# Patient Record
Sex: MALE | Race: WHITE | Employment: OTHER | URBAN - METROPOLITAN AREA
[De-identification: names, ages, dates, MRNs, and addresses within clinical notes are randomized per-mention and may not be internally consistent; named-entity substitution may affect disease eponyms.]

---

## 2017-02-13 PROBLEM — R06.83 SNORING: Status: ACTIVE | Noted: 2017-02-13

## 2017-02-13 PROBLEM — G47.10 HYPERSOMNOLENCE: Status: ACTIVE | Noted: 2017-02-13

## 2017-02-22 ENCOUNTER — HOSPITAL ENCOUNTER (OUTPATIENT)
Dept: LAB | Age: 72
Discharge: HOME | End: 2017-02-22

## 2017-02-22 PROCEDURE — 88312 SPECIAL STAINS GROUP 1: CPT | Performed by: INTERNAL MEDICINE

## 2017-02-22 PROCEDURE — 88305 TISSUE EXAM BY PATHOLOGIST: CPT | Performed by: INTERNAL MEDICINE

## 2017-02-27 ENCOUNTER — HOSPITAL ENCOUNTER (OUTPATIENT)
Dept: SLEEP MEDICINE | Age: 72
Discharge: HOME | End: 2017-02-27
Payer: MEDICARE

## 2017-02-27 PROCEDURE — 95810 POLYSOM 6/> YRS 4/> PARAM: CPT

## 2017-03-01 PROBLEM — R06.83 SNORING: Status: RESOLVED | Noted: 2017-02-13 | Resolved: 2017-03-01

## 2017-05-08 NOTE — PROGRESS NOTES
Myrna Mendoza. : 1945 [id number] [facility],[address]  [address] [place] [address]  [phone]   [phone]        OUTPATIENT PHYSICAL THERAPY:Initial Assessment 2017        ICD-10: Treatment Diagnosis: Shoulder lesion, unspecified, right shoulder (M75.91) Strain of muscle, fascia and tendon at neck level, sequela (S16.1XXS)  Precautions/Allergies: Other medication and Red wine extract   Fall Risk Score: 1 (? 5 = High Risk)  MD Orders: Eval and treat MEDICAL/REFERRING DIAGNOSIS:  Spinal stenosis, cervical region [M48.02]  Complete rotator cuff tear or rupture of right shoulder, not specified as traumatic [M75.121]   DATE OF ONSET: 17  REFERRING PHYSICIAN: Shanna Ribera MD  RETURN PHYSICIAN APPOINTMENT: TBD     INITIAL ASSESSMENT:  Mr. Juvenal Branch presents with strain of upper trapezius musculature, RTC syndrome of supraspinatus, and facet joint dysfunction in cervical spine. His impairments include decreased neck AROM, decreased shoulder ROM, myofascial restriction, and decreased joint mobility. He will benfit from PT services to improve impairments and reduce pain. PROBLEM LIST (Impacting functional limitations):  1. Decreased Strength  2. Decreased ADL/Functional Activities  3. Increased Pain  4. Decreased Flexibility/Joint Mobility INTERVENTIONS PLANNED:  1. Cold  2. Cryotherapy  3. Electrical Stimulation  4. Heat  5. Home Exercise Program (HEP)  6. Manual Therapy  7. Neuromuscular Re-education/Strengthening  8. Range of Motion (ROM)  9. Therapeutic Activites  10. Therapeutic Exercise/Strengthening   TREATMENT PLAN:  Effective Dates: 7 TO 17. Frequency/Duration: 2 times a week for 12 weeks  GOALS: (Goals have been discussed and agreed upon with patient.)  Short-Term Functional Goals: Time Frame: 2 weeks  1. Patient will be independent with HEP without pain.   2. Patient will report ability to sleep full night without waking due to pain. 3. Patient will have improve C/S AROM to 80 deg B rotation allowing him to look over shoulder. Discharge Goals: Time Frame: 12 weeks  1. 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 2. Patient will have mmt improve to 5-/5 B ER preventing re-inury. 3. Patient will have posture improved to upright WNL to reduce stress and pain on shoulder and neck. Rehabilitation Potential For Stated Goals: Good  Regarding Kingsley Kuhn Jr.'s therapy, I certify that the treatment plan above will be carried out by a therapist or under their direction. Thank you for this referral,  Malissa Bailey PT       Referring Physician Signature: Hafsa Lagos MD              Date                      HISTORY:   History of Present Injury/Illness (Reason for Referral):  75 y/o M with c/o of right shoulder pain 5/10 at worst.  Rest and heat make it feel better and he has gotten la little better with 2 weeks of rest and heat. .  Raising arm above head hurts worse. His pain began 4/21/17 because he repots having bad posture. His pain started after he was stretching his posture by laying flat on the ground and performing a superman exercise on stomach and raising arm above head. Location is from neck to top of shoulder. His headaches top of head for years off and on for years before neck/shoulder pain. Past Medical History/Comorbidities:   Mr. Yanet Herr  has a past medical history of ADD (attention deficit disorder); Anemia, unspecified; Aneurysm of unspecified site [facility]); Anxiety; Arthritis; Attention deficit disorder with hyperactivity(314.01); Benign paroxysmal vertigo; BPH (benign prostatic hypertrophy); Chronic prostatitis (11/15/2013); Colitis, enteritis, and gastroenteritis of presumed infectious origin; Depressive disorder, not elsewhere classified; Disorders of bursae and tendons in shoulder region, unspecified;  Fatty liver; GERD (gastroesophageal reflux disease); History of tetanus, diphtheria, and acellular pertussis booster vaccination (Tdap); Hypercholesteremia; Hypertrophy of prostate with urinary obstruction and other lower urinary tract symptoms (LUTS) (11/15/2013); Impotence of organic origin; Inguinal hernia (2014); Insomnia, unspecified; Medial epicondylitis of elbow; Nonspecific elevation of levels of transaminase or lactic acid dehydrogenase (LDH); Orthostatic hypotension; Osteoporosis (11/15/2013); Other and unspecified hyperlipidemia; Tremor; Type II or unspecified type diabetes mellitus without mention of complication, not stated as uncontrolled; and Vertigo. Mr. Ora Prabhakar  has a past surgical history that includes orthopaedic (2010); heent; mohs procedure (Right); vasectomy; urological; urological; turp (1156,7775); knee arthroscopy (Right, 2000); lumbar laminectomy (2/2012); shoulder arthroscopy (Right, 1995); tonsillectomy; cystoscopy,insert ureteral stent; and hernia repair (Bilateral, 2/3/14). Social History/Living Environment:    lives with wife, his having difficulty starting lawn mower  Prior Level of Function/Work/Activity:     Independent  Dominant Side:         LEFT  Current Medications:    Current Outpatient Prescriptions:     triamterene-hydroCHLOROthiazide (DYAZIDE) 37.5-25 mg per capsule, Take  by mouth daily. , Disp: , Rfl:     COCONUT OIL PO, Take  by mouth., Disp: , Rfl:     raNITIdine (ZANTAC) 150 mg tablet, Take 1 Tab by mouth two (2) times a day., Disp: 60 Tab, Rfl: 0    potassium chloride SR (KLOR-CON 10) 10 mEq tablet, Take  by mouth., Disp: , Rfl:     magnesium gluconate 500 mg (27 mg  elemental) tablet, Take  by mouth two (2) times a day., Disp: , Rfl:     b complex vitamins tablet, Take  by mouth., Disp: , Rfl:     arginine 500 mg tablet, Take 500 mg by mouth., Disp: , Rfl:     omega-3 fatty acids-vitamin e (FISH OIL) 1,000 mg cap, Take 1 Cap by mouth., Disp: , Rfl:     simvastatin (ZOCOR) 20 mg tablet, Take 10 mg by mouth nightly., Disp: , Rfl:     co-enzyme Q-10 (CO Q-10) 100 mg capsule, Take 100 mg by mouth daily. , Disp: , Rfl:     Cholecalciferol, Vitamin D3, (VITAMIN D) 1,000 unit Cap, Take  by mouth daily. , Disp: , Rfl:     ASPIRIN 81 mg Tab, Take 2 Tabs by mouth daily. , Disp: , Rfl:    Date Last Reviewed:  5/9/2017   # of Personal Factors/Comorbidities that affect the Plan of Care: 1-2: MODERATE COMPLEXITY   EXAMINATION:   Observation/Orthostatic Postural Assessment:          Rounded shoulder, forward head  Palpation:          Increased tone and pain UT, biceps, supraspinatus, infraspinatus  ROM:     C/S LR 55 deg RR 50 deg, R SB 5 deg with pain R UT, L SB 10 deg, Flex/ext WNL  Shoulder WNL      Strength:     ER R UE 3+/5 L UE 4+/5      Special Tests:          Empty can (+), H-K (+) pain   Neurological Screen:        Sensation: light touch intact B UE  Functional Mobility:         Apley's Stratch Test: ER and IR WNL  Balance:          n/a   Body Structures Involved:  1. Joints  2. Muscles Body Functions Affected:  1. Neuromusculoskeletal  2. Movement Related Activities and Participation Affected:  1. General Tasks and Demands  2. Self Care  3. Domestic Life  4. Interpersonal Interactions and Relationships  5. Community, Social and [place]   # of elements that affect the Plan of Care: 4+: HIGH COMPLEXITY   CLINICAL PRESENTATION:   Presentation: Evolving clinical presentation with changing clinical characteristics: MODERATE COMPLEXITY   CLINICAL DECISION MAKING:   Outcome Measure: Tool Used: Neck Disability Index (NDI)  Score:  Initial: 12/50  Most Recent: X/50 (Date: -- )   Interpretation of Score: The Neck Disability Index is a revised form of the Oswestry Low Back Pain Index and is designed to measure the activities of daily living in person's with neck pain. Each section is scored on a 0-5 scale, 5 representing the greatest disability.   The scores of each section are added together for a total score of 50. Score 0 1-10 11-20 21-30 31-40 41-49 50   Modifier CH CI CJ CK CL CM CN     ? Carrying, Moving, and Handling Objects:     - CURRENT STATUS: CJ - 20%-39% impaired, limited or restricted    - GOAL STATUS: CI - 1%-19% impaired, limited or restricted    - D/C STATUS:  ---------------To be determined---------------    Medical Necessity:   · Patient is expected to demonstrate progress in strength, range of motion and functional technique to increase independence with mower lawn and sleep. Reason for Services/Other Comments:  · Patient has been observed to have decreased C/S AROM before, during or after an intervention. Use of outcome tool(s) and clinical judgement create a POC that gives a: Questionable prediction of patient's progress: MODERATE COMPLEXITY   TREATMENT:   (In addition to Assessment/Re-Assessment sessions the following treatments were rendered)  THERAPEUTIC EXERCISE: (see grid for minutes):  Exercises per grid below to improve mobility, strength and dynamic movement of shoulder - right to improve functional carrying, reaching and overhead activites. Required moderate visual, verbal and manual cues to promote proper body alignment, promote proper body posture and promote proper body mechanics. Progressed resistance, range and repetitions as indicated. MANUAL THERAPY: (see grid for minutes): Joint mobilization and Soft tissue mobilization was utilized and necessary because of the patient's restricted joint motion, painful spasm and restricted motion of soft tissue. MODALITIES: (see grid for minutes): *  Electrical Stimulation Therapy (IFC) was provided with intensity adjusted throughout treatment to patient tolerance. to reduce pain       *  Cold Pack Therapy in order to provide analgesia and reduce inflammation and edema. *  Hot Pack Therapy in order to relieve muscle spasm.      Date: 5/9/17       Modalities:                                Therapeutic Exercise: 10'       SL ER and ABD 3x10       Prone superman with scap retraction and shoulder extension 5\"x5       prone HABD 10x       Seated flexion stretch on table 3x30\"                       Proprioceptive Activities:                                Manual Therapy:        STM R UT and suprspinatus 5'               Therapeutic Activities:                                        HEP: handout provided 5/9/17  Treatment/Session Assessment:  Demonstrated good teach back. .    · Pain/ Symptoms: Initial:   1/10  Post Session:  1/10 no increased pain ·   Compliance with Program/Exercises: Will assess as treatment progresses. · Recommendations/Intent for next treatment session: \"Next visit will focus on advancements to more challenging activities\".   Total Treatment Duration:        Desmond Hunter, PT

## 2017-05-09 ENCOUNTER — HOSPITAL ENCOUNTER (OUTPATIENT)
Dept: PHYSICAL THERAPY | Age: 72
Discharge: HOME | End: 2017-05-09
Payer: MEDICARE

## 2017-05-09 PROCEDURE — G8985 CARRY GOAL STATUS: HCPCS

## 2017-05-09 PROCEDURE — G8984 CARRY CURRENT STATUS: HCPCS

## 2017-05-09 PROCEDURE — 97162 PT EVAL MOD COMPLEX 30 MIN: CPT

## 2017-05-09 PROCEDURE — 97110 THERAPEUTIC EXERCISES: CPT

## 2017-05-09 NOTE — PROGRESS NOTES
Ambulatory/Rehab Services H2 Model Falls Risk Assessment    Risk Factor Pts. ·   Confusion/Disorientation/Impulsivity  []    4 ·   Symptomatic Depression  []   2 ·   Altered Elimination  []   1 ·   Dizziness/Vertigo  []   1 ·   Gender (Male)  [x]   1 ·   Any administered antiepileptics (anticonvulsants):  []   2 ·   Any administered benzodiazepines:  []   1 ·   Visual Impairment (specify):  []   1 ·   Portable Oxygen Use  []   1 ·   Orthostatic ? BP  []   1 ·   History of Recent Falls (within 3 mos.)  []   5     Ability to Rise from Chair (choose one) Pts. ·   Ability to rise in a single movement  []   0 ·   Pushes up, successful in one attempt  []   1 ·   Multiple attempts, but successful  []   3 ·   Unable to rise without assistance  []   4   Total: (5 or greater = High Risk) 1     Falls Prevention Plan:   []                Physical Limitations to Exercise (specify):   []                Mobility Assistance Device (type):   []                Exercise/Equipment Adaptation (specify):    ©2010 [facility] [address] Patent #[id number].  Federal Law prohibits the replication, distribution or use without written permission from [facility] [employer]

## 2017-05-16 ENCOUNTER — HOSPITAL ENCOUNTER (OUTPATIENT)
Dept: PHYSICAL THERAPY | Age: 72
Discharge: HOME | End: 2017-05-16
Payer: MEDICARE

## 2017-05-18 ENCOUNTER — HOSPITAL ENCOUNTER (OUTPATIENT)
Dept: PHYSICAL THERAPY | Age: 72
Discharge: HOME | End: 2017-05-18
Payer: MEDICARE

## 2017-05-18 PROCEDURE — 97140 MANUAL THERAPY 1/> REGIONS: CPT

## 2017-05-18 PROCEDURE — 97110 THERAPEUTIC EXERCISES: CPT

## 2017-05-18 NOTE — PROGRESS NOTES
Vlad Culp. : 1945 [id number] [facility],[address]  [address] [place] [address].  [phone]   [phone]        OUTPATIENT PHYSICAL THERAPY:Daily Note 2017        ICD-10: Treatment Diagnosis: Shoulder lesion, unspecified, right shoulder (M75.91) Strain of muscle, fascia and tendon at neck level, sequela (S16.1XXS)  Precautions/Allergies: Other medication and Red wine extract   Fall Risk Score: 1 (? 5 = High Risk)  MD Orders: Eval and treat MEDICAL/REFERRING DIAGNOSIS:  Spinal stenosis, cervical region [M48.02]  Complete rotator cuff tear or rupture of right shoulder, not specified as traumatic [M75.121]   DATE OF ONSET: 17  REFERRING PHYSICIAN: Azucena Hernandez MD  RETURN PHYSICIAN APPOINTMENT: TBD     INITIAL ASSESSMENT:  Mr. Danielle Rojas presents with strain of upper trapezius musculature, RTC syndrome of supraspinatus, and facet joint dysfunction in cervical spine. His impairments include decreased neck AROM, decreased shoulder ROM, myofascial restriction, and decreased joint mobility. He will benfit from PT services to improve impairments and reduce pain. PROBLEM LIST (Impacting functional limitations):  1. Decreased Strength  2. Decreased ADL/Functional Activities  3. Increased Pain  4. Decreased Flexibility/Joint Mobility INTERVENTIONS PLANNED:  1. Cold  2. Cryotherapy  3. Electrical Stimulation  4. Heat  5. Home Exercise Program (HEP)  6. Manual Therapy  7. Neuromuscular Re-education/Strengthening  8. Range of Motion (ROM)  9. Therapeutic Activites  10. Therapeutic Exercise/Strengthening   TREATMENT PLAN:  Effective Dates: 7 TO 17. Frequency/Duration: 2 times a week for 12 weeks  GOALS: (Goals have been discussed and agreed upon with patient.)  Short-Term Functional Goals: Time Frame: 2 weeks  1. Patient will be independent with HEP without pain.   2. Patient will report ability to sleep full night without waking due to pain.   3. Patient will have improve C/S AROM to 80 deg B rotation allowing him to look over shoulder. Discharge Goals: Time Frame: 12 weeks  1. 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 2. Patient will have mmt improve to 5-/5 B ER preventing re-inury. 3. Patient will have posture improved to upright WNL to reduce stress and pain on shoulder and neck. Rehabilitation Potential For Stated Goals: Good                HISTORY:   History of Present Injury/Illness (Reason for Referral):  73 y/o M with c/o of right shoulder pain 5/10 at worst.  Rest and heat make it feel better and he has gotten la little better with 2 weeks of rest and heat. .  Raising arm above head hurts worse. His pain began 4/21/17 because he repots having bad posture. His pain started after he was stretching his posture by laying flat on the ground and performing a superman exercise on stomach and raising arm above head. Location is from neck to top of shoulder. His headaches top of head for years off and on for years before neck/shoulder pain. Past Medical History/Comorbidities:   Mr. Doreen Denver  has a past medical history of ADD (attention deficit disorder); Anemia, unspecified; Aneurysm of unspecified site [facility]); Anxiety; Arthritis; Attention deficit disorder with hyperactivity; Benign paroxysmal vertigo; BPH (benign prostatic hypertrophy); Chronic prostatitis (11/15/2013); Colitis, enteritis, and gastroenteritis of presumed infectious origin; Depressive disorder, not elsewhere classified; Disorders of bursae and tendons in shoulder region, unspecified; Fatty liver; GERD (gastroesophageal reflux disease); History of tetanus, diphtheria, and acellular pertussis booster vaccination (Tdap); Hypercholesteremia; Hypertrophy of prostate with urinary obstruction and other lower urinary tract symptoms (LUTS) (11/15/2013); Impotence of organic origin;  Inguinal hernia (2014); Insomnia, unspecified; Medial epicondylitis of elbow; Nonspecific elevation of levels of transaminase or lactic acid dehydrogenase (LDH); Orthostatic hypotension; Osteoporosis (11/15/2013); Other and unspecified hyperlipidemia; Tremor; Type II or unspecified type diabetes mellitus without mention of complication, not stated as uncontrolled; and Vertigo. Mr. Ora Prabhakar  has a past surgical history that includes orthopaedic (2010); heent; mohs procedure (Right); vasectomy; urological; urological; turp ([id number]); knee arthroscopy (Right, 2000); lumbar laminectomy (2/2012); shoulder arthroscopy (Right, 1995); tonsillectomy; cystoscopy,insert ureteral stent; and hernia repair (Bilateral, 2/3/14). Social History/Living Environment:    lives with wife, his having difficulty starting   Prior Level of Function/Work/Activity:     Independent  Dominant Side:         LEFT  Current Medications:    Current Outpatient Prescriptions:     clonazePAM (KLONOPIN) 1 mg tablet, , Disp: , Rfl: 2    omeprazole (PRILOSEC) 40 mg capsule, , Disp: , Rfl: 4    sertraline (ZOLOFT) 50 mg tablet, , Disp: , Rfl: 2    CHOLINE PO, Take  by mouth., Disp: , Rfl:     cpap machine kit, by Does Not Apply route. autopap 5-15 cm, Disp: , Rfl:     triamterene-hydroCHLOROthiazide (DYAZIDE) 37.5-25 mg per capsule, Take  by mouth daily. , Disp: , Rfl:     COCONUT OIL PO, Take  by mouth., Disp: , Rfl:     potassium chloride SR (KLOR-CON 10) 10 mEq tablet, Take  by mouth., Disp: , Rfl:     magnesium gluconate 500 mg (27 mg  elemental) tablet, Take  by mouth two (2) times a day., Disp: , Rfl:     b complex vitamins tablet, Take  by mouth., Disp: , Rfl:     arginine 500 mg tablet, Take 500 mg by mouth., Disp: , Rfl:     omega-3 fatty acids-vitamin e (FISH OIL) 1,000 mg cap, Take 1 Cap by mouth., Disp: , Rfl:     simvastatin (ZOCOR) 20 mg tablet, Take 10 mg by mouth nightly., Disp: , Rfl:     co-enzyme Q-10 (CO Q-10) 100 mg capsule, Take 100 mg by mouth daily. , Disp: , Rfl:     Cholecalciferol, Vitamin D3, (VITAMIN D) 1,000 unit Cap, Take  by mouth daily. , Disp: , Rfl:     ASPIRIN 81 mg Tab, Take 2 Tabs by mouth daily. , Disp: , Rfl:    Date Last Reviewed:  5/18/2017   EXAMINATION:   Observation/Orthostatic Postural Assessment:          Rounded shoulder, forward head  Palpation:          Increased tone and pain UT, biceps, supraspinatus, infraspinatus  ROM:     C/S LR 55 deg RR 50 deg, R SB 5 deg with pain R UT, L SB 10 deg, Flex/ext WNL  Shoulder WNL      Strength:     ER R UE 3+/5 L UE 4+/5      Special Tests:          Empty can (+), H-K (+) pain   Neurological Screen:        Sensation: light touch intact B UE  Functional Mobility:         Apley's Stratch Test: ER and IR WNL  Balance:          n/a   Body Structures Involved:  1. Joints  2. Muscles Body Functions Affected:  1. Neuromusculoskeletal  2. Movement Related Activities and Participation Affected:  1. General Tasks and Demands  2. Self Care  3. Domestic Life  4. Interpersonal Interactions and Relationships  5. Community, Social and Civic Life   CLINICAL PRESENTATION:   CLINICAL DECISION MAKING:   Outcome Measure: Tool Used: Neck Disability Index (NDI)  Score:  Initial: 12/50  Most Recent: X/50 (Date: -- )   Interpretation of Score: The Neck Disability Index is a revised form of the Oswestry Low Back Pain Index and is designed to measure the activities of daily living in person's with neck pain. Each section is scored on a 0-5 scale, 5 representing the greatest disability. The scores of each section are added together for a total score of 50. Score 0 1-10 11-20 21-30 31-40 41-49 50   Modifier CH CI CJ CK CL CM CN     ?  Carrying, Moving, and Handling Objects:     - CURRENT STATUS: CJ - 20%-39% impaired, limited or restricted    - GOAL STATUS: CI - 1%-19% impaired, limited or restricted    - D/C STATUS:  ---------------To be determined---------------    Medical Necessity: · Patient is expected to demonstrate progress in strength, range of motion and functional technique to increase independence with mower lawn and sleep. Reason for Services/Other Comments:  · Patient has been observed to have decreased C/S AROM before, during or after an intervention. TREATMENT:   (In addition to Assessment/Re-Assessment sessions the following treatments were rendered)  THERAPEUTIC EXERCISE: (see grid for minutes):  Exercises per grid below to improve mobility, strength and dynamic movement of shoulder - right to improve functional carrying, reaching and overhead activites. Required moderate visual, verbal and manual cues to promote proper body alignment, promote proper body posture and promote proper body mechanics. Progressed resistance, range and repetitions as indicated. MANUAL THERAPY: (see grid for minutes): Joint mobilization and Soft tissue mobilization was utilized and necessary because of the patient's restricted joint motion, painful spasm and restricted motion of soft tissue. MODALITIES: (see grid for minutes): *  Electrical Stimulation Therapy (IFC) was provided with intensity adjusted throughout treatment to patient tolerance. to reduce pain       *  Cold Pack Therapy in order to provide analgesia and reduce inflammation and edema. *  Hot Pack Therapy in order to relieve muscle spasm.      Date: 5/9/17 5/18/17  (visit 2)      Modalities:                                Therapeutic Exercise: 10' 30 min      SL ER and ABD 3x10 2# 3x10 B      Prone superman with scap retraction and shoulder extension 5\"x5 10x5\" SB holds      prone HABD 10x SB 2x10 2#      Seated flexion stretch on table 3x30\" matias post 3x30\"      B UT stretch   3x30\"      R subscap stretch  Next visit      Proprioceptive Activities:                                Manual Therapy:  15 min      STM R UT and suprspinatus 5' B UT and SOR      R ACJ ant mobs  5 min osc      Therapeutic Activities: HEP: handout provided 5/9/17  Treatment/Session Assessment:  He reports significant pain relief since his first session. He has soreness in his right upper neck today because he was sitting in front of a computer all day. He demonstrates good teach back with exercises progression. He will benefit from 1-2 more PT sessions until he will be d/c'ed to [facility]. · Pain/ Symptoms: Initial:   1/10 upper right neck Post Session: 0/10 ·   Compliance with Program/Exercises: Will assess as treatment progresses. · Recommendations/Intent for next treatment session: \"Next visit will focus on advancements to more challenging activities\".   Total Treatment Duration:  PT Patient Time In/Time Out  Time In: 1000  Time Out: 2000 [facility]

## 2017-05-22 ENCOUNTER — HOSPITAL ENCOUNTER (OUTPATIENT)
Dept: PHYSICAL THERAPY | Age: 72
Discharge: HOME | End: 2017-05-22
Payer: MEDICARE

## 2017-05-22 PROCEDURE — 97110 THERAPEUTIC EXERCISES: CPT

## 2017-05-22 PROCEDURE — 97140 MANUAL THERAPY 1/> REGIONS: CPT

## 2017-05-22 NOTE — PROGRESS NOTES
Radha Sun. : 1945 [address], [address] [place] [address]  [phone]   [phone]        OUTPATIENT PHYSICAL THERAPY:Daily Note 2017        ICD-10: Treatment Diagnosis: Shoulder lesion, unspecified, right shoulder (M75.91) Strain of muscle, fascia and tendon at neck level, sequela (S16.1XXS)  Precautions/Allergies: Other medication and Red wine extract   Fall Risk Score: 1 (? 5 = High Risk)  MD Orders: Eval and treat MEDICAL/REFERRING DIAGNOSIS:  Spinal stenosis, cervical region [M48.02]  Complete rotator cuff tear or rupture of right shoulder, not specified as traumatic [M75.121]   DATE OF ONSET: 17  REFERRING PHYSICIAN: Lynda Palacios MD  RETURN PHYSICIAN APPOINTMENT: TBD     INITIAL ASSESSMENT:  Mr. Lindsey Cruz presents with strain of upper trapezius musculature, RTC syndrome of supraspinatus, and facet joint dysfunction in cervical spine. His impairments include decreased neck AROM, decreased shoulder ROM, myofascial restriction, and decreased joint mobility. He will benfit from PT services to improve impairments and reduce pain. PROBLEM LIST (Impacting functional limitations):  1. Decreased Strength  2. Decreased ADL/Functional Activities  3. Increased Pain  4. Decreased Flexibility/Joint Mobility INTERVENTIONS PLANNED:  1. Cold  2. Cryotherapy  3. Electrical Stimulation  4. Heat  5. Home Exercise Program (HEP)  6. Manual Therapy  7. Neuromuscular Re-education/Strengthening  8. Range of Motion (ROM)  9. Therapeutic Activites  10. Therapeutic Exercise/Strengthening   TREATMENT PLAN:  Effective Dates: 7 TO 17. Frequency/Duration: 2 times a week for 12 weeks  GOALS: (Goals have been discussed and agreed upon with patient.)  Short-Term Functional Goals: Time Frame: 2 weeks  1. Patient will be independent with HEP without pain.   2. Patient will report ability to sleep full night without waking due to pain.   3. Patient will have improve C/S AROM to 80 deg B rotation allowing him to look over shoulder. Discharge Goals: Time Frame: 12 weeks  1. 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 2. Patient will have mmt improve to 5-/5 B ER preventing re-inury. 3. Patient will have posture improved to upright WNL to reduce stress and pain on shoulder and neck. Rehabilitation Potential For Stated Goals: Good                HISTORY:   History of Present Injury/Illness (Reason for Referral):  75 y/o M with c/o of right shoulder pain 5/10 at worst.  Rest and heat make it feel better and he has gotten la little better with 2 weeks of rest and heat. .  Raising arm above head hurts worse. His pain began 4/21/17 because he repots having bad posture. His pain started after he was stretching his posture by laying flat on the ground and performing a superman exercise on stomach and raising arm above head. Location is from neck to top of shoulder. His headaches top of head for years off and on for years before neck/shoulder pain. Past Medical History/Comorbidities:   Mr. Celio Kuhn  has a past medical history of ADD (attention deficit disorder); Anemia, unspecified; Aneurysm of unspecified site [facility]); Anxiety; Arthritis; Attention deficit disorder with hyperactivity; Benign paroxysmal vertigo; BPH (benign prostatic hypertrophy); Chronic prostatitis (11/15/2013); Colitis, enteritis, and gastroenteritis of presumed infectious origin; Depressive disorder, not elsewhere classified; Disorders of bursae and tendons in shoulder region, unspecified; Fatty liver; GERD (gastroesophageal reflux disease); History of tetanus, diphtheria, and acellular pertussis booster vaccination (Tdap); Hypercholesteremia; Hypertrophy of prostate with urinary obstruction and other lower urinary tract symptoms (LUTS) (11/15/2013); Impotence of organic origin;  Inguinal hernia (2014); Insomnia, unspecified; Medial epicondylitis of elbow; Nonspecific elevation of levels of transaminase or lactic acid dehydrogenase (LDH); Orthostatic hypotension; Osteoporosis (11/15/2013); Other and unspecified hyperlipidemia; Tremor; Type II or unspecified type diabetes mellitus without mention of complication, not stated as uncontrolled; and Vertigo. Mr. Renuka Iyer  has a past surgical history that includes orthopaedic (2010); heent; mohs procedure (Right); vasectomy; urological; urological; turp (0720,5831); knee arthroscopy (Right, 2000); lumbar laminectomy (2/2012); shoulder arthroscopy (Right, 1995); tonsillectomy; cystoscopy,insert ureteral stent; and hernia repair (Bilateral, 2/3/14). Social History/Living Environment:    lives with wife, his having difficulty starting   Prior Level of Function/Work/Activity:     Independent  Dominant Side:         LEFT  Current Medications:    Current Outpatient Prescriptions:     clonazePAM (KLONOPIN) 1 mg tablet, , Disp: , Rfl: 2    omeprazole (PRILOSEC) 40 mg capsule, , Disp: , Rfl: 4    sertraline (ZOLOFT) 50 mg tablet, , Disp: , Rfl: 2    CHOLINE PO, Take  by mouth., Disp: , Rfl:     cpap machine kit, by Does Not Apply route. autopap 5-15 cm, Disp: , Rfl:     triamterene-hydroCHLOROthiazide (DYAZIDE) 37.5-25 mg per capsule, Take  by mouth daily. , Disp: , Rfl:     COCONUT OIL PO, Take  by mouth., Disp: , Rfl:     potassium chloride SR (KLOR-CON 10) 10 mEq tablet, Take  by mouth., Disp: , Rfl:     magnesium gluconate 500 mg (27 mg  elemental) tablet, Take  by mouth two (2) times a day., Disp: , Rfl:     b complex vitamins tablet, Take  by mouth., Disp: , Rfl:     arginine 500 mg tablet, Take 500 mg by mouth., Disp: , Rfl:     omega-3 fatty acids-vitamin e (FISH OIL) 1,000 mg cap, Take 1 Cap by mouth., Disp: , Rfl:     simvastatin (ZOCOR) 20 mg tablet, Take 10 mg by mouth nightly., Disp: , Rfl:     co-enzyme Q-10 (CO Q-10) 100 mg capsule, Take 100 mg by mouth daily. , Disp: , Rfl:     Cholecalciferol, Vitamin D3, (VITAMIN D) 1,000 unit Cap, Take  by mouth daily. , Disp: , Rfl:     ASPIRIN 81 mg Tab, Take 2 Tabs by mouth daily. , Disp: , Rfl:    Date Last Reviewed:  5/22/2017   EXAMINATION:   Observation/Orthostatic Postural Assessment:          Rounded shoulder, forward head  Palpation:          Increased tone and pain UT, biceps, supraspinatus, infraspinatus  ROM:     C/S LR 55 deg RR 50 deg, R SB 5 deg with pain R UT, L SB 10 deg, Flex/ext WNL  Shoulder WNL      Strength:     ER R UE 3+/5 L UE 4+/5      Special Tests:          Empty can (+), H-K (+) pain   Neurological Screen:        Sensation: light touch intact B UE  Functional Mobility:         Apley's Stratch Test: ER and IR WNL  Balance:          n/a   Body Structures Involved:  1. Joints  2. Muscles Body Functions Affected:  1. Neuromusculoskeletal  2. Movement Related Activities and Participation Affected:  1. General Tasks and Demands  2. Self Care  3. Domestic Life  4. Interpersonal Interactions and Relationships  5. Community, Social and Civic Life   CLINICAL PRESENTATION:   CLINICAL DECISION MAKING:   Outcome Measure: Tool Used: Neck Disability Index (NDI)  Score:  Initial: 12/50  Most Recent: X/50 (Date: -- )   Interpretation of Score: The Neck Disability Index is a revised form of the Oswestry Low Back Pain Index and is designed to measure the activities of daily living in person's with neck pain. Each section is scored on a 0-5 scale, 5 representing the greatest disability. The scores of each section are added together for a total score of 50. Score 0 1-10 11-20 21-30 31-40 41-49 50   Modifier CH CI CJ CK CL CM CN     ?  Carrying, Moving, and Handling Objects:     - CURRENT STATUS: CJ - 20%-39% impaired, limited or restricted    - GOAL STATUS: CI - 1%-19% impaired, limited or restricted    - D/C STATUS:  ---------------To be determined---------------    Medical Necessity: · Patient is expected to demonstrate progress in strength, range of motion and functional technique to increase independence with mower lawn and sleep. Reason for Services/Other Comments:  · Patient has been observed to have decreased C/S AROM before, during or after an intervention. TREATMENT:   (In addition to Assessment/Re-Assessment sessions the following treatments were rendered)  THERAPEUTIC EXERCISE: (see grid for minutes):  Exercises per grid below to improve mobility, strength and dynamic movement of shoulder - right to improve functional carrying, reaching and overhead activites. Required moderate visual, verbal and manual cues to promote proper body alignment, promote proper body posture and promote proper body mechanics. Progressed resistance, range and repetitions as indicated. MANUAL THERAPY: (see grid for minutes): Joint mobilization and Soft tissue mobilization was utilized and necessary because of the patient's restricted joint motion, painful spasm and restricted motion of soft tissue. MODALITIES: (see grid for minutes): *  Electrical Stimulation Therapy (IFC) was provided with intensity adjusted throughout treatment to patient tolerance. to reduce pain       *  Cold Pack Therapy in order to provide analgesia and reduce inflammation and edema. *  Hot Pack Therapy in order to relieve muscle spasm.      Date: 5/9/17 5/18/17  (visit 2) 5/22/17 (visit 3)     Modalities:                                Therapeutic Exercise: 10' 30 min 30 min     SL ER and ABD 3x10 2# 3x10 B      Prone on Swiss ball- shoulder extension   3x10 1#     Prone on SB - shoulder horizontal abduction   3x10 1#     Prone superman with scap retraction and shoulder extension 5\"x5 10x5\" SB holds 3x10 prone scaption on SB     prone HABD 10x SB 2x10 2#      Seated flexion stretch on table 3x30\" matias post 3x30\"      Bilateral levator stretch   15 sec hold      B UT stretch   3x30\" 15 sec hold x4 Bilateral     UBE 6 min forward/reverse L3     Standing rows and shoulder extension   3x10 each blue band     R subscap stretch  Next visit      Proprioceptive Activities:                                Manual Therapy:  15 min 15 min     STM R UT and suprspinatus 5' B UT and SOR 5 min to upper traps     Cervical PROM/stretching   10 min      R ACJ ant mobs  5 min osc      Therapeutic Activities:                                        HEP: handout provided 5/9/17  Treatment/Session Assessment: Pt did not report increased pain with exercise. He will benefit from 1-2 more PT sessions until he will be d/c'ed to IND HEP. · Pain/ Symptoms: Initial:   0/10 upper right neck    Pt states \"I feel pretty good. \" Post Session: 0/10 ·   Compliance with Program/Exercises: Will assess as treatment progresses. · Recommendations/Intent for next treatment session: \"Next visit will focus on advancements to more challenging activities\".   Total Treatment Duration:  PT Patient Time In/Time Out  Time In: 1145  Time Out: [address], PTA

## 2017-05-23 NOTE — PROGRESS NOTES
Alexandr Castillo. : 1945 [address] [facility] [address]  [address] [place] [address].  [phone]   [phone]        OUTPATIENT PHYSICAL THERAPY:Daily Note and Discharge 2017        ICD-10: Treatment Diagnosis: Shoulder lesion, unspecified, right shoulder (M75.91) Strain of muscle, fascia and tendon at neck level, sequela (S16.1XXS)  Precautions/Allergies: Other medication and Red wine extract   Fall Risk Score: 1 (? 5 = High Risk)  MD Orders: Eval and treat MEDICAL/REFERRING DIAGNOSIS:  Spinal stenosis, cervical region [M48.02]  Complete rotator cuff tear or rupture of right shoulder, not specified as traumatic [M75.121]   DATE OF ONSET: 17  REFERRING PHYSICIAN: Suzette Oconnell MD  RETURN PHYSICIAN APPOINTMENT: TBD     INITIAL ASSESSMENT 17:  Mr. Peri Castro presents with strain of upper trapezius musculature, RTC syndrome of supraspinatus, and facet joint dysfunction in cervical spine. His impairments include decreased neck AROM, decreased shoulder ROM, myofascial restriction, and decreased joint mobility. He will benfit from PT services to improve impairments and reduce pain. Discharge 17: He has met all goals, has no pain, and is independent with HEP. Recommend discharge due to meeting all goals. He attended 4 session. His treatment plan included there ex, postural correction, manual therapy, and HEP. PROBLEM LIST (Impacting functional limitations):  1. Decreased Strength  2. Decreased ADL/Functional Activities  3. Increased Pain  4. Decreased Flexibility/Joint Mobility INTERVENTIONS PLANNED:  1. Cold  2. Cryotherapy  3. Electrical Stimulation  4. Heat  5. Home Exercise Program (HEP)  6. Manual Therapy  7. Neuromuscular Re-education/Strengthening  8. Range of Motion (ROM)  9. Therapeutic Activites  10. Therapeutic Exercise/Strengthening   TREATMENT PLAN:  Effective Dates: 7 TO 17.   Frequency/Duration: 2 times a week for 12 weeks  GOALS: (Goals have been discussed and agreed upon with patient.)  Short-Term Functional Goals: Time Frame: 2 weeks  1. Patient will be independent with HEP without pain.(MET)  2. Patient will report ability to sleep full night without waking due to pain. (MET)  3. Patient will have improve C/S AROM to 80 deg B rotation allowing him to look over shoulder.(MET)  Discharge Goals: Time Frame: 12 weeks  1. 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 2. Patient will have mmt improve to 5-/5 B ER preventing re-inury.(MET)  3. Patient will have posture improved to upright WNL to reduce stress and pain on shoulder and neck. (MET)    Rehabilitation Potential For Stated Goals: Good                HISTORY:   History of Present Injury/Illness (Reason for Referral):  75 y/o M with c/o of right shoulder pain 5/10 at worst.  Rest and heat make it feel better and he has gotten la little better with 2 weeks of rest and heat. .  Raising arm above head hurts worse. His pain began 4/21/17 because he repots having bad posture. His pain started after he was stretching his posture by laying flat on the ground and performing a superman exercise on stomach and raising arm above head. Location is from neck to top of shoulder. His headaches top of head for years off and on for years before neck/shoulder pain. Past Medical History/Comorbidities:   Mr. Russ Lopez  has a past medical history of ADD (attention deficit disorder); Anemia, unspecified; Aneurysm of unspecified site [facility]); Anxiety; Arthritis; Attention deficit disorder with hyperactivity; Benign paroxysmal vertigo; BPH (benign prostatic hypertrophy); Chronic prostatitis (11/15/2013); Colitis, enteritis, and gastroenteritis of presumed infectious origin; Depressive disorder, not elsewhere classified; Disorders of bursae and tendons in shoulder region, unspecified;  Fatty liver; GERD (gastroesophageal reflux disease); History of tetanus, diphtheria, and acellular pertussis booster vaccination (Tdap); Hypercholesteremia; Hypertrophy of prostate with urinary obstruction and other lower urinary tract symptoms (LUTS) (11/15/2013); Impotence of organic origin; Inguinal hernia (2014); Insomnia, unspecified; Medial epicondylitis of elbow; Nonspecific elevation of levels of transaminase or lactic acid dehydrogenase (LDH); Orthostatic hypotension; Osteoporosis (11/15/2013); Other and unspecified hyperlipidemia; Tremor; Type II or unspecified type diabetes mellitus without mention of complication, not stated as uncontrolled; and Vertigo. Mr. Raul Spangler  has a past surgical history that includes orthopaedic (2010); heent; mohs procedure (Right); vasectomy; urological; urological; turp ([id number]); knee arthroscopy (Right, 2000); lumbar laminectomy (2/2012); shoulder arthroscopy (Right, 1995); tonsillectomy; cystoscopy,insert ureteral stent; and hernia repair (Bilateral, 2/3/14). Social History/Living Environment:    lives with wife, his having difficulty starting lawn mower  Prior Level of Function/Work/Activity:     Independent  Dominant Side:         LEFT  Current Medications:    Current Outpatient Prescriptions:     clonazePAM (KLONOPIN) 1 mg tablet, , Disp: , Rfl: 2    omeprazole (PRILOSEC) 40 mg capsule, , Disp: , Rfl: 4    sertraline (ZOLOFT) 50 mg tablet, , Disp: , Rfl: 2    CHOLINE PO, Take  by mouth., Disp: , Rfl:     cpap machine kit, by Does Not Apply route. autopap 5-15 cm, Disp: , Rfl:     triamterene-hydroCHLOROthiazide (DYAZIDE) 37.5-25 mg per capsule, Take  by mouth daily. , Disp: , Rfl:     COCONUT OIL PO, Take  by mouth., Disp: , Rfl:     potassium chloride SR (KLOR-CON 10) 10 mEq tablet, Take  by mouth., Disp: , Rfl:     magnesium gluconate 500 mg (27 mg  elemental) tablet, Take  by mouth two (2) times a day., Disp: , Rfl:     b complex vitamins tablet, Take  by mouth., Disp: , Rfl:     arginine 500 mg tablet, Take 500 mg by mouth., Disp: , Rfl:     omega-3 fatty acids-vitamin e (FISH OIL) 1,000 mg cap, Take 1 Cap by mouth., Disp: , Rfl:     simvastatin (ZOCOR) 20 mg tablet, Take 10 mg by mouth nightly., Disp: , Rfl:     co-enzyme Q-10 (CO Q-10) 100 mg capsule, Take 100 mg by mouth daily. , Disp: , Rfl:     Cholecalciferol, Vitamin D3, (VITAMIN D) 1,000 unit Cap, Take  by mouth daily. , Disp: , Rfl:     ASPIRIN 81 mg Tab, Take 2 Tabs by mouth daily. , Disp: , Rfl:    Date Last Reviewed:  5/24/2017   EXAMINATION:   Observation/Orthostatic Postural Assessment:         Rounded shoulder, forward head  Palpation:          WNL  ROM:     C/S LR 55 deg RR 50 deg, R SB 5 deg with pain R UT, L SB 10 deg, Flex/ext WNL  Shoulder WNL   5/24/17  C/S AROM WNL without pain   Strength:     ER R UE 3+/5 L UE 4+/5   5/23/17  ER 5-/5 B without pain   Special Tests:          Empty can -+), H-K (-) pain   Neurological Screen:        Sensation: light touch intact B UE  Functional Mobility:         Apley's Stratch Test: ER and IR WNL  Balance:          n/a   Body Structures Involved:  1. Joints  2. Muscles Body Functions Affected:  1. Neuromusculoskeletal  2. Movement Related Activities and Participation Affected:  1. General Tasks and Demands  2. Self Care  3. Domestic Life  4. Interpersonal Interactions and Relationships  5. Community, Social and Civic Life   CLINICAL PRESENTATION:   CLINICAL DECISION MAKING:   Outcome Measure: Tool Used: Neck Disability Index (NDI)  Score:  Initial: 12/50  Most Recent: 0/50 (Date: 5/24/17 )   Interpretation of Score: The Neck Disability Index is a revised form of the Oswestry Low Back Pain Index and is designed to measure the activities of daily living in person's with neck pain. Each section is scored on a 0-5 scale, 5 representing the greatest disability. The scores of each section are added together for a total score of 50.    Score 0 1-10 11-20 21-30 31-40 41-49 50 Modifier CH CI CJ CK CL CM CN     ? Carrying, Moving, and Handling Objects:     - CURRENT STATUS: CH - 0% impaired, limited or restricted    - GOAL STATUS: CI - 1%-19% impaired, limited or restricted    - D/C STATUS:  [address] [place] [address] - 0% impaired, limited or restricted    Medical Necessity:   · Patient is expected to demonstrate progress in strength, range of motion and functional technique to increase independence with mower lawn and sleep. Reason for Services/Other Comments:  · Patient has been observed to have decreased C/S AROM before, during or after an intervention. TREATMENT:   (In addition to Assessment/Re-Assessment sessions the following treatments were rendered)  THERAPEUTIC EXERCISE: (see grid for minutes):  Exercises per grid below to improve mobility, strength and dynamic movement of shoulder - right to improve functional carrying, reaching and overhead activites. Required moderate visual, verbal and manual cues to promote proper body alignment, promote proper body posture and promote proper body mechanics. Progressed resistance, range and repetitions as indicated. MANUAL THERAPY: (see grid for minutes): Joint mobilization and Soft tissue mobilization was utilized and necessary because of the patient's restricted joint motion, painful spasm and restricted motion of soft tissue. MODALITIES: (see grid for minutes): *  Electrical Stimulation Therapy (IFC) was provided with intensity adjusted throughout treatment to patient tolerance. to reduce pain       *  Cold Pack Therapy in order to provide analgesia and reduce inflammation and edema. *  Hot Pack Therapy in order to relieve muscle spasm.      Date: 5/9/17 5/18/17  (visit 2) 5/22/17 (visit 3) 5/24/17  (visit 4)    Modalities:                                Therapeutic Exercise: 10' 30 min 30 min 30 min    SL ER and ABD 3x10 2# 3x10 B      Prone on Swiss ball- shoulder extension   3x10 1# 3x10 1#    Prone on SB - shoulder horizontal abduction   3x10 1# 3x10 1#    Prone superman with scap retraction and shoulder extension 5\"x5 10x5\" SB holds 3x10 prone scaption on SB \"Y\" 0# 3x10    prone HABD 10x SB 2x10 2#  Karo Freeze post stretch    Seated flexion stretch on table 3x30\" matias post 3x30\"      Bilateral levator stretch   15 sec hold      B UT stretch   3x30\" 15 sec hold x4 Bilateral 3x30\" B    UBE   6 min forward/reverse L3 3' each dir L4    Standing rows and shoulder extension   3x10 each blue band 3x10 RTB    R subscap stretch  Next visit      Proprioceptive Activities:                                Manual Therapy:  15 min 15 min 10 mins    STM R UT and suprspinatus 5' B UT and SOR 5 min to upper traps B UT STM, PROM C/S rot    Cervical PROM/stretching   10 min      R ACJ ant mobs  5 min osc      Therapeutic Activities:                                        HEP: handout provided 5/9/17  Treatment/Session Assessment:Met all goals, has no pain, recommend d/c to IND HEP.      · Pain/ Symptoms: Initial:   0/10 \"I have no pain and I can do everything without pain\" Post Session: 0/10 ·   Compliance with Program/Exercises: Compliant  · Recommendations/Intent for next treatment session: Discharge  Total Treatment Duration:  PT Patient Time In/Time Out  Time In: 1145  Time Out: [address] [address], PT

## 2017-05-24 ENCOUNTER — HOSPITAL ENCOUNTER (OUTPATIENT)
Dept: PHYSICAL THERAPY | Age: 72
Discharge: HOME | End: 2017-05-24
Payer: MEDICARE

## 2017-05-24 PROCEDURE — 97140 MANUAL THERAPY 1/> REGIONS: CPT

## 2017-05-24 PROCEDURE — G8985 CARRY GOAL STATUS: HCPCS

## 2017-05-24 PROCEDURE — 97110 THERAPEUTIC EXERCISES: CPT

## 2017-05-24 PROCEDURE — G8986 CARRY D/C STATUS: HCPCS

## 2017-05-24 PROCEDURE — G8984 CARRY CURRENT STATUS: HCPCS

## 2017-05-30 ENCOUNTER — APPOINTMENT (OUTPATIENT)
Dept: PHYSICAL THERAPY | Age: 72
End: 2017-05-30
Payer: MEDICARE

## 2017-06-01 ENCOUNTER — APPOINTMENT (OUTPATIENT)
Dept: PHYSICAL THERAPY | Age: 72
End: 2017-06-01

## 2017-06-05 ENCOUNTER — APPOINTMENT (OUTPATIENT)
Dept: PHYSICAL THERAPY | Age: 72
End: 2017-06-05

## 2017-06-09 ENCOUNTER — APPOINTMENT (OUTPATIENT)
Dept: PHYSICAL THERAPY | Age: 72
End: 2017-06-09

## 2017-06-12 ENCOUNTER — APPOINTMENT (OUTPATIENT)
Dept: PHYSICAL THERAPY | Age: 72
End: 2017-06-12

## 2017-06-14 ENCOUNTER — APPOINTMENT (OUTPATIENT)
Dept: PHYSICAL THERAPY | Age: 72
End: 2017-06-14

## 2017-06-19 ENCOUNTER — APPOINTMENT (OUTPATIENT)
Dept: PHYSICAL THERAPY | Age: 72
End: 2017-06-19

## 2017-06-21 ENCOUNTER — APPOINTMENT (OUTPATIENT)
Dept: PHYSICAL THERAPY | Age: 72
End: 2017-06-21

## 2017-06-28 ENCOUNTER — APPOINTMENT (OUTPATIENT)
Dept: PHYSICAL THERAPY | Age: 72
End: 2017-06-28

## 2017-06-30 ENCOUNTER — APPOINTMENT (OUTPATIENT)
Dept: PHYSICAL THERAPY | Age: 72
End: 2017-06-30

## 2017-07-26 NOTE — PROGRESS NOTES
Ambulatory/Rehab Services H2 Model Falls Risk Assessment    Risk Factor Pts. ·   Confusion/Disorientation/Impulsivity  []    4 ·   Symptomatic Depression  []   2 ·   Altered Elimination  []   1 ·   Dizziness/Vertigo  []   1 ·   Gender (Male)  [x]   1 ·   Any administered antiepileptics (anticonvulsants):  []   2 ·   Any administered benzodiazepines:  []   1 ·   Visual Impairment (specify):  []   1 ·   Portable Oxygen Use  []   1 ·   Orthostatic ? BP  []   1 ·   History of Recent Falls (within 3 mos.)  []   5     Ability to Rise from Chair (choose one) Pts. ·   Ability to rise in a single movement  []   0 ·   Pushes up, successful in one attempt  []   1 ·   Multiple attempts, but successful  []   3 ·   Unable to rise without assistance  []   4   Total: (5 or greater = High Risk) 1     Falls Prevention Plan:   []                Physical Limitations to Exercise (specify):   []                Mobility Assistance Device (type):   []                Exercise/Equipment Adaptation (specify):    ©2010 [facility] [address] [place] [address] Patent #[id number].  Federal Law prohibits the replication, distribution or use without written permission from [facility]

## 2017-07-26 NOTE — PROGRESS NOTES
Idania Robles. : 1945 [address] [facility] 175  [address] [place] [address].  [phone]   [phone]        OUTPATIENT PHYSICAL THERAPY:Initial Assessment 2017        ICD-10: Treatment Diagnosis: Headache (R51)Cervicalgia (M54.2)  Precautions/Allergies: Other medication and Red wine extract   Fall Risk Score: 1 (? 5 = High Risk)  MD Orders: Evaluation and treatment MEDICAL/REFERRING DIAGNOSIS:  Neck pain [M54.2]   DATE OF ONSET: 6/10/17 when he started new medications to treat memory loss and ADD  REFERRING PHYSICIAN: DO MABEL Haque PHYSICIAN APPOINTMENT: TBD     INITIAL ASSESSMENT:  Mr. Trista Multani presents with headache pain, cervical spine facet joint dysfunction, and upper trapezius myofascial pain. His symptoms are consistent with cervicogenic headache. Recommend PT services to remeidate impairments. PROBLEM LIST (Impacting functional limitations):  1. Decreased Strength  2. Decreased ADL/Functional Activities  3. Increased Pain  4. Decreased Flexibility/Joint Mobility INTERVENTIONS PLANNED:  1. Cold  2. Cryotherapy  3. Electrical Stimulation  4. Heat  5. Home Exercise Program (HEP)  6. Manual Therapy  7. Neuromuscular Re-education/Strengthening  8. Range of Motion (ROM)  9. Therapeutic Activites  10. Therapeutic Exercise/Strengthening  11. Ultrasound (US)   TREATMENT PLAN:  Effective Dates: 17 TO 10/23/17. Frequency/Duration: 2 times a week for 12 weeks  GOALS: (Goals have been discussed and agreed upon with patient.)  Short-Term Functional Goals: Time Frame: 2 weeks  1. Patient will be independent with HEP without pain. 2. Patient will report headache frequency to less than 4 our of 7 days a week allowing him improved concentration. 3. Patient will have improve upright posture, reducing symptoms of headache pain. Discharge Goals: Time Frame: 12 weeks  1.  Patient will have NDI score improved to < 10 points allowing him improve community participation. 2. Patient will have improved C/S AROM to 80 degrees of bilateral neck rotation allowing him to look over his shoulder. 3. Patient will report headache frequency to < 1/7 days a week allowing him to return to PLOF. Rehabilitation Potential For Stated Goals: Good  Regarding Itzel Fuentes 's therapy, I certify that the treatment plan above will be carried out by a therapist or under their direction. Thank you for this referral,  Jocelyn Thomas, PT       Referring Physician Signature: Kianna Patterson DO              Date                      HISTORY:   History of Present Injury/Illness (Reason for Referral):  73 y/o M with c/o of upper neck and headache pain. He is familiar to clinic and had PT treatment for right sided neck and anterior shoulder pain. He had good success with PT and his symptoms were re-mediated within 4 sessions. He has chronic c/o of headache that comes and goes. His headache recently increased when he was being treated with medication with for memory loss and ADH. He is now off of the medications but he has posterior head headaches that radiate to B eyes/temporal area and constantly hurting on the top of the head. He reports MRI diagnosis of possible hydrocephalus but his neurologist Dr. Danyel Sevilla ruled out hydrocephalus. He also has recent onset vertigo, right ear pain, and hearing loss of right ear with weird noises. This is currently gone. Past Medical History/Comorbidities:   Mr. Orly Biggs  has a past medical history of ADD (attention deficit disorder); Anemia, unspecified; Aneurysm of unspecified site [facility]); Anxiety; Arthritis; Attention deficit disorder with hyperactivity; Benign paroxysmal vertigo; BPH (benign prostatic hypertrophy); Chronic prostatitis (11/15/2013);  Colitis, enteritis, and gastroenteritis of presumed infectious origin; Depressive disorder, not elsewhere classified; Disorders of bursae and tendons in shoulder region, unspecified; Fatty liver; GERD (gastroesophageal reflux disease); History of tetanus, diphtheria, and acellular pertussis booster vaccination (Tdap); Hypercholesteremia; Hypertrophy of prostate with urinary obstruction and other lower urinary tract symptoms (LUTS) (11/15/2013); Impotence of organic origin; Inguinal hernia (2014); Insomnia, unspecified; Medial epicondylitis of elbow; Nonspecific elevation of levels of transaminase or lactic acid dehydrogenase (LDH); Orthostatic hypotension; Osteoporosis (11/15/2013); Other and unspecified hyperlipidemia; Tremor; Type II or unspecified type diabetes mellitus without mention of complication, not stated as uncontrolled; and Vertigo. Mr. Khadra Cruz  has a past surgical history that includes orthopaedic (2010); heent; mohs procedure (Right); vasectomy; urological; urological; turp ([id number]); knee arthroscopy (Right, 2000); lumbar laminectomy (2/2012); shoulder arthroscopy (Right, 1995); tonsillectomy; cystoscopy,insert ureteral stent; and hernia repair (Bilateral, 2/3/14). Social History/Living Environment:     Lives with spouse  Prior Level of Function/Work/Activity:  Independent  Previous Treatment Approaches:          PT for neck and shoulder pain that remediated symptoms, CTSCAN, ENT, neurologist have ruled out vascular or neuro cause of pain    Current Medications:    Current Outpatient Prescriptions:     memantine (NAMENDA) 10 mg tablet, Take 1 Tab by mouth two (2) times a day., Disp: 60 Tab, Rfl: 9    multivitamin (ONE A DAY) tablet, Take 1 Tab by mouth daily. , Disp: , Rfl:     B.infantis-B.ani-B.long-B.bifi (PROBIOTIC 4X) 10-15 mg TbEC, Take  by mouth., Disp: , Rfl:     cholic acid 769 mg cap, Take 500 mg by mouth., Disp: , Rfl:     turmeric root extract 500 mg cap, 1 per mouth daily, Disp: , Rfl:     hyoscyamine (ANASPAZ, LEVSIN) 0.125 mg tablet, TK 1 T PO Q 4 H PRF ABDOMINAL PAIN OR CRAMPING, Disp: , Rfl: 3    propranolol (INDERAL) 10 mg tablet, TK 1 T PO BID FOR JITTERNESS, Disp: , Rfl: 0    clonazePAM (KLONOPIN) 1 mg tablet, 0.5 mg., Disp: , Rfl: 2    omeprazole (PRILOSEC) 40 mg capsule, , Disp: , Rfl: 4    CHOLINE PO, Take  by mouth., Disp: , Rfl:     cpap machine kit, by Does Not Apply route. autopap 5-15 cm, Disp: , Rfl:     triamterene-hydroCHLOROthiazide (DYAZIDE) 37.5-25 mg per capsule, Take  by mouth daily. , Disp: , Rfl:     potassium chloride SR (KLOR-CON 10) 10 mEq tablet, Take  by mouth., Disp: , Rfl:     magnesium gluconate 500 mg (27 mg  elemental) tablet, Take  by mouth two (2) times a day., Disp: , Rfl:     b complex vitamins tablet, Take  by mouth., Disp: , Rfl:     arginine 500 mg tablet, Take 500 mg by mouth., Disp: , Rfl:     omega-3 fatty acids-vitamin e (FISH OIL) 1,000 mg cap, Take 1 Cap by mouth., Disp: , Rfl:     simvastatin (ZOCOR) 20 mg tablet, Take 10 mg by mouth nightly., Disp: , Rfl:     co-enzyme Q-10 (CO Q-10) 100 mg capsule, Take 100 mg by mouth daily. , Disp: , Rfl:     Cholecalciferol, Vitamin D3, (VITAMIN D) 1,000 unit Cap, Take  by mouth daily. , Disp: , Rfl:     ASPIRIN 81 mg Tab, Take 2 Tabs by mouth daily. , Disp: , Rfl:    Date Last Reviewed:  7/27/2017   # of Personal Factors/Comorbidities that affect the Plan of Care: 1-2: MODERATE COMPLEXITY   EXAMINATION:   Observation/Orthostatic Postural Assessment: Forward head posture  Palpation:          Increased tone a pain B UT L>R, suboccipitals WNL, C/S lateral glides C3-6 hypomobile with pain  ROM:     C/S AROM  Left rotation 45  Right rotation 40 deg with upper neck pain    PROM CRLF WNL B      Strength:     WNL      Special Tests:          Transverse ligament negative,  ULNT negative, Spurlings negative  Neurological Screen:        Sensation: light touch intact B UE  Functional Mobility:         Gait/Ambulation:  WNL  Balance:          SLB poor B   Body Structures Involved:  1. Joints  2.  Muscles Body Functions Affected:  1. Neuromusculoskeletal  2. Movement Related Activities and Participation Affected:  1. General Tasks and Demands  2. Domestic Life  3. Interpersonal Interactions and Relationships  4. Community, Social and [place]   # of elements that affect the Plan of Care: 3: MODERATE COMPLEXITY   CLINICAL PRESENTATION:   Presentation: Evolving clinical presentation with changing clinical characteristics: MODERATE COMPLEXITY   CLINICAL DECISION MAKING:   Outcome Measure: Tool Used: Neck Disability Index (NDI)  Score:  Initial: 22/50  Most Recent: X/50 (Date: -- )   Interpretation of Score: The Neck Disability Index is a revised form of the Oswestry Low Back Pain Index and is designed to measure the activities of daily living in person's with neck pain. Each section is scored on a 0-5 scale, 5 representing the greatest disability. The scores of each section are added together for a total score of 50. Score 0 1-10 11-20 21-30 31-40 41-49 50   Modifier CH CI CJ CK CL CM CN     ? Changing and Maintaining Body Position:    [id number] - CURRENT STATUS: CK - 40%-59% impaired, limited or restricted    - GOAL STATUS: CI - 1%-19% impaired, limited or restricted    - D/C STATUS:  ---------------To be determined---------------      Medical Necessity:   · Patient is expected to demonstrate progress in strength, range of motion, balance and coordination to increase independence with looking over his shoulder. Reason for Services/Other Comments:  · Patient has been observed to have decrease range of motion  before, during or after an intervention. Use of outcome tool(s) and clinical judgement create a POC that gives a: Questionable prediction of patient's progress: MODERATE COMPLEXITY   TREATMENT:   (In addition to Assessment/Re-Assessment sessions the following treatments were rendered)THERAPEUTIC EXERCISE: (see grid for minutes):  Exercises per grid below to improve mobility, strength, balance and coordination. Required moderate visual, verbal, manual and tactile cues to promote proper body alignment, promote proper body posture and promote proper body mechanics. Progressed resistance, range and repetitions as indicated. MANUAL THERAPY: (see grid for minutes): Joint mobilization and Soft tissue mobilization was utilized and necessary because of the patient's restricted joint motion, painful spasm and loss of articular motion. MODALITIES: (see grid for minutes): *  Electrical Stimulation Therapy (IFC) was provided with intensity adjusted throughout treatment to patient tolerance. to reduce pain       *  Cold Pack Therapy in order to provide analgesia and reduce inflammation and edema. *  Hot Pack Therapy in order to relieve muscle spasm. Date: 7/27/17       Modalities:                                Therapeutic Exercise: 13 mins       Chin tuck 10x       C/S AROM rotation 10x       Scapula retraction and depression  10x10\"                               Proprioceptive Activities:                                Manual Therapy: 10 mins       FDN/FMR B UT Supine with C/S lateral glides gr 3/4               Therapeutic Activities:                                        HEP: HEP handout provided 7/27/17  Treatment/Session Assessment:  Demonstrated good teach back with HEP. · Pain/ Symptoms: Initial:   8/10 Post Session:  6/10 ·   Compliance with Program/Exercises: Will assess as treatment progresses. · Recommendations/Intent for next treatment session: \"Next visit will focus on advancements to more challenging activities\".   Total Treatment Duration:  PT Patient Time In/Time Out  Time In: 1445  Time Out: [id number] [facility],

## 2017-07-27 ENCOUNTER — HOSPITAL ENCOUNTER (OUTPATIENT)
Dept: PHYSICAL THERAPY | Age: 72
Discharge: HOME | End: 2017-07-27
Payer: MEDICARE

## 2017-07-27 PROCEDURE — 97140 MANUAL THERAPY 1/> REGIONS: CPT

## 2017-07-27 PROCEDURE — G8982 BODY POS GOAL STATUS: HCPCS

## 2017-07-27 PROCEDURE — G8981 BODY POS CURRENT STATUS: HCPCS

## 2017-07-27 PROCEDURE — 97110 THERAPEUTIC EXERCISES: CPT

## 2017-07-27 PROCEDURE — 97162 PT EVAL MOD COMPLEX 30 MIN: CPT

## 2017-07-31 ENCOUNTER — HOSPITAL ENCOUNTER (OUTPATIENT)
Dept: PHYSICAL THERAPY | Age: 72
Discharge: HOME | End: 2017-07-31
Payer: MEDICARE

## 2017-07-31 PROCEDURE — 97110 THERAPEUTIC EXERCISES: CPT

## 2017-07-31 PROCEDURE — 97140 MANUAL THERAPY 1/> REGIONS: CPT

## 2017-07-31 NOTE — PROGRESS NOTES
Reji Montiel. : 1945 [address] [facility]  [address] [place] [address].  [phone]   [phone]        OUTPATIENT PHYSICAL THERAPY:Daily Note 2017        ICD-10: Treatment Diagnosis: Headache (R51)Cervicalgia (M54.2)  Precautions/Allergies: Other medication and Red wine extract   Fall Risk Score: 1 (? 5 = High Risk)  MD Orders: Evaluation and treatment MEDICAL/REFERRING DIAGNOSIS:  Neck pain [M54.2]   DATE OF ONSET: 6/10/17 when he started new medications to treat memory loss and ADD  REFERRING PHYSICIAN: Kenn Fink DO  RETURN PHYSICIAN APPOINTMENT: TBD     INITIAL ASSESSMENT:  Mr. Romie Severe presents with headache pain, cervical spine facet joint dysfunction, and upper trapezius myofascial pain. His symptoms are consistent with cervicogenic headache. Recommend PT services to remeidate impairments. PROBLEM LIST (Impacting functional limitations):  1. Decreased Strength  2. Decreased ADL/Functional Activities  3. Increased Pain  4. Decreased Flexibility/Joint Mobility INTERVENTIONS PLANNED:  1. Cold  2. Cryotherapy  3. Electrical Stimulation  4. Heat  5. Home Exercise Program (HEP)  6. Manual Therapy  7. Neuromuscular Re-education/Strengthening  8. Range of Motion (ROM)  9. Therapeutic Activites  10. Therapeutic Exercise/Strengthening  11. Ultrasound (US)   TREATMENT PLAN:  Effective Dates: 17 TO 10/23/17. Frequency/Duration: 2 times a week for 12 weeks  GOALS: (Goals have been discussed and agreed upon with patient.)  Short-Term Functional Goals: Time Frame: 2 weeks  1. Patient will be independent with HEP without pain. 2. Patient will report headache frequency to less than 4 our of 7 days a week allowing him improved concentration. 3. Patient will have improve upright posture, reducing symptoms of headache pain. Discharge Goals: Time Frame: 12 weeks  1.  Patient will have NDI score improved to < 10 points allowing him improve community participation. 2. Patient will have improved C/S AROM to 80 degrees of bilateral neck rotation allowing him to look over his shoulder. 3. Patient will report headache frequency to < 1/7 days a week allowing him to return to PLOF. Rehabilitation Potential For Stated Goals: Good                HISTORY:   History of Present Injury/Illness (Reason for Referral):  73 y/o M with c/o of upper neck and headache pain. He is familiar to clinic and had PT treatment for right sided neck and anterior shoulder pain. He had good success with PT and his symptoms were re-mediated within 4 sessions. He has chronic c/o of headache that comes and goes. His headache recently increased when he was being treated with medication with for memory loss and ADH. He is now off of the medications but he has posterior head headaches that radiate to B eyes/temporal area and constantly hurting on the top of the head. He reports MRI diagnosis of possible hydrocephalus but his neurologist Dr. Lorene Mclaughlin ruled out hydrocephalus. He also has recent onset vertigo, right ear pain, and hearing loss of right ear with weird noises. This is currently gone. Past Medical History/Comorbidities:   Mr. Kendall Allen  has a past medical history of ADD (attention deficit disorder); Anemia, unspecified; Aneurysm of unspecified site [facility]); Anxiety; Arthritis; Attention deficit disorder with hyperactivity; Benign paroxysmal vertigo; BPH (benign prostatic hypertrophy); Chronic prostatitis (11/15/2013); Colitis, enteritis, and gastroenteritis of presumed infectious origin; Depressive disorder, not elsewhere classified; Disorders of bursae and tendons in shoulder region, unspecified; Fatty liver; GERD (gastroesophageal reflux disease); History of tetanus, diphtheria, and acellular pertussis booster vaccination (Tdap); Hypercholesteremia;  Hypertrophy of prostate with urinary obstruction and other lower urinary tract symptoms (LUTS) (11/15/2013); Impotence of organic origin; Inguinal hernia (2014); Insomnia, unspecified; Medial epicondylitis of elbow; Nonspecific elevation of levels of transaminase or lactic acid dehydrogenase (LDH); Orthostatic hypotension; Osteoporosis (11/15/2013); Other and unspecified hyperlipidemia; Tremor; Type II or unspecified type diabetes mellitus without mention of complication, not stated as uncontrolled; and Vertigo. Mr. Montserrat Barnes  has a past surgical history that includes orthopaedic (2010); heent; mohs procedure (Right); vasectomy; urological; urological; turp ([id number]); knee arthroscopy (Right, 2000); lumbar laminectomy (2/2012); shoulder arthroscopy (Right, 1995); tonsillectomy; cystoscopy,insert ureteral stent; and hernia repair (Bilateral, 2/3/14). Social History/Living Environment:     Lives with spouse  Prior Level of Function/Work/Activity:  Independent  Previous Treatment Approaches:          PT for neck and shoulder pain that remediated symptoms, CTSCAN, ENT, neurologist have ruled out vascular or neuro cause of pain    Current Medications:    Current Outpatient Prescriptions:     memantine (NAMENDA) 10 mg tablet, Take 1 Tab by mouth two (2) times a day., Disp: 60 Tab, Rfl: 9    multivitamin (ONE A DAY) tablet, Take 1 Tab by mouth daily. , Disp: , Rfl:     B.infantis-B.ani-B.long-B.bifi (PROBIOTIC 4X) 10-15 mg TbEC, Take  by mouth., Disp: , Rfl:     cholic acid 698 mg cap, Take 500 mg by mouth., Disp: , Rfl:     turmeric root extract 500 mg cap, 1 per mouth daily, Disp: , Rfl:     hyoscyamine (ANASPAZ, LEVSIN) 0.125 mg tablet, TK 1 T PO Q 4 H PRF ABDOMINAL PAIN OR CRAMPING, Disp: , Rfl: 3    propranolol (INDERAL) 10 mg tablet, TK 1 T PO BID FOR JITTERNESS, Disp: , Rfl: 0    clonazePAM (KLONOPIN) 1 mg tablet, 0.5 mg., Disp: , Rfl: 2    omeprazole (PRILOSEC) 40 mg capsule, , Disp: , Rfl: 4    CHOLINE PO, Take  by mouth., Disp: , Rfl:     cpap machine kit, by Does Not Apply route.  autopap 5-15 cm, Disp: , Rfl:     triamterene-hydroCHLOROthiazide (DYAZIDE) 37.5-25 mg per capsule, Take  by mouth daily. , Disp: , Rfl:     potassium chloride SR (KLOR-CON 10) 10 mEq tablet, Take  by mouth., Disp: , Rfl:     magnesium gluconate 500 mg (27 mg  elemental) tablet, Take  by mouth two (2) times a day., Disp: , Rfl:     b complex vitamins tablet, Take  by mouth., Disp: , Rfl:     arginine 500 mg tablet, Take 500 mg by mouth., Disp: , Rfl:     omega-3 fatty acids-vitamin e (FISH OIL) 1,000 mg cap, Take 1 Cap by mouth., Disp: , Rfl:     simvastatin (ZOCOR) 20 mg tablet, Take 10 mg by mouth nightly., Disp: , Rfl:     co-enzyme Q-10 (CO Q-10) 100 mg capsule, Take 100 mg by mouth daily. , Disp: , Rfl:     Cholecalciferol, Vitamin D3, (VITAMIN D) 1,000 unit Cap, Take  by mouth daily. , Disp: , Rfl:     ASPIRIN 81 mg Tab, Take 2 Tabs by mouth daily. , Disp: , Rfl:    Date Last Reviewed:  7/31/2017   EXAMINATION:   Observation/Orthostatic Postural Assessment: Forward head posture  Palpation:          Increased tone a pain B UT L>R, suboccipitals WNL, C/S lateral glides C3-6 hypomobile with pain  ROM:     C/S AROM  Left rotation 45  Right rotation 40 deg with upper neck pain    PROM CRLF WNL B      Strength:     WNL      Special Tests:          Transverse ligament negative,  ULNT negative, Spurlings negative  Neurological Screen:        Sensation: light touch intact B UE  Functional Mobility:         Gait/Ambulation:  WNL  Balance:          SLB poor B   Body Structures Involved:  1. Joints  2. Muscles Body Functions Affected:  1. Neuromusculoskeletal  2. Movement Related Activities and Participation Affected:  1. General Tasks and Demands  2. Domestic Life  3. Interpersonal Interactions and Relationships  4. Community, Social and Civic Life   CLINICAL PRESENTATION:   CLINICAL DECISION MAKING:   Outcome Measure:    Tool Used: Neck Disability Index (NDI)  Score:  Initial: 22/50  Most Recent: X/50 (Date: -- )   Interpretation of Score: The Neck Disability Index is a revised form of the Oswestry Low Back Pain Index and is designed to measure the activities of daily living in person's with neck pain. Each section is scored on a 0-5 scale, 5 representing the greatest disability. The scores of each section are added together for a total score of 50. Score 0 1-10 11-20 21-30 31-40 41-49 50   Modifier CH CI CJ CK CL CM CN     ? Changing and Maintaining Body Position:    [id number] - CURRENT STATUS: CK - 40%-59% impaired, limited or restricted    - GOAL STATUS: CI - 1%-19% impaired, limited or restricted    - D/C STATUS:  ---------------To be determined---------------      Medical Necessity:   · Patient is expected to demonstrate progress in strength, range of motion, balance and coordination to increase independence with looking over his shoulder. Reason for Services/Other Comments:  · Patient has been observed to have decrease range of motion  before, during or after an intervention. TREATMENT:   (In addition to Assessment/Re-Assessment sessions the following treatments were rendered)THERAPEUTIC EXERCISE: (see grid for minutes):  Exercises per grid below to improve mobility, strength, balance and coordination. Required moderate visual, verbal, manual and tactile cues to promote proper body alignment, promote proper body posture and promote proper body mechanics. Progressed resistance, range and repetitions as indicated. MANUAL THERAPY: (see grid for minutes): Joint mobilization and Soft tissue mobilization was utilized and necessary because of the patient's restricted joint motion, painful spasm and loss of articular motion. MODALITIES: (see grid for minutes): *  Electrical Stimulation Therapy (IFC) was provided with intensity adjusted throughout treatment to patient tolerance. to reduce pain       *  Cold Pack Therapy in order to provide analgesia and reduce inflammation and edema.        *  Hot Pack Therapy in order to relieve muscle spasm. Date: 7/27/17 7/31/17      Modalities:                                Therapeutic Exercise: 13 mins 15 min      Chin tuck 10x 10x supine, 5x at 45 deg rot      C/S AROM rotation 10x 10x supine      Scapula retraction and depression  10x10\" 10x10\"      C/S ball on wall   Retractions 10x5\", Rot 10x B                      Proprioceptive Activities:                                Manual Therapy: 10 mins 30 min      FDN/FMR B UT Supine with C/S lateral glides gr 3/4 Prone FDN B SO and UT, supine STM B UTs and SOR, R 1st rib MET              Therapeutic Activities:                                        HEP: HEP handout provided 7/27/17  Treatment/Session Assessment:  He reports headache frequency has decreased from daily to only provoked when he bends over, sneezes, or coughs. His headache duration is short and they go away quickly. · Pain/ Symptoms: Initial:   0/10 \"I do not have pain now but I have pain if I cough, sneeze, or lean forward. \" Post Session:  0/10 ·   Compliance with Program/Exercises: Will assess as treatment progresses. · Recommendations/Intent for next treatment session: \"Next visit will focus on advancements to more challenging activities\".   Total Treatment Duration:  PT Patient Time In/Time Out  Time In: 1530  Time Out: 37 Hannah Harris PT

## 2017-08-02 ENCOUNTER — HOSPITAL ENCOUNTER (OUTPATIENT)
Dept: PHYSICAL THERAPY | Age: 72
Discharge: HOME | End: 2017-08-02
Payer: MEDICARE

## 2017-08-02 PROCEDURE — 97110 THERAPEUTIC EXERCISES: CPT

## 2017-08-02 PROCEDURE — 97140 MANUAL THERAPY 1/> REGIONS: CPT

## 2017-08-02 NOTE — PROGRESS NOTES
Tho Flores. : 1945 [address] [place] [address],[address]  [address] [place] [address].  [phone]   [phone]        OUTPATIENT PHYSICAL THERAPY:Daily Note 2017        ICD-10: Treatment Diagnosis: Headache (R51)Cervicalgia (M54.2)  Precautions/Allergies: Other medication and Red wine extract   Fall Risk Score: 1 (? 5 = High Risk)  MD Orders: Evaluation and treatment MEDICAL/REFERRING DIAGNOSIS:  Neck pain [M54.2]   DATE OF ONSET: 6/10/17 when he started new medications to treat memory loss and ADD  REFERRING PHYSICIAN: DO MABEL Hills PHYSICIAN APPOINTMENT: TBJOAN     INITIAL ASSESSMENT:  Mr. Tramaine Barreto presents with headache pain, cervical spine facet joint dysfunction, and upper trapezius myofascial pain. His symptoms are consistent with cervicogenic headache. Recommend PT services to remeidate impairments. PROBLEM LIST (Impacting functional limitations):  1. Decreased Strength  2. Decreased ADL/Functional Activities  3. Increased Pain  4. Decreased Flexibility/Joint Mobility INTERVENTIONS PLANNED:  1. Cold  2. Cryotherapy  3. Electrical Stimulation  4. Heat  5. Home Exercise Program (HEP)  6. Manual Therapy  7. Neuromuscular Re-education/Strengthening  8. Range of Motion (ROM)  9. Therapeutic Activites  10. Therapeutic Exercise/Strengthening  11. Ultrasound (US)   TREATMENT PLAN:  Effective Dates: 17 TO 10/23/17. Frequency/Duration: 2 times a week for 12 weeks  GOALS: (Goals have been discussed and agreed upon with patient.)  Short-Term Functional Goals: Time Frame: 2 weeks  1. Patient will be independent with HEP without pain. 2. Patient will report headache frequency to less than 4 our of 7 days a week allowing him improved concentration. 3. Patient will have improve upright posture, reducing symptoms of headache pain. Discharge Goals: Time Frame: 12 weeks  1.  Patient will have NDI score improved to < 10 points allowing him improve community participation. 2. Patient will have improved C/S AROM to 80 degrees of bilateral neck rotation allowing him to look over his shoulder. 3. Patient will report headache frequency to < 1/7 days a week allowing him to return to PLOF. Rehabilitation Potential For Stated Goals: Good                HISTORY:   History of Present Injury/Illness (Reason for Referral):  75 y/o M with c/o of upper neck and headache pain. He is familiar to clinic and had PT treatment for right sided neck and anterior shoulder pain. He had good success with PT and his symptoms were re-mediated within 4 sessions. He has chronic c/o of headache that comes and goes. His headache recently increased when he was being treated with medication with for memory loss and ADH. He is now off of the medications but he has posterior head headaches that radiate to B eyes/temporal area and constantly hurting on the top of the head. He reports MRI diagnosis of possible hydrocephalus but his neurologist Dr. Tracy Jacob ruled out hydrocephalus. He also has recent onset vertigo, right ear pain, and hearing loss of right ear with weird noises. This is currently gone. Past Medical History/Comorbidities:   Mr. Nancy Archibald  has a past medical history of ADD (attention deficit disorder); Anemia, unspecified; Aneurysm of unspecified site [facility]); Anxiety; Arthritis; Attention deficit disorder with hyperactivity; Benign paroxysmal vertigo; BPH (benign prostatic hypertrophy); Chronic prostatitis (11/15/2013); Colitis, enteritis, and gastroenteritis of presumed infectious origin; Depressive disorder, not elsewhere classified; Disorders of bursae and tendons in shoulder region, unspecified; Fatty liver; GERD (gastroesophageal reflux disease); History of tetanus, diphtheria, and acellular pertussis booster vaccination (Tdap); Hypercholesteremia;  Hypertrophy of prostate with urinary obstruction and other lower urinary tract symptoms (LUTS) (11/15/2013); Impotence of organic origin; Inguinal hernia (2014); Insomnia, unspecified; Medial epicondylitis of elbow; Nonspecific elevation of levels of transaminase or lactic acid dehydrogenase (LDH); Orthostatic hypotension; Osteoporosis (11/15/2013); Other and unspecified hyperlipidemia; Tremor; Type II or unspecified type diabetes mellitus without mention of complication, not stated as uncontrolled; and Vertigo. Mr. Rosangela Mckeon  has a past surgical history that includes orthopaedic (2010); heent; mohs procedure (Right); vasectomy; urological; urological; turp (1884,9886); knee arthroscopy (Right, 2000); lumbar laminectomy (2/2012); shoulder arthroscopy (Right, 1995); tonsillectomy; cystoscopy,insert ureteral stent; and hernia repair (Bilateral, 2/3/14). Social History/Living Environment:     Lives with spouse  Prior Level of Function/Work/Activity:  Independent  Previous Treatment Approaches:          PT for neck and shoulder pain that remediated symptoms, CTSCAN, ENT, neurologist have ruled out vascular or neuro cause of pain    Current Medications:    Current Outpatient Prescriptions:     memantine (NAMENDA) 10 mg tablet, Take 1 Tab by mouth two (2) times a day., Disp: 60 Tab, Rfl: 9    multivitamin (ONE A DAY) tablet, Take 1 Tab by mouth daily. , Disp: , Rfl:     B.infantis-B.ani-B.long-B.bifi (PROBIOTIC 4X) 10-15 mg TbEC, Take  by mouth., Disp: , Rfl:     cholic acid 103 mg cap, Take 500 mg by mouth., Disp: , Rfl:     turmeric root extract 500 mg cap, 1 per mouth daily, Disp: , Rfl:     hyoscyamine (ANASPAZ, LEVSIN) 0.125 mg tablet, TK 1 T PO Q 4 H PRF ABDOMINAL PAIN OR CRAMPING, Disp: , Rfl: 3    propranolol (INDERAL) 10 mg tablet, TK 1 T PO BID FOR JITTERNESS, Disp: , Rfl: 0    clonazePAM (KLONOPIN) 1 mg tablet, 0.5 mg., Disp: , Rfl: 2    omeprazole (PRILOSEC) 40 mg capsule, , Disp: , Rfl: 4    CHOLINE PO, Take  by mouth., Disp: , Rfl:     cpap machine kit, by Does Not Apply route.  autopap 5-15 cm, Disp: , Rfl:     triamterene-hydroCHLOROthiazide (DYAZIDE) 37.5-25 mg per capsule, Take  by mouth daily. , Disp: , Rfl:     potassium chloride SR (KLOR-CON 10) 10 mEq tablet, Take  by mouth., Disp: , Rfl:     magnesium gluconate 500 mg (27 mg  elemental) tablet, Take  by mouth two (2) times a day., Disp: , Rfl:     b complex vitamins tablet, Take  by mouth., Disp: , Rfl:     arginine 500 mg tablet, Take 500 mg by mouth., Disp: , Rfl:     omega-3 fatty acids-vitamin e (FISH OIL) 1,000 mg cap, Take 1 Cap by mouth., Disp: , Rfl:     simvastatin (ZOCOR) 20 mg tablet, Take 10 mg by mouth nightly., Disp: , Rfl:     co-enzyme Q-10 (CO Q-10) 100 mg capsule, Take 100 mg by mouth daily. , Disp: , Rfl:     Cholecalciferol, Vitamin D3, (VITAMIN D) 1,000 unit Cap, Take  by mouth daily. , Disp: , Rfl:     ASPIRIN 81 mg Tab, Take 2 Tabs by mouth daily. , Disp: , Rfl:    Date Last Reviewed:  8/2/2017   EXAMINATION:   Observation/Orthostatic Postural Assessment: Forward head posture  Palpation:          Increased tone a pain B UT L>R, suboccipitals WNL, C/S lateral glides C3-6 hypomobile with pain  ROM:     C/S AROM  Left rotation 45  Right rotation 40 deg with upper neck pain    PROM CRLF WNL B      Strength:     WNL      Special Tests:          Transverse ligament negative,  ULNT negative, Spurlings negative  Neurological Screen:        Sensation: light touch intact B UE  Functional Mobility:         Gait/Ambulation:  WNL  Balance:          SLB poor B   Body Structures Involved:  1. Joints  2. Muscles Body Functions Affected:  1. Neuromusculoskeletal  2. Movement Related Activities and Participation Affected:  1. General Tasks and Demands  2. Domestic Life  3. Interpersonal Interactions and Relationships  4. Community, Social and Civic Life   CLINICAL PRESENTATION:   CLINICAL DECISION MAKING:   Outcome Measure:    Tool Used: Neck Disability Index (NDI)  Score:  Initial: 22/50  Most Recent: X/50 (Date: -- )   Interpretation of Score: The Neck Disability Index is a revised form of the Oswestry Low Back Pain Index and is designed to measure the activities of daily living in person's with neck pain. Each section is scored on a 0-5 scale, 5 representing the greatest disability. The scores of each section are added together for a total score of 50. Score 0 1-10 11-20 21-30 31-40 41-49 50   Modifier CH CI CJ CK CL CM CN     ? Changing and Maintaining Body Position:    [id number] - CURRENT STATUS: CK - 40%-59% impaired, limited or restricted    - GOAL STATUS: CI - 1%-19% impaired, limited or restricted    - D/C STATUS:  ---------------To be determined---------------      Medical Necessity:   · Patient is expected to demonstrate progress in strength, range of motion, balance and coordination to increase independence with looking over his shoulder. Reason for Services/Other Comments:  · Patient has been observed to have decrease range of motion  before, during or after an intervention. TREATMENT:   (In addition to Assessment/Re-Assessment sessions the following treatments were rendered)THERAPEUTIC EXERCISE: (see grid for minutes):  Exercises per grid below to improve mobility, strength, balance and coordination. Required moderate visual, verbal, manual and tactile cues to promote proper body alignment, promote proper body posture and promote proper body mechanics. Progressed resistance, range and repetitions as indicated. MANUAL THERAPY: (see grid for minutes): Joint mobilization and Soft tissue mobilization was utilized and necessary because of the patient's restricted joint motion, painful spasm and loss of articular motion. MODALITIES: (see grid for minutes): *  Electrical Stimulation Therapy (IFC) was provided with intensity adjusted throughout treatment to patient tolerance. to reduce pain       *  Cold Pack Therapy in order to provide analgesia and reduce inflammation and edema.        *  Hot Pack Therapy in order to relieve muscle spasm. Date: 7/27/17 7/31/17 8/2/17     Modalities:                                Therapeutic Exercise: 13 mins 15 min 15 mins     Chin tuck 10x 10x supine, 5x at 45 deg rot 10x each      C/S AROM rotation 10x 10x supine 10x     Scapula retraction and depression  10x10\" 10x10\" TB rows silver and Extension dark blue with scapula [id number] each     C/S ball on wall   Retractions 10x5\", Rot 10x B 10x5\" retraction 10x B Rot     Quad SA punches, cat/cow, C/S extension   10x each             Proprioceptive Activities:                                Manual Therapy: 10 mins 30 min 30 mins     FDN/FMR B UT Supine with C/S lateral glides gr 3/4 Prone FDN B SO and UT, supine STM B UTs and SOR, R 1st rib MET Repeat             Therapeutic Activities:                                        HEP: HEP handout provided 7/27/17  Treatment/Session Assessment:  He has kyphotic posture that improves mildly with tc/vc and postural correction exercise. Con't with POC. · Pain/ Symptoms: Initial:   0/10 \"I am taking ibuprofen and my headache pain is gone. \" Post Session:  0/10 ·   Compliance with Program/Exercises: Will assess as treatment progresses. · Recommendations/Intent for next treatment session: \"Next visit will focus on advancements to more challenging activities\".   Total Treatment Duration:  PT Patient Time In/Time Out  Time In: 1445  Time Out: [id number] [facility], PT

## 2017-08-07 ENCOUNTER — HOSPITAL ENCOUNTER (OUTPATIENT)
Dept: PHYSICAL THERAPY | Age: 72
Discharge: HOME | End: 2017-08-07
Payer: MEDICARE

## 2017-08-07 PROCEDURE — 97140 MANUAL THERAPY 1/> REGIONS: CPT

## 2017-08-07 PROCEDURE — 97110 THERAPEUTIC EXERCISES: CPT

## 2017-08-07 NOTE — PROGRESS NOTES
Riki Perez. : 1945 [address]  [address] [address].  [phone]   [phone]        OUTPATIENT PHYSICAL THERAPY:Daily Note 2017        ICD-10: Treatment Diagnosis: Headache (R51)Cervicalgia (M54.2)  Precautions/Allergies: Other medication and Red wine extract   Fall Risk Score: 1 (? 5 = High Risk)  MD Orders: Evaluation and treatment MEDICAL/REFERRING DIAGNOSIS:  Neck pain [M54.2]   DATE OF ONSET: 6/10/17 when he started new medications to treat memory loss and ADD  REFERRING PHYSICIAN: Mary Louise DO  RETURN PHYSICIAN APPOINTMENT: TBD     INITIAL ASSESSMENT:  Mr. Zeynep Carreon presents with headache pain, cervical spine facet joint dysfunction, and upper trapezius myofascial pain. His symptoms are consistent with cervicogenic headache. Recommend PT services to remeidate impairments. PROBLEM LIST (Impacting functional limitations):  1. Decreased Strength  2. Decreased ADL/Functional Activities  3. Increased Pain  4. Decreased Flexibility/Joint Mobility INTERVENTIONS PLANNED:  1. Cold  2. Cryotherapy  3. Electrical Stimulation  4. Heat  5. Home Exercise Program (HEP)  6. Manual Therapy  7. Neuromuscular Re-education/Strengthening  8. Range of Motion (ROM)  9. Therapeutic Activites  10. Therapeutic Exercise/Strengthening  11. Ultrasound (US)   TREATMENT PLAN:  Effective Dates: 17 TO 10/23/17. Frequency/Duration: 2 times a week for 12 weeks  GOALS: (Goals have been discussed and agreed upon with patient.)  Short-Term Functional Goals: Time Frame: 2 weeks  1. Patient will be independent with HEP without pain.(MET)  2. Patient will report headache frequency to less than 4 our of 7 days a week allowing him improved concentration.(progressing)   3. Patient will have improve upright posture, reducing symptoms of headache pain. (MET)  Discharge Goals: Time Frame: 12 weeks  1.  Patient will have NDI score improved to < 10 points allowing him improve community participation. 2. Patient will have improved C/S AROM to 80 degrees of bilateral neck rotation allowing him to look over his shoulder.(progressing)  3. Patient will report headache frequency to < 1/7 days a week allowing him to return to PLOF. Rehabilitation Potential For Stated Goals: Good                HISTORY:   History of Present Injury/Illness (Reason for Referral):  73 y/o M with c/o of upper neck and headache pain. He is familiar to clinic and had PT treatment for right sided neck and anterior shoulder pain. He had good success with PT and his symptoms were re-mediated within 4 sessions. He has chronic c/o of headache that comes and goes. His headache recently increased when he was being treated with medication with for memory loss and ADH. He is now off of the medications but he has posterior head headaches that radiate to B eyes/temporal area and constantly hurting on the top of the head. He reports MRI diagnosis of possible hydrocephalus but his neurologist Dr. Wen Cole ruled out hydrocephalus. He also has recent onset vertigo, right ear pain, and hearing loss of right ear with weird noises. This is currently gone. Past Medical History/Comorbidities:   Mr. Ruthanna Lesch  has a past medical history of ADD (attention deficit disorder); Anemia, unspecified; Aneurysm of unspecified site [facility]); Anxiety; Arthritis; Attention deficit disorder with hyperactivity; Benign paroxysmal vertigo; BPH (benign prostatic hypertrophy); Chronic prostatitis (11/15/2013); Colitis, enteritis, and gastroenteritis of presumed infectious origin; Depressive disorder, not elsewhere classified; Disorders of bursae and tendons in shoulder region, unspecified; Fatty liver; GERD (gastroesophageal reflux disease); History of tetanus, diphtheria, and acellular pertussis booster vaccination (Tdap); Hypercholesteremia;  Hypertrophy of prostate with urinary obstruction and other lower urinary tract symptoms (LUTS) (11/15/2013); Impotence of organic origin; Inguinal hernia (2014); Insomnia, unspecified; Medial epicondylitis of elbow; Nonspecific elevation of levels of transaminase or lactic acid dehydrogenase (LDH); Orthostatic hypotension; Osteoporosis (11/15/2013); Other and unspecified hyperlipidemia; Tremor; Type II or unspecified type diabetes mellitus without mention of complication, not stated as uncontrolled; and Vertigo. Mr. Lauren Adler  has a past surgical history that includes orthopaedic (2010); heent; mohs procedure (Right); vasectomy; urological; urological; turp ([id number]); knee arthroscopy (Right, 2000); lumbar laminectomy (2/2012); shoulder arthroscopy (Right, 1995); tonsillectomy; cystoscopy,insert ureteral stent; and hernia repair (Bilateral, 2/3/14). Social History/Living Environment:     Lives with spouse  Prior Level of Function/Work/Activity:  Independent  Previous Treatment Approaches:          PT for neck and shoulder pain that remediated symptoms, CTSCAN, ENT, neurologist have ruled out vascular or neuro cause of pain    Current Medications:    Current Outpatient Prescriptions:     memantine (NAMENDA) 10 mg tablet, Take 1 Tab by mouth two (2) times a day., Disp: 60 Tab, Rfl: 9    multivitamin (ONE A DAY) tablet, Take 1 Tab by mouth daily. , Disp: , Rfl:     B.infantis-B.ani-B.long-B.bifi (PROBIOTIC 4X) 10-15 mg TbEC, Take  by mouth., Disp: , Rfl:     cholic acid 532 mg cap, Take 500 mg by mouth., Disp: , Rfl:     turmeric root extract 500 mg cap, 1 per mouth daily, Disp: , Rfl:     hyoscyamine (ANASPAZ, LEVSIN) 0.125 mg tablet, TK 1 T PO Q 4 H PRF ABDOMINAL PAIN OR CRAMPING, Disp: , Rfl: 3    propranolol (INDERAL) 10 mg tablet, TK 1 T PO BID FOR JITTERNESS, Disp: , Rfl: 0    clonazePAM (KLONOPIN) 1 mg tablet, 0.5 mg., Disp: , Rfl: 2    omeprazole (PRILOSEC) 40 mg capsule, , Disp: , Rfl: 4    CHOLINE PO, Take  by mouth., Disp: , Rfl:     cpap machine kit, by Does Not Apply route. autopap 5-15 cm, Disp: , Rfl:     triamterene-hydroCHLOROthiazide (DYAZIDE) 37.5-25 mg per capsule, Take  by mouth daily. , Disp: , Rfl:     potassium chloride SR (KLOR-CON 10) 10 mEq tablet, Take  by mouth., Disp: , Rfl:     magnesium gluconate 500 mg (27 mg  elemental) tablet, Take  by mouth two (2) times a day., Disp: , Rfl:     b complex vitamins tablet, Take  by mouth., Disp: , Rfl:     arginine 500 mg tablet, Take 500 mg by mouth., Disp: , Rfl:     omega-3 fatty acids-vitamin e (FISH OIL) 1,000 mg cap, Take 1 Cap by mouth., Disp: , Rfl:     simvastatin (ZOCOR) 20 mg tablet, Take 10 mg by mouth nightly., Disp: , Rfl:     co-enzyme Q-10 (CO Q-10) 100 mg capsule, Take 100 mg by mouth daily. , Disp: , Rfl:     Cholecalciferol, Vitamin D3, (VITAMIN D) 1,000 unit Cap, Take  by mouth daily. , Disp: , Rfl:     ASPIRIN 81 mg Tab, Take 2 Tabs by mouth daily. , Disp: , Rfl:    Date Last Reviewed:  8/7/2017   EXAMINATION:   Observation/Orthostatic Postural Assessment: Forward head posture  Palpation:          Increased tone a pain B UT L>R, suboccipitals WNL, C/S lateral glides C3-6 hypomobile with pain  ROM:     C/S AROM  Left rotation 45  Right rotation 40 deg with upper neck pain    PROM CRLF WNL B   8/7/17  AROM C/S  RR 65 deg   LR 70 deg   Strength:     WNL      Special Tests:          Transverse ligament negative,  ULNT negative, Spurlings negative  Neurological Screen:        Sensation: light touch intact B UE  Functional Mobility:         Gait/Ambulation:  WNL  Balance:          SLB poor B   Body Structures Involved:  1. Joints  2. Muscles Body Functions Affected:  1. Neuromusculoskeletal  2. Movement Related Activities and Participation Affected:  1. General Tasks and Demands  2. Domestic Life  3. Interpersonal Interactions and Relationships  4. Community, Social and Civic Life   CLINICAL PRESENTATION:   CLINICAL DECISION MAKING:   Outcome Measure:    Tool Used: Neck Disability Index (NDI)  Score:  Initial: 22/50  Most Recent: X/50 (Date: -- )   Interpretation of Score: The Neck Disability Index is a revised form of the Oswestry Low Back Pain Index and is designed to measure the activities of daily living in person's with neck pain. Each section is scored on a 0-5 scale, 5 representing the greatest disability. The scores of each section are added together for a total score of 50. Score 0 1-10 11-20 21-30 31-40 41-49 50   Modifier CH CI CJ CK CL CM CN     ? Changing and Maintaining Body Position:    [id number] - CURRENT STATUS: CK - 40%-59% impaired, limited or restricted    - GOAL STATUS: CI - 1%-19% impaired, limited or restricted    - D/C STATUS:  ---------------To be determined---------------      Medical Necessity:   · Patient is expected to demonstrate progress in strength, range of motion, balance and coordination to increase independence with looking over his shoulder. Reason for Services/Other Comments:  · Patient has been observed to have decrease range of motion  before, during or after an intervention. TREATMENT:   (In addition to Assessment/Re-Assessment sessions the following treatments were rendered)THERAPEUTIC EXERCISE: (see grid for minutes):  Exercises per grid below to improve mobility, strength, balance and coordination. Required moderate visual, verbal, manual and tactile cues to promote proper body alignment, promote proper body posture and promote proper body mechanics. Progressed resistance, range and repetitions as indicated. MANUAL THERAPY: (see grid for minutes): Joint mobilization and Soft tissue mobilization was utilized and necessary because of the patient's restricted joint motion, painful spasm and loss of articular motion. MODALITIES: (see grid for minutes): *  Electrical Stimulation Therapy (IFC) was provided with intensity adjusted throughout treatment to patient tolerance.  to reduce pain       *  Cold Pack Therapy in order to provide analgesia and reduce inflammation and edema. *  Hot Pack Therapy in order to relieve muscle spasm. Date: 7/27/17 7/31/17 8/2/17 8/7/17  (visit 4)    Modalities:                                Therapeutic Exercise: 13 mins 15 min 15 mins 25 mins    Chin tuck 10x 10x supine, 5x at 45 deg rot 10x each  10x    C/S AROM rotation 10x 10x supine 10x 10x    Scapula retraction and depression  10x10\" 10x10\" TB rows silver and Extension dark blue with scapula [id number] each 3x10 each silver band    C/S ball on wall   Retractions 10x5\", Rot 10x B 10x5\" retraction 10x B Rot 10x5\", 10x rot    Quad SA punches, cat/cow, C/S extension   10x each 10x    UBE    2' each direction L4    Proprioceptive Activities:                                Manual Therapy: 10 mins 30 min 30 mins 15 min    FDN/FMR B UT Supine with C/S lateral glides gr 3/4 Prone FDN B SO and UT, supine STM B UTs and SOR, R 1st rib MET Repeat MFD B UT, scalenes, SOR, PROM into rotation            Therapeutic Activities:                                        HEP: HEP handout provided 7/27/17  Treatment/Session Assessment:  He tolerated exercise progression without pain. He has less c/o of \"ramshorn\" headache pain. Recommend d/c after next visit if he continues ot deny cervicogenic headache. He does still have c/o of mid line parietal headache that is controlled with advil. HE was educated to consult with MD if he requires advil for more than 1 more week tro control symptoms. · Pain/ Symptoms: Initial:   0/10 \"I have a global headache. I am taking advil and it helps. \" Post Session:  0/10 ·   Compliance with Program/Exercises: Will assess as treatment progresses. · Recommendations/Intent for next treatment session: \"Next visit will focus on advancements to more challenging activities\".   Total Treatment Duration:  PT Patient Time In/Time Out  Time In: 1145  Time Out: [address]

## 2017-08-09 ENCOUNTER — HOSPITAL ENCOUNTER (OUTPATIENT)
Dept: PHYSICAL THERAPY | Age: 72
Discharge: HOME | End: 2017-08-09
Payer: MEDICARE

## 2017-08-09 PROCEDURE — 97110 THERAPEUTIC EXERCISES: CPT

## 2017-08-09 PROCEDURE — 97140 MANUAL THERAPY 1/> REGIONS: CPT

## 2017-08-09 NOTE — PROGRESS NOTES
Regulo Trinidad. : 1945 [address] [address], [place].  [phone]   [phone]        OUTPATIENT PHYSICAL THERAPY:Daily Note 2017        ICD-10: Treatment Diagnosis: Headache (R51)Cervicalgia (M54.2)  Precautions/Allergies: Other medication and Red wine extract   Fall Risk Score: 1 (? 5 = High Risk)  MD Orders: Evaluation and treatment MEDICAL/REFERRING DIAGNOSIS:  Neck pain [M54.2]   DATE OF ONSET: 6/10/17 when he started new medications to treat memory loss and ADD  REFERRING PHYSICIAN: DO MABEL Gomez PHYSICIAN APPOINTMENT: TBD     INITIAL ASSESSMENT:  Mr. Khadra Cruz presents with headache pain, cervical spine facet joint dysfunction, and upper trapezius myofascial pain. His symptoms are consistent with cervicogenic headache. Recommend PT services to remeidate impairments. PROBLEM LIST (Impacting functional limitations):  1. Decreased Strength  2. Decreased ADL/Functional Activities  3. Increased Pain  4. Decreased Flexibility/Joint Mobility INTERVENTIONS PLANNED:  1. Cold  2. Cryotherapy  3. Electrical Stimulation  4. Heat  5. Home Exercise Program (HEP)  6. Manual Therapy  7. Neuromuscular Re-education/Strengthening  8. Range of Motion (ROM)  9. Therapeutic Activites  10. Therapeutic Exercise/Strengthening  11. Ultrasound (US)   TREATMENT PLAN:  Effective Dates: 17 TO 10/23/17. Frequency/Duration: 2 times a week for 12 weeks  GOALS: (Goals have been discussed and agreed upon with patient.)  Short-Term Functional Goals: Time Frame: 2 weeks  1. Patient will be independent with HEP without pain.(MET)  2. Patient will report headache frequency to less than 4 our of 7 days a week allowing him improved concentration.(progressing)   3. Patient will have improve upright posture, reducing symptoms of headache pain. (MET)  Discharge Goals: Time Frame: 12 weeks  1.  Patient will have NDI score improved to < 10 points allowing him improve community participation. 2. Patient will have improved C/S AROM to 80 degrees of bilateral neck rotation allowing him to look over his shoulder.(progressing)  3. Patient will report headache frequency to < 1/7 days a week allowing him to return to PLOF. Rehabilitation Potential For Stated Goals: Good                HISTORY:   History of Present Injury/Illness (Reason for Referral):  75 y/o M with c/o of upper neck and headache pain. He is familiar to clinic and had PT treatment for right sided neck and anterior shoulder pain. He had good success with PT and his symptoms were re-mediated within 4 sessions. He has chronic c/o of headache that comes and goes. His headache recently increased when he was being treated with medication with for memory loss and ADH. He is now off of the medications but he has posterior head headaches that radiate to B eyes/temporal area and constantly hurting on the top of the head. He reports MRI diagnosis of possible hydrocephalus but his neurologist Dr. Catarino Villaseñor ruled out hydrocephalus. He also has recent onset vertigo, right ear pain, and hearing loss of right ear with weird noises. This is currently gone. Past Medical History/Comorbidities:   Mr. Lauren Adler  has a past medical history of ADD (attention deficit disorder); Anemia, unspecified; Aneurysm of unspecified site [facility]); Anxiety; Arthritis; Attention deficit disorder with hyperactivity; Benign paroxysmal vertigo; BPH (benign prostatic hypertrophy); Chronic prostatitis (11/15/2013); Colitis, enteritis, and gastroenteritis of presumed infectious origin; Depressive disorder, not elsewhere classified; Disorders of bursae and tendons in shoulder region, unspecified; Fatty liver; GERD (gastroesophageal reflux disease); History of tetanus, diphtheria, and acellular pertussis booster vaccination (Tdap); Hypercholesteremia;  Hypertrophy of prostate with urinary obstruction and other lower urinary tract symptoms (LUTS) (11/15/2013); Impotence of organic origin; Inguinal hernia (2014); Insomnia, unspecified; Medial epicondylitis of elbow; Nonspecific elevation of levels of transaminase or lactic acid dehydrogenase (LDH); Orthostatic hypotension; Osteoporosis (11/15/2013); Other and unspecified hyperlipidemia; Tremor; Type II or unspecified type diabetes mellitus without mention of complication, not stated as uncontrolled; and Vertigo. Mr. Tramaine Barreto  has a past surgical history that includes orthopaedic (2010); heent; mohs procedure (Right); vasectomy; urological; urological; turp (2317,3112); knee arthroscopy (Right, 2000); lumbar laminectomy (2/2012); shoulder arthroscopy (Right, 1995); tonsillectomy; cystoscopy,insert ureteral stent; and hernia repair (Bilateral, 2/3/14). Social History/Living Environment:     Lives with spouse  Prior Level of Function/Work/Activity:  Independent  Previous Treatment Approaches:          PT for neck and shoulder pain that remediated symptoms, CTSCAN, ENT, neurologist have ruled out vascular or neuro cause of pain    Current Medications:    Current Outpatient Prescriptions:     memantine (NAMENDA) 10 mg tablet, Take 1 Tab by mouth two (2) times a day., Disp: 60 Tab, Rfl: 9    multivitamin (ONE A DAY) tablet, Take 1 Tab by mouth daily. , Disp: , Rfl:     B.infantis-B.ani-B.long-B.bifi (PROBIOTIC 4X) 10-15 mg TbEC, Take  by mouth., Disp: , Rfl:     cholic acid 065 mg cap, Take 500 mg by mouth., Disp: , Rfl:     turmeric root extract 500 mg cap, 1 per mouth daily, Disp: , Rfl:     hyoscyamine (ANASPAZ, LEVSIN) 0.125 mg tablet, TK 1 T PO Q 4 H PRF ABDOMINAL PAIN OR CRAMPING, Disp: , Rfl: 3    propranolol (INDERAL) 10 mg tablet, TK 1 T PO BID FOR JITTERNESS, Disp: , Rfl: 0    clonazePAM (KLONOPIN) 1 mg tablet, 0.5 mg., Disp: , Rfl: 2    omeprazole (PRILOSEC) 40 mg capsule, , Disp: , Rfl: 4    CHOLINE PO, Take  by mouth., Disp: , Rfl:     cpap machine kit, by Does Not Apply route. autopap 5-15 cm, Disp: , Rfl:     triamterene-hydroCHLOROthiazide (DYAZIDE) 37.5-25 mg per capsule, Take  by mouth daily. , Disp: , Rfl:     potassium chloride SR (KLOR-CON 10) 10 mEq tablet, Take  by mouth., Disp: , Rfl:     magnesium gluconate 500 mg (27 mg  elemental) tablet, Take  by mouth two (2) times a day., Disp: , Rfl:     b complex vitamins tablet, Take  by mouth., Disp: , Rfl:     arginine 500 mg tablet, Take 500 mg by mouth., Disp: , Rfl:     omega-3 fatty acids-vitamin e (FISH OIL) 1,000 mg cap, Take 1 Cap by mouth., Disp: , Rfl:     simvastatin (ZOCOR) 20 mg tablet, Take 10 mg by mouth nightly., Disp: , Rfl:     co-enzyme Q-10 (CO Q-10) 100 mg capsule, Take 100 mg by mouth daily. , Disp: , Rfl:     Cholecalciferol, Vitamin D3, (VITAMIN D) 1,000 unit Cap, Take  by mouth daily. , Disp: , Rfl:     ASPIRIN 81 mg Tab, Take 2 Tabs by mouth daily. , Disp: , Rfl:    Date Last Reviewed:  8/9/2017   EXAMINATION:   Observation/Orthostatic Postural Assessment: Forward head posture  Palpation:          Increased tone a pain B UT L>R, suboccipitals WNL, C/S lateral glides C3-6 hypomobile with pain  ROM:     C/S AROM  Left rotation 45  Right rotation 40 deg with upper neck pain    PROM CRLF WNL B   8/7/17  AROM C/S  RR 65 deg   LR 70 deg   Strength:     WNL      Special Tests:          Transverse ligament negative,  ULNT negative, Spurlings negative  Neurological Screen:        Sensation: light touch intact B UE  Functional Mobility:         Gait/Ambulation:  WNL  Balance:          SLB poor B   Body Structures Involved:  1. Joints  2. Muscles Body Functions Affected:  1. Neuromusculoskeletal  2. Movement Related Activities and Participation Affected:  1. General Tasks and Demands  2. Domestic Life  3. Interpersonal Interactions and Relationships  4. Community, Social and Civic Life   CLINICAL PRESENTATION:   CLINICAL DECISION MAKING:   Outcome Measure:    Tool Used: Neck Disability Index (NDI)  Score:  Initial: 22/50  Most Recent: X/50 (Date: -- )   Interpretation of Score: The Neck Disability Index is a revised form of the Oswestry Low Back Pain Index and is designed to measure the activities of daily living in person's with neck pain. Each section is scored on a 0-5 scale, 5 representing the greatest disability. The scores of each section are added together for a total score of 50. Score 0 1-10 11-20 21-30 31-40 41-49 50   Modifier CH CI CJ CK CL CM CN     ? Changing and Maintaining Body Position:    [id number] - CURRENT STATUS: CK - 40%-59% impaired, limited or restricted    - GOAL STATUS: CI - 1%-19% impaired, limited or restricted    - D/C STATUS:  ---------------To be determined---------------      Medical Necessity:   · Patient is expected to demonstrate progress in strength, range of motion, balance and coordination to increase independence with looking over his shoulder. Reason for Services/Other Comments:  · Patient has been observed to have decrease range of motion  before, during or after an intervention. TREATMENT:   (In addition to Assessment/Re-Assessment sessions the following treatments were rendered)THERAPEUTIC EXERCISE: (see grid for minutes):  Exercises per grid below to improve mobility, strength, balance and coordination. Required moderate visual, verbal, manual and tactile cues to promote proper body alignment, promote proper body posture and promote proper body mechanics. Progressed resistance, range and repetitions as indicated. MANUAL THERAPY: (see grid for minutes): Joint mobilization and Soft tissue mobilization was utilized and necessary because of the patient's restricted joint motion, painful spasm and loss of articular motion. MODALITIES: (see grid for minutes): *  Electrical Stimulation Therapy (IFC) was provided with intensity adjusted throughout treatment to patient tolerance.  to reduce pain       *  Cold Pack Therapy in order to provide analgesia and reduce inflammation and edema. *  Hot Pack Therapy in order to relieve muscle spasm. Date: 7/27/17 7/31/17 8/2/17 8/7/17  (visit 4) 8/9/17  (visit 5)    Modalities:                                Therapeutic Exercise: 13 mins 15 min 15 mins 25 mins 25 min   Chin tuck 10x 10x supine, 5x at 45 deg rot 10x each  10x 10x each, tuck with lift 5\"x5, 45 deg rotation with lift 5x5\" B   C/S AROM rotation 10x 10x supine 10x 10x 10x   Scapula retraction and depression  10x10\" 10x10\" TB rows silver and Extension dark blue with scapula [id number] each 3x10 each silver band 3x10   C/S ball on wall   Retractions 10x5\", Rot 10x B 10x5\" retraction 10x B Rot 10x5\", 10x rot 10x   Quad SA punches, cat/cow, C/S extension   10x each 10x 10x   UBE    2' each direction L4 3' each direction L4   Proprioceptive Activities:                                Manual Therapy: 10 mins 30 min 30 mins 15 min 15 min   FDN/FMR B UT Supine with C/S lateral glides gr 3/4 Prone FDN B SO and UT, supine STM B UTs and SOR, R 1st rib MET Repeat MFD B UT, scalenes, SOR, PROM into rotation Lateral glides upper C/S L and R, overpressure PROM rotation, STM B UT and SOR           Therapeutic Activities:                                        HEP: HEP handout provided 7/27/17  Treatment/Session Assessment:  He had upper C/S lateral glides restriction L to R and lifting C/S AROM in all directions with right sided neck pain. Her presented with decreased upper C/S PROM right rotation. He responded well to manual therapy with improved AROM prior to pain onset after session. Con't with POC. · Pain/ Symptoms: Initial:   0/10 \"My neck hurts on the right side. My headache has been good except when I bend forward to pick something up from the ground a get a headache behind the right eye. \" Post Session:  0/10 ·   Compliance with Program/Exercises: Will assess as treatment progresses.   · Recommendations/Intent for next treatment session: \"Next visit will focus on advancements to more challenging activities\".   Total Treatment Duration:  PT Patient Time In/Time Out  Time In: 1145  Time Out: [address]

## 2017-08-13 NOTE — PROGRESS NOTES
Riki Perez. : 1945 [id number] [facility]  [address] [place] [address].  [phone]   [phone]        OUTPATIENT PHYSICAL THERAPY:Daily Note 2017        ICD-10: Treatment Diagnosis: Headache (R51)Cervicalgia (M54.2)  Precautions/Allergies: Other medication and Red wine extract   Fall Risk Score: 1 (? 5 = High Risk)  MD Orders: Evaluation and treatment MEDICAL/REFERRING DIAGNOSIS:  Neck pain [M54.2]   DATE OF ONSET: 6/10/17 when he started new medications to treat memory loss and ADD  REFERRING PHYSICIAN: Mary Louise DO  RETURN PHYSICIAN APPOINTMENT: TBD     INITIAL ASSESSMENT:  Mr. Zeynep Carreon presents with headache pain, cervical spine facet joint dysfunction, and upper trapezius myofascial pain. His symptoms are consistent with cervicogenic headache. Recommend PT services to remeidate impairments. PROBLEM LIST (Impacting functional limitations):  1. Decreased Strength  2. Decreased ADL/Functional Activities  3. Increased Pain  4. Decreased Flexibility/Joint Mobility INTERVENTIONS PLANNED:  1. Cold  2. Cryotherapy  3. Electrical Stimulation  4. Heat  5. Home Exercise Program (HEP)  6. Manual Therapy  7. Neuromuscular Re-education/Strengthening  8. Range of Motion (ROM)  9. Therapeutic Activites  10. Therapeutic Exercise/Strengthening  11. Ultrasound (US)   TREATMENT PLAN:  Effective Dates: 17 TO 10/23/17. Frequency/Duration: 2 times a week for 12 weeks  GOALS: (Goals have been discussed and agreed upon with patient.)  Short-Term Functional Goals: Time Frame: 2 weeks  1. Patient will be independent with HEP without pain.(MET)  2. Patient will report headache frequency to less than 4 our of 7 days a week allowing him improved concentration.(progressing)   3. Patient will have improve upright posture, reducing symptoms of headache pain. (MET)  Discharge Goals: Time Frame: 12 weeks  1.  Patient will have NDI score improved to < 10 points allowing him improve community participation. 2. Patient will have improved C/S AROM to 80 degrees of bilateral neck rotation allowing him to look over his shoulder.(progressing)  3. Patient will report headache frequency to < 1/7 days a week allowing him to return to PLOF. Rehabilitation Potential For Stated Goals: Good                HISTORY:   History of Present Injury/Illness (Reason for Referral):  75 y/o M with c/o of upper neck and headache pain. He is familiar to clinic and had PT treatment for right sided neck and anterior shoulder pain. He had good success with PT and his symptoms were re-mediated within 4 sessions. He has chronic c/o of headache that comes and goes. His headache recently increased when he was being treated with medication with for memory loss and ADH. He is now off of the medications but he has posterior head headaches that radiate to B eyes/temporal area and constantly hurting on the top of the head. He reports MRI diagnosis of possible hydrocephalus but his neurologist Dr. Pauline Branch ruled out hydrocephalus. He also has recent onset vertigo, right ear pain, and hearing loss of right ear with weird noises. This is currently gone. Past Medical History/Comorbidities:   Mr. Elder Chavez  has a past medical history of ADD (attention deficit disorder); Anemia, unspecified; Aneurysm of unspecified site [facility]); Anxiety; Arthritis; Attention deficit disorder with hyperactivity; Benign paroxysmal vertigo; BPH (benign prostatic hypertrophy); Chronic prostatitis (11/15/2013); Colitis, enteritis, and gastroenteritis of presumed infectious origin; Depressive disorder, not elsewhere classified; Disorders of bursae and tendons in shoulder region, unspecified; Fatty liver; GERD (gastroesophageal reflux disease); History of tetanus, diphtheria, and acellular pertussis booster vaccination (Tdap); Hypercholesteremia;  Hypertrophy of prostate with urinary obstruction and other lower urinary tract symptoms (LUTS) (11/15/2013); Impotence of organic origin; Inguinal hernia (2014); Insomnia, unspecified; Medial epicondylitis of elbow; Nonspecific elevation of levels of transaminase or lactic acid dehydrogenase (LDH); Orthostatic hypotension; Osteoporosis (11/15/2013); Other and unspecified hyperlipidemia; Tremor; Type II or unspecified type diabetes mellitus without mention of complication, not stated as uncontrolled; and Vertigo. Mr. Dot Del Rio  has a past surgical history that includes orthopaedic (2010); heent; mohs procedure (Right); vasectomy; urological; urological; turp (9220,1079); knee arthroscopy (Right, 2000); lumbar laminectomy (2/2012); shoulder arthroscopy (Right, 1995); tonsillectomy; cystoscopy,insert ureteral stent; and hernia repair (Bilateral, 2/3/14). Social History/Living Environment:     Lives with spouse  Prior Level of Function/Work/Activity:  Independent  Previous Treatment Approaches:          PT for neck and shoulder pain that remediated symptoms, CTSCAN, ENT, neurologist have ruled out vascular or neuro cause of pain    Current Medications:    Current Outpatient Prescriptions:     memantine (NAMENDA) 10 mg tablet, Take 1 Tab by mouth two (2) times a day., Disp: 60 Tab, Rfl: 9    multivitamin (ONE A DAY) tablet, Take 1 Tab by mouth daily. , Disp: , Rfl:     B.infantis-B.ani-B.long-B.bifi (PROBIOTIC 4X) 10-15 mg TbEC, Take  by mouth., Disp: , Rfl:     cholic acid 451 mg cap, Take 500 mg by mouth., Disp: , Rfl:     turmeric root extract 500 mg cap, 1 per mouth daily, Disp: , Rfl:     hyoscyamine (ANASPAZ, LEVSIN) 0.125 mg tablet, TK 1 T PO Q 4 H PRF ABDOMINAL PAIN OR CRAMPING, Disp: , Rfl: 3    propranolol (INDERAL) 10 mg tablet, TK 1 T PO BID FOR JITTERNESS, Disp: , Rfl: 0    clonazePAM (KLONOPIN) 1 mg tablet, 0.5 mg., Disp: , Rfl: 2    omeprazole (PRILOSEC) 40 mg capsule, , Disp: , Rfl: 4    CHOLINE PO, Take  by mouth., Disp: , Rfl:     cpap machine kit, by Does Not Apply route. autopap 5-15 cm, Disp: , Rfl:     triamterene-hydroCHLOROthiazide (DYAZIDE) 37.5-25 mg per capsule, Take  by mouth daily. , Disp: , Rfl:     potassium chloride SR (KLOR-CON 10) 10 mEq tablet, Take  by mouth., Disp: , Rfl:     magnesium gluconate 500 mg (27 mg  elemental) tablet, Take  by mouth two (2) times a day., Disp: , Rfl:     b complex vitamins tablet, Take  by mouth., Disp: , Rfl:     arginine 500 mg tablet, Take 500 mg by mouth., Disp: , Rfl:     omega-3 fatty acids-vitamin e (FISH OIL) 1,000 mg cap, Take 1 Cap by mouth., Disp: , Rfl:     simvastatin (ZOCOR) 20 mg tablet, Take 10 mg by mouth nightly., Disp: , Rfl:     co-enzyme Q-10 (CO Q-10) 100 mg capsule, Take 100 mg by mouth daily. , Disp: , Rfl:     Cholecalciferol, Vitamin D3, (VITAMIN D) 1,000 unit Cap, Take  by mouth daily. , Disp: , Rfl:     ASPIRIN 81 mg Tab, Take 2 Tabs by mouth daily. , Disp: , Rfl:    Date Last Reviewed:  8/14/2017   EXAMINATION:   Observation/Orthostatic Postural Assessment: Forward head posture  Palpation:          Increased tone a pain B UT L>R, suboccipitals WNL, C/S lateral glides C3-6 hypomobile with pain  ROM:     C/S AROM  Left rotation 45  Right rotation 40 deg with upper neck pain    PROM CRLF WNL B   8/7/17  AROM C/S  RR 65 deg   LR 70 deg   Strength:     WNL      Special Tests:          Transverse ligament negative,  ULNT negative, Spurlings negative  Neurological Screen:        Sensation: light touch intact B UE  Functional Mobility:         Gait/Ambulation:  WNL  Balance:          SLB poor B   Body Structures Involved:  1. Joints  2. Muscles Body Functions Affected:  1. Neuromusculoskeletal  2. Movement Related Activities and Participation Affected:  1. General Tasks and Demands  2. Domestic Life  3. Interpersonal Interactions and Relationships  4. Community, Social and Civic Life   CLINICAL PRESENTATION:   CLINICAL DECISION MAKING:   Outcome Measure:    Tool Used: Neck Disability Index (NDI)  Score:  Initial: 22/50  Most Recent: X/50 (Date: -- )   Interpretation of Score: The Neck Disability Index is a revised form of the Oswestry Low Back Pain Index and is designed to measure the activities of daily living in person's with neck pain. Each section is scored on a 0-5 scale, 5 representing the greatest disability. The scores of each section are added together for a total score of 50. Score 0 1-10 11-20 21-30 31-40 41-49 50   Modifier CH CI CJ CK CL CM CN     ? Changing and Maintaining Body Position:    [id number] - CURRENT STATUS: CK - 40%-59% impaired, limited or restricted    - GOAL STATUS: CI - 1%-19% impaired, limited or restricted    - D/C STATUS:  ---------------To be determined---------------      Medical Necessity:   · Patient is expected to demonstrate progress in strength, range of motion, balance and coordination to increase independence with looking over his shoulder. Reason for Services/Other Comments:  · Patient has been observed to have decrease range of motion  before, during or after an intervention. TREATMENT:   (In addition to Assessment/Re-Assessment sessions the following treatments were rendered)THERAPEUTIC EXERCISE: (see grid for minutes):  Exercises per grid below to improve mobility, strength, balance and coordination. Required moderate visual, verbal, manual and tactile cues to promote proper body alignment, promote proper body posture and promote proper body mechanics. Progressed resistance, range and repetitions as indicated. MANUAL THERAPY: (see grid for minutes): Joint mobilization and Soft tissue mobilization was utilized and necessary because of the patient's restricted joint motion, painful spasm and loss of articular motion. MODALITIES: (see grid for minutes): *  Electrical Stimulation Therapy (IFC) was provided with intensity adjusted throughout treatment to patient tolerance.  to reduce pain       *  Cold Pack Therapy in order to provide analgesia and reduce inflammation and edema. *  Hot Pack Therapy in order to relieve muscle spasm. Date: 7/27/17 7/31/17 8/2/17 8/7/17  (visit 4) 8/9/17  (visit 5)  8/14/17  (visit 6)   Modalities:                                    Therapeutic Exercise: 13 mins 15 min 15 mins 25 mins 25 min 30 mins   Chin tuck 10x 10x supine, 5x at 45 deg rot 10x each  10x 10x each, tuck with lift 5\"x5, 45 deg rotation with lift 5x5\" B 10x5\" tuck, 5x5\" at 45 deg rot,    C/S AROM rotation 10x 10x supine 10x 10x 10x 10x   Scapula retraction and depression  10x10\" 10x10\" TB rows silver and Extension dark blue with scapula [id number] each 3x10 each silver band 3x10 Cable 5 pl 3x12   C/S ball on wall   Retractions 10x5\", Rot 10x B 10x5\" retraction 10x B Rot 10x5\", 10x rot 10x 10x each   Quad SA punches, cat/cow, C/S extension   10x each 10x 10x 10x each   UBE    2' each direction L4 3' each direction L4 2' each direciotn L5   Proprioceptive Activities:                                    Manual Therapy: 10 mins 30 min 30 mins 15 min 15 min 10 mins   FDN/FMR B UT Supine with C/S lateral glides gr 3/4 Prone FDN B SO and UT, supine STM B UTs and SOR, R 1st rib MET Repeat MFD B UT, scalenes, SOR, PROM into rotation Lateral glides upper C/S L and R, overpressure PROM rotation, STM B UT and SOR repeat            Therapeutic Activities:                                             HEP: HEP handout provided 7/27/17  Treatment/Session Assessment:  He has improved  Body mechanics, range, and posture with exercise. Con't with POC. · Pain/ Symptoms: Initial:   0/10 \"I feel better. I have a mild headache if I bend forward so I am not going to bend forward anymore. I have neck pain sometimes but no pain now\" Post Session:  0/10 ·   Compliance with Program/Exercises: Will assess as treatment progresses. · Recommendations/Intent for next treatment session: \"Next visit will focus on advancements to more challenging activities\".   Total Treatment Duration:  PT Patient Time In/Time Out  Time In: 5902  Time Out: [address],

## 2017-08-14 ENCOUNTER — HOSPITAL ENCOUNTER (OUTPATIENT)
Dept: PHYSICAL THERAPY | Age: 72
Discharge: HOME | End: 2017-08-14
Payer: MEDICARE

## 2017-08-14 PROCEDURE — 97110 THERAPEUTIC EXERCISES: CPT

## 2017-08-14 PROCEDURE — 97140 MANUAL THERAPY 1/> REGIONS: CPT

## 2017-08-15 NOTE — PROGRESS NOTES
Naga Her. : 1945 [id number] [facility],[address]  [facility] [address]  [phone]   [phone]        OUTPATIENT PHYSICAL THERAPY:Daily Note and Progress Report 2017        ICD-10: Treatment Diagnosis: Headache (R51)Cervicalgia (M54.2)  Precautions/Allergies: Other medication and Red wine extract   Fall Risk Score: 1 (? 5 = High Risk)  MD Orders: Evaluation and treatment MEDICAL/REFERRING DIAGNOSIS:  Neck pain [M54.2]   DATE OF ONSET: 6/10/17 when he started new medications to treat memory loss and ADD  REFERRING PHYSICIAN: DO MABEL Jaimes PHYSICIAN APPOINTMENT: TBD     INITIAL ASSESSMENT:  Mr. Montserrat Barnes presents with headache pain, cervical spine facet joint dysfunction, and upper trapezius myofascial pain. His symptoms are consistent with cervicogenic headache. Recommend PT services to remeidate impairments. 17 Progress note: Mr. Montserrat Barnes has no reproduction of symptoms at therapy. He has improved C/S AROM and strength. He has subjective c/o of headache with coughing, bending his body forward, and wearing a hat. His cervicogenic headache symptoms are resolved but he continues to have symptoms midline parietal lobe that are not consistent. Recommend MD consult regarding symptom provocation to rule out other cause of headache pain   PROBLEM LIST (Impacting functional limitations):  1. Decreased Strength  2. Decreased ADL/Functional Activities  3. Increased Pain  4. Decreased Flexibility/Joint Mobility INTERVENTIONS PLANNED:  1. Cold  2. Cryotherapy  3. Electrical Stimulation  4. Heat  5. Home Exercise Program (HEP)  6. Manual Therapy  7. Neuromuscular Re-education/Strengthening  8. Range of Motion (ROM)  9. Therapeutic Activites  10. Therapeutic Exercise/Strengthening  11. Ultrasound (US)   TREATMENT PLAN:  Effective Dates: 17 TO 10/23/17.   Frequency/Duration: 2 times a week for 12 weeks  GOALS: (Goals have been discussed and agreed upon with patient.)  Short-Term Functional Goals: Time Frame: 2 weeks  1. Patient will be independent with HEP without pain.(MET)  2. Patient will report headache frequency to less than 4 our of 7 days a week allowing him improved concentration. MET)   3. Patient will have improve upright posture, reducing symptoms of headache pain. (MET)  Discharge Goals: Time Frame: 12 weeks  1. Patient will have NDI score improved to < 10 points allowing him improve community participation.(MET)  2. Patient will have improved C/S AROM to 80 degrees of bilateral neck rotation allowing him to look over his shoulder.(MET)  3. Patient will report headache frequency to < 1/7 days a week allowing him to return to PLOF. (MET)  Rehabilitation Potential For Stated Goals: Good    Regarding Josué Tyler Jr.'s therapy, I certify that the treatment plan above will be carried out by a therapist or under their direction. Thank you for this referral,  David Talamantes, PT     Referring Physician Signature: Calvin Don, DO          Date                          HISTORY:   History of Present Injury/Illness (Reason for Referral):  75 y/o M with c/o of upper neck and headache pain. He is familiar to clinic and had PT treatment for right sided neck and anterior shoulder pain. He had good success with PT and his symptoms were re-mediated within 4 sessions. He has chronic c/o of headache that comes and goes. His headache recently increased when he was being treated with medication with for memory loss and ADH. He is now off of the medications but he has posterior head headaches that radiate to B eyes/temporal area and constantly hurting on the top of the head. He reports MRI diagnosis of possible hydrocephalus but his neurologist Dr. Molly Ta ruled out hydrocephalus. He also has recent onset vertigo, right ear pain, and hearing loss of right ear with weird noises. This is currently gone.       Past Medical History/Comorbidities:   Mr. Jose Angel Marie  has a past medical history of ADD (attention deficit disorder); Anemia, unspecified; Aneurysm of unspecified site [facility]); Anxiety; Arthritis; Attention deficit disorder with hyperactivity; Benign paroxysmal vertigo; BPH (benign prostatic hypertrophy); Chronic prostatitis (11/15/2013); Colitis, enteritis, and gastroenteritis of presumed infectious origin; Depressive disorder, not elsewhere classified; Disorders of bursae and tendons in shoulder region, unspecified; Fatty liver; GERD (gastroesophageal reflux disease); History of tetanus, diphtheria, and acellular pertussis booster vaccination (Tdap); Hypercholesteremia; Hypertrophy of prostate with urinary obstruction and other lower urinary tract symptoms (LUTS) (11/15/2013); Impotence of organic origin; Inguinal hernia (2014); Insomnia, unspecified; Medial epicondylitis of elbow; Nonspecific elevation of levels of transaminase or lactic acid dehydrogenase (LDH); Orthostatic hypotension; Osteoporosis (11/15/2013); Other and unspecified hyperlipidemia; Tremor; Type II or unspecified type diabetes mellitus without mention of complication, not stated as uncontrolled; and Vertigo. Mr. Jose Angel Marie  has a past surgical history that includes orthopaedic (2010); heent; mohs procedure (Right); vasectomy; urological; urological; turp (1334,8876); knee arthroscopy (Right, 2000); lumbar laminectomy (2/2012); shoulder arthroscopy (Right, 1995); tonsillectomy; cystoscopy,insert ureteral stent; and hernia repair (Bilateral, 2/3/14). Social History/Living Environment:     Lives with spouse  Prior Level of Function/Work/Activity:  Independent  Previous Treatment Approaches:          PT for neck and shoulder pain that remediated symptoms, CTSCAN, ENT, neurologist have ruled out vascular or neuro cause of pain    Current Medications:    Current Outpatient Prescriptions:     memantine (NAMENDA) 10 mg tablet, Take 1 Tab by mouth two (2) times a day. , Disp: 60 Tab, Rfl: 9    multivitamin (ONE A DAY) tablet, Take 1 Tab by mouth daily. , Disp: , Rfl:     B.infantis-B.ani-B.long-B.bifi (PROBIOTIC 4X) 10-15 mg TbEC, Take  by mouth., Disp: , Rfl:     cholic acid 881 mg cap, Take 500 mg by mouth., Disp: , Rfl:     turmeric root extract 500 mg cap, 1 per mouth daily, Disp: , Rfl:     hyoscyamine (ANASPAZ, LEVSIN) 0.125 mg tablet, TK 1 T PO Q 4 H PRF ABDOMINAL PAIN OR CRAMPING, Disp: , Rfl: 3    propranolol (INDERAL) 10 mg tablet, TK 1 T PO BID FOR JITTERNESS, Disp: , Rfl: 0    clonazePAM (KLONOPIN) 1 mg tablet, 0.5 mg., Disp: , Rfl: 2    omeprazole (PRILOSEC) 40 mg capsule, , Disp: , Rfl: 4    CHOLINE PO, Take  by mouth., Disp: , Rfl:     cpap machine kit, by Does Not Apply route. autopap 5-15 cm, Disp: , Rfl:     triamterene-hydroCHLOROthiazide (DYAZIDE) 37.5-25 mg per capsule, Take  by mouth daily. , Disp: , Rfl:     potassium chloride SR (KLOR-CON 10) 10 mEq tablet, Take  by mouth., Disp: , Rfl:     magnesium gluconate 500 mg (27 mg  elemental) tablet, Take  by mouth two (2) times a day., Disp: , Rfl:     b complex vitamins tablet, Take  by mouth., Disp: , Rfl:     arginine 500 mg tablet, Take 500 mg by mouth., Disp: , Rfl:     omega-3 fatty acids-vitamin e (FISH OIL) 1,000 mg cap, Take 1 Cap by mouth., Disp: , Rfl:     simvastatin (ZOCOR) 20 mg tablet, Take 10 mg by mouth nightly., Disp: , Rfl:     co-enzyme Q-10 (CO Q-10) 100 mg capsule, Take 100 mg by mouth daily. , Disp: , Rfl:     Cholecalciferol, Vitamin D3, (VITAMIN D) 1,000 unit Cap, Take  by mouth daily. , Disp: , Rfl:     ASPIRIN 81 mg Tab, Take 2 Tabs by mouth daily. , Disp: , Rfl:    Date Last Reviewed:  8/16/2017   EXAMINATION:   Observation/Orthostatic Postural Assessment:           Forward head posture  Palpation:          Increased tone a pain B UT L>R, suboccipitals WNL, C/S lateral glides C3-6 hypomobile with pain  ROM:     C/S AROM  Left rotation 45  Right rotation 40 deg with upper neck pain    PROM CRLF WNL B   8/7/17  AROM C/S  RR 65 deg   LR 70 deg   Strength:     WNL      Special Tests:          Transverse ligament negative,  ULNT negative, Spurlings negative  Neurological Screen:        Sensation: light touch intact B UE  Functional Mobility:         Gait/Ambulation:  WNL  Balance:          SLB poor B   Body Structures Involved:  1. Joints  2. Muscles Body Functions Affected:  1. Neuromusculoskeletal  2. Movement Related Activities and Participation Affected:  1. General Tasks and Demands  2. Domestic Life  3. Interpersonal Interactions and Relationships  4. Community, Social and Civic Life   CLINICAL PRESENTATION:   CLINICAL DECISION MAKING:   Outcome Measure: Tool Used: Neck Disability Index (NDI)  Score:  Initial: 22/50  Most Recent: 6/50 (Date: 8/16/17 )   Interpretation of Score: The Neck Disability Index is a revised form of the Oswestry Low Back Pain Index and is designed to measure the activities of daily living in person's with neck pain. Each section is scored on a 0-5 scale, 5 representing the greatest disability. The scores of each section are added together for a total score of 50. Score 0 1-10 11-20 21-30 31-40 41-49 50   Modifier CH CI CJ CK CL CM CN     ? Changing and Maintaining Body Position:    [id number] - CURRENT STATUS: CI - 1%-19% impaired, limited or restricted    - GOAL STATUS: CI - 1%-19% impaired, limited or restricted    - D/C STATUS:  ---------------To be determined---------------      Medical Necessity:   · Patient is expected to demonstrate progress in strength, range of motion, balance and coordination to increase independence with looking over his shoulder. Reason for Services/Other Comments:  · Patient has been observed to have decrease range of motion  before, during or after an intervention.    TREATMENT:   (In addition to Assessment/Re-Assessment sessions the following treatments were rendered)THERAPEUTIC EXERCISE: (see grid for minutes): Exercises per grid below to improve mobility, strength, balance and coordination. Required moderate visual, verbal, manual and tactile cues to promote proper body alignment, promote proper body posture and promote proper body mechanics. Progressed resistance, range and repetitions as indicated. MANUAL THERAPY: (see grid for minutes): Joint mobilization and Soft tissue mobilization was utilized and necessary because of the patient's restricted joint motion, painful spasm and loss of articular motion. MODALITIES: (see grid for minutes): *  Electrical Stimulation Therapy (IFC) was provided with intensity adjusted throughout treatment to patient tolerance. to reduce pain       *  Cold Pack Therapy in order to provide analgesia and reduce inflammation and edema. *  Hot Pack Therapy in order to relieve muscle spasm.      Date: 7/27/17 7/31/17 8/2/17 8/7/17  (visit 4) 8/9/17  (visit 5)  8/14/17  (visit 6) 8/16/17  (visit 7)PN   Modalities:                                        Therapeutic Exercise: 13 mins 15 min 15 mins 25 mins 25 min 30 mins 15 mins   Chin tuck 10x 10x supine, 5x at 45 deg rot 10x each  10x 10x each, tuck with lift 5\"x5, 45 deg rotation with lift 5x5\" B 10x5\" tuck, 5x5\" at 45 deg rot,  10x each    C/S AROM rotation 10x 10x supine 10x 10x 10x 10x 10x   Scapula retraction and depression  10x10\" 10x10\" TB rows silver and Extension dark blue with scapula [id number] each 3x10 each silver band 3x10 Cable 5 pl 3x12 Black T 3x12   C/S ball on wall   Retractions 10x5\", Rot 10x B 10x5\" retraction 10x B Rot 10x5\", 10x rot 10x 10x each    Quad SA punches, cat/cow, C/S extension   10x each 10x 10x 10x each 10x each    UBE    2' each direction L4 3' each direction L4 2' each direciotn L5    Proprioceptive Activities:                                        Manual Therapy: 10 mins 30 min 30 mins 15 min 15 min 10 mins 30 mins   FDN/FMR B UT Supine with C/S lateral glides gr 3/4 Prone FDN B SO and UT, supine STM B UTs and SOR, R 1st rib MET Repeat MFD B UT, scalenes, SOR, PROM into rotation Lateral glides upper C/S L and R, overpressure PROM rotation, STM B UT and SOR repeat repeat             Therapeutic Activities:                                                  HEP: HEP handout provided 7/27/17  Treatment/Session Assessment:  He has no reproduction of symptoms at therapy. He has improved C/S AROM and strength. He has subjective c/o of headache with coughing, bending his body forward, and wearing a hat. His cervicogenic headache symptoms are resolved but he continues to have symptoms midline parietal lobe that are not consistent. Recommend MD consult regarding symptom provocation to rule out other cause of headache pain. · Pain/ Symptoms: Initial:   0/10 \"I feel better. I get a headache if I cough, bend forward, and wear a hat. \" 0/10 ·   Compliance with Program/Exercises: Compliant  · Recommendations/Intent for next treatment session: Hold of PT until he has MD winnie  Total Treatment Duration:  PT Patient Time In/Time Out  Time In: 1137  Time Out: [address], PT

## 2017-08-16 ENCOUNTER — HOSPITAL ENCOUNTER (OUTPATIENT)
Dept: PHYSICAL THERAPY | Age: 72
Discharge: HOME | End: 2017-08-16
Payer: MEDICARE

## 2017-08-16 PROCEDURE — G8981 BODY POS CURRENT STATUS: HCPCS

## 2017-08-16 PROCEDURE — 97110 THERAPEUTIC EXERCISES: CPT

## 2017-08-16 PROCEDURE — 97140 MANUAL THERAPY 1/> REGIONS: CPT

## 2017-08-16 PROCEDURE — G8982 BODY POS GOAL STATUS: HCPCS

## 2017-08-23 ENCOUNTER — APPOINTMENT (OUTPATIENT)
Dept: PHYSICAL THERAPY | Age: 72
End: 2017-08-23
Payer: MEDICARE

## 2017-08-23 ENCOUNTER — HOSPITAL ENCOUNTER (OUTPATIENT)
Dept: GENERAL RADIOLOGY | Age: 72
Discharge: HOME | End: 2017-08-23
Attending: FAMILY MEDICINE
Payer: MEDICARE

## 2017-08-23 DIAGNOSIS — I10 ESSENTIAL HYPERTENSION: ICD-10-CM

## 2017-08-23 DIAGNOSIS — R53.83 FATIGUE, UNSPECIFIED TYPE: ICD-10-CM

## 2017-08-23 PROCEDURE — 71020 XR CHEST PA LAT: CPT

## 2017-08-25 ENCOUNTER — APPOINTMENT (OUTPATIENT)
Dept: PHYSICAL THERAPY | Age: 72
End: 2017-08-25
Payer: MEDICARE

## 2017-09-21 NOTE — PROGRESS NOTES
Isaias Villanueva   [phone]) [phone] [facility],[address]  [address] [place] [address].  [phone]   [phone]           PHYSICIAN COMMUNICATION and discontinuation summary    REFERRING PHYSICIAN: Calvin Don DO  Return Physician Appointment: unknown   MEDICAL/REFERRING DIAGNOSIS:  · Neck pain [M54.2]  ATTENDANCE: Isaias Villanueva. has attended 7 out of 7 visits, with 0 cancellation(s) and 0 no shows. ASSESSMENT:  DATE: 2017    PROGRESS: Isaias Villanueva. made progress with PT services. He had c/o of less frequency neck and headache pain. He still continued to have headache pain if he bent his body forward or wore a hat. He was educated to f/u with referring physician regarding pain provocation. He has made maximum potential with PT services. His plan of care included there ex, manual therapy, dry needling, and HEP. RECOMMENDATIONS: Discharge to independent HEP.       Thank you for this referral,  David Talamantes, PT     Referring Physician Signature: Calvin Don DO          Date

## 2017-10-19 ENCOUNTER — HOSPITAL ENCOUNTER (OUTPATIENT)
Dept: GENERAL RADIOLOGY | Age: 72
Discharge: HOME | End: 2017-10-19
Attending: FAMILY MEDICINE
Payer: MEDICARE

## 2017-10-19 DIAGNOSIS — R05.9 COUGH: ICD-10-CM

## 2017-10-19 PROCEDURE — 71020 XR CHEST PA LAT: CPT

## 2017-11-29 ENCOUNTER — HOSPITAL ENCOUNTER (OUTPATIENT)
Dept: PHYSICAL THERAPY | Age: 72
Discharge: HOME | End: 2017-11-29
Payer: MEDICARE

## 2017-11-29 DIAGNOSIS — R51.9 NONINTRACTABLE HEADACHE, UNSPECIFIED CHRONICITY PATTERN, UNSPECIFIED HEADACHE TYPE: ICD-10-CM

## 2017-11-29 PROCEDURE — G8984 CARRY CURRENT STATUS: HCPCS

## 2017-11-29 PROCEDURE — 97161 PT EVAL LOW COMPLEX 20 MIN: CPT

## 2017-11-29 PROCEDURE — 97014 ELECTRIC STIMULATION THERAPY: CPT

## 2017-11-29 PROCEDURE — G8985 CARRY GOAL STATUS: HCPCS

## 2017-11-29 PROCEDURE — 97110 THERAPEUTIC EXERCISES: CPT

## 2017-11-29 NOTE — PROGRESS NOTES
Ambulatory/Rehab Services H2 Model Falls Risk Assessment    Risk Factor Pts. ·   Confusion/Disorientation/Impulsivity  []    4 ·   Symptomatic Depression  []   2 ·   Altered Elimination  []   1 ·   Dizziness/Vertigo  []   1 ·   Gender (Male)  [x]   1 ·   Any administered antiepileptics (anticonvulsants):  []   2 ·   Any administered benzodiazepines:  []   1 ·   Visual Impairment (specify):  []   1 ·   Portable Oxygen Use  []   1 ·   Orthostatic ? BP  []   1 ·   History of Recent Falls (within 3 mos.)  []   5     Ability to Rise from Chair (choose one) Pts. ·   Ability to rise in a single movement  []   0 ·   Pushes up, successful in one attempt  []   1 ·   Multiple attempts, but successful  []   3 ·   Unable to rise without assistance  []   4   Total: (5 or greater = High Risk) 1     Falls Prevention Plan:   []                Physical Limitations to Exercise (specify):   []                Mobility Assistance Device (type):   []                Exercise/Equipment Adaptation (specify):    ©2010 [facility] [address] Patent #[id number].  Federal Law prohibits the replication, distribution or use without written permission from [facility] [employer]

## 2017-11-29 NOTE — THERAPY EVALUATION
Tyler Po. : 1945 [address] [address].  [phone]   [phone]        OUTPATIENT PHYSICAL THERAPY:Initial Assessment 2017        ICD-10: Treatment Diagnosis: Cervicalgia (M54.2)  Precautions/Allergies: Other medication and Red wine extract   Fall Risk Score: 1 (? 5 = High Risk)  MD Orders: Evaluation and treatment MEDICAL/REFERRING DIAGNOSIS:  Nonintractable headache, unspecified chronicity pattern, unspecified headache type [R51]   DATE OF ONSET: Chronic   REFERRING PHYSICIAN: Martha Beckett MD  RETURN PHYSICIAN APPOINTMENT: TBD     INITIAL ASSESSMENT:  Mr. Chau Sommers presents with decreased C/S AROM, upper C/S facet joint restriction, and upper neck myofascial pain and restriction. His symptoms are consistent with cervicogenic headache and neck pain. He has no red flags. He will benefit from PT services to improve function and decrease pain. PROBLEM LIST (Impacting functional limitations):  1. Decreased Strength  2. Decreased ADL/Functional Activities  3. Increased Pain  4. Decreased Flexibility/Joint Mobility INTERVENTIONS PLANNED:  1. Cold  2. Cryotherapy  3. Electrical Stimulation  4. Heat  5. Home Exercise Program (HEP)  6. Manual Therapy  7. Neuromuscular Re-education/Strengthening  8. Range of Motion (ROM)  9. Therapeutic Activites  10. Therapeutic Exercise/Strengthening   TREATMENT PLAN:  Effective Dates: 17 TO 18. Frequency/Duration: 2 times a week for 12 weeks  GOALS: (Goals have been discussed and agreed upon with patient.)  Short-Term Functional Goals: Time Frame: 2 weeks  1. Patient will be independent with HEP without pain. 2. Patient will have improved posture to WNL decreasing pain when he is reading. Discharge Goals: Time Frame: 12 weeks  1.  Patient will have improved NDI score ot < 5 point of disability allowing him improved community participation. 2. Patient will have improved C/S AROM to > 70 deg without increased pain allowing him to look over his shoulder. 3. Patient will have improved C/S extension to > 75% of motion allowing him to look up without pan < 0/10. Rehabilitation Potential For Stated Goals: Good  Regarding Shiv Velásquez Jr.'s therapy, I certify that the treatment plan above will be carried out by a therapist or under their direction. Thank you for this referral,  Loren Allen, PT       Referring Physician Signature: Ra Cullen MD              Date                      HISTORY:   History of Present Injury/Illness (Reason for Referral):  75 y/o M with c/o of right sided neck and right posterior to anterior head pain. He reports MRI in June 2017 that diagnosed hydrocephalus. He is under the care of a neurologist that said his symptoms are not caused by hydrocephaslus  He reports 3/10 pain at his best but he reports his pain was a 7/10 1 month ago when he bent over. He is familiar to clinic and has has PT for neck pain and [address] earlier in the calender year of 2017. His symptoms of neck pain and ramshorn headache were remediated. He was d/c to his referring MD 2/2 to him continuing to have headache at the crown of his head that is only provoked with bending forward. He reported symptom relief with Advil. He reports fall off ladder > 3 months ago. Past Medical History/Comorbidities:   Mr. Boogie Helm  has a past medical history of ADD (attention deficit disorder); Anemia, unspecified; Aneurysm of unspecified site; Anxiety; Arthritis; Attention deficit disorder with hyperactivity(314.01); Benign paroxysmal vertigo; BPH (benign prostatic hypertrophy); Chronic prostatitis (11/15/2013); Colitis, enteritis, and gastroenteritis of presumed infectious origin; Depressive disorder, not elsewhere classified; Disorders of bursae and tendons in shoulder region, unspecified;  Fatty liver; GERD (gastroesophageal reflux disease); History of tetanus, diphtheria, and acellular pertussis booster vaccination (Tdap); Hypercholesteremia; Hypertrophy of prostate with urinary obstruction and other lower urinary tract symptoms (LUTS) (11/15/2013); Impotence of organic origin; Inguinal hernia (2014); Insomnia, unspecified; Medial epicondylitis of elbow; Nonspecific elevation of levels of transaminase or lactic acid dehydrogenase (LDH); Orthostatic hypotension; Osteoporosis (11/15/2013); Other and unspecified hyperlipidemia; Tremor; Type II or unspecified type diabetes mellitus without mention of complication, not stated as uncontrolled; and Vertigo. Mr. Hai Zavala  has a past surgical history that includes orthopaedic (2010); heent; mohs procedure (Right); vasectomy; urological; urological; turp ([id number],2722); knee arthroscopy (Right, 2000); lumbar laminectomy (2/2012); shoulder arthroscopy (Right, 1995); tonsillectomy; cystoscopy,insert ureteral stent; and hernia repair (Bilateral, 2/3/14). He reports 2 heart stents placed at the same time (/30/17. He does not have a pacemaker. Social History/Living Environment:    Lives with spouse  Prior Level of Function/Work/Activity:      Independent   Dominant Side:         LEFT    Current Medications:    Current Outpatient Prescriptions:     nitroglycerin (NITROSTAT) 0.4 mg SL tablet, 0.4 mg by SubLINGual route., Disp: , Rfl:     sertraline (ZOLOFT) 50 mg tablet, Take 75 mg by mouth., Disp: , Rfl:     DRYSOL 20 % external solution, U UTD, Disp: , Rfl: 4    clonazePAM (KLONOPIN) 1 mg tablet, , Disp: , Rfl: 2    dextroamphetamine-amphetamine (ADDERALL) 20 mg tablet, , Disp: , Rfl: 0    atorvastatin (LIPITOR) 80 mg tablet, Take 80 mg by mouth., Disp: , Rfl:     omeprazole (PRILOSEC) 20 mg capsule, TK 1 C PO BID 30 MIN B WILIAM AND DINNER, Disp: , Rfl: 3    BRILINTA 90 mg tablet, , Disp: , Rfl: 11    triamterene-hydroCHLOROthiazide (DYAZIDE) 37.5-25 mg per capsule, Take 1 Cap by mouth daily. , Disp: 30 Cap, Rfl: 5    multivitamin (ONE A DAY) tablet, Take 1 Tab by mouth daily. , Disp: , Rfl:     B.infantis-B.ani-B.long-B.bifi (PROBIOTIC 4X) 10-15 mg TbEC, Take  by mouth., Disp: , Rfl:     turmeric root extract 500 mg cap, 1 per mouth daily, Disp: , Rfl:     propranolol (INDERAL) 10 mg tablet, TK 1 T PO BID FOR JITTERNESS, Disp: , Rfl: 0    cpap machine kit, by Does Not Apply route. autopap 5-15 cm, Disp: , Rfl:     potassium chloride SR (KLOR-CON 10) 10 mEq tablet, Take  by mouth., Disp: , Rfl:     magnesium gluconate 500 mg (27 mg  elemental) tablet, Take  by mouth two (2) times a day., Disp: , Rfl:     b complex vitamins tablet, Take  by mouth., Disp: , Rfl:     arginine 500 mg tablet, Take 500 mg by mouth., Disp: , Rfl:     omega-3 fatty acids-vitamin e (FISH OIL) 1,000 mg cap, Take 1 Cap by mouth., Disp: , Rfl:     co-enzyme Q-10 (CO Q-10) 100 mg capsule, Take 100 mg by mouth daily. , Disp: , Rfl:     Cholecalciferol, Vitamin D3, (VITAMIN D) 1,000 unit Cap, Take  by mouth daily. , Disp: , Rfl:     ASPIRIN 81 mg Tab, Take 2 Tabs by mouth daily. , Disp: , Rfl:    Date Last Reviewed:  11/29/2017   # of Personal Factors/Comorbidities that affect the Plan of Care: 3+: HIGH COMPLEXITY   EXAMINATION:   Observation/Orthostatic Postural Assessment:           Forward head   Palpation:          Increased pain and tone B suboccipitals and R UT, decreaased facet joint mobility B upper C/S with alteral glides and decreased lower C/S post glide  ROM:     C/S LR 60 deg, RR 55 deg, flex WNL, ext 50% limited with pain, B SB WNL      Strength:     mmt 5-/5 B UE  C/S endurance test < 10 sec with chin tuck/lift test due to weakness with no pain      Special Tests:          Spurlings negative, Transverve and Alar ligament negative, Cervical flexion with rotation WNL B rotation, CRLF test WNL B   Neurological Screen:        Sensation: light touch intact B UE  Functional Mobility:         Gait/Ambulation:  WNL  Balance: SLB good B LE   Body Structures Involved:  1. Joints  2. Muscles Body Functions Affected:  1. Neuromusculoskeletal  2. Movement Related Activities and Participation Affected:  1. General Tasks and Demands  2. Self Care  3. Domestic Life   # of elements that affect the Plan of Care: 1-2: LOW COMPLEXITY   CLINICAL PRESENTATION:   Presentation: Stable and uncomplicated: LOW COMPLEXITY   CLINICAL DECISION MAKING:   Outcome Measure: Tool Used: Neck Disability Index (NDI)  Score:  Initial: 21/50  Most Recent: X/50 (Date: -- )   Interpretation of Score: The Neck Disability Index is a revised form of the Oswestry Low Back Pain Index and is designed to measure the activities of daily living in person's with neck pain. Each section is scored on a 0-5 scale, 5 representing the greatest disability. The scores of each section are added together for a total score of 50. Score 0 1-10 11-20 21-30 31-40 41-49 50   Modifier CH CI CJ CK CL CM CN     ? Carrying, Moving, and Handling Objects:     - CURRENT STATUS: CK - 40%-59% impaired, limited or restricted    - GOAL STATUS: CI - 1%-19% impaired, limited or restricted    - D/C STATUS:  ---------------To be determined---------------      Medical Necessity:   · Patient is expected to demonstrate progress in range of motion and functional technique to increase independence with ROM. Reason for Services/Other Comments:  · Patient has been observed to have decreased ROM before, during or after an intervention. Use of outcome tool(s) and clinical judgement create a POC that gives a: Clear prediction of patient's progress: LOW COMPLEXITY   TREATMENT:   (In addition to Assessment/Re-Assessment sessions the following treatments were rendered)  THERAPEUTIC EXERCISE: (see grid for minutes):  Exercises per grid below to improve mobility and strength.   Required moderate visual, verbal, manual and tactile cues to promote proper body alignment, promote proper body posture and promote proper body mechanics. Progressed resistance, range and repetitions as indicated. MANUAL THERAPY: (see grid for minutes): Joint mobilization and Soft tissue mobilization was utilized and necessary because of the patient's restricted joint motion, painful spasm, loss of articular motion and restricted motion of soft tissue. MODALITIES: (see grid for minutes): *  Electrical Stimulation Therapy (IFC) was provided with intensity adjusted throughout treatment to patient tolerance. to reduce pain       *  Cold Pack Therapy in order to provide analgesia and reduce inflammation and edema. *  Hot Pack Therapy in order to relieve muscle spasm. Date: 11/29/17       Modalities: 15 min       IFC and heat repeat                       Therapeutic Exercise: 10 mins       C/S rotation supine 10x       C/s chin tucks supine 10x5\"       Seated scapula retraction 10x5\"                               Proprioceptive Activities:                                Manual Therapy: 5 mins       FDN/MFR L rectus oblique , R capitus rectus               Therapeutic Activities:                                        HEP: Provided HEP handout on 11/29/17  Treatment/Session Assessment:  He demonstrated good teach back with HEP. · Pain/ Symptoms: Initial:   3/10  Right sides neck pain and right sided head pain Post Session:  3/10 ·   Compliance with Program/Exercises: Will assess as treatment progresses. · Recommendations/Intent for next treatment session: \"Next visit will focus on advancements to more challenging activities\".   Total Treatment Duration:  PT Patient Time In/Time Out  Time In: 1145  Time Out: [address]

## 2017-11-30 NOTE — PROGRESS NOTES
Loren Mullen. : 1945 [id number] [facility]  [address] [place] [address].  [phone]   [phone]        OUTPATIENT PHYSICAL THERAPY:Daily Note 2017        ICD-10: Treatment Diagnosis: Cervicalgia (M54.2)  Precautions/Allergies: Other medication and Red wine extract   Fall Risk Score: 1 (? 5 = High Risk)  MD Orders: Evaluation and treatment MEDICAL/REFERRING DIAGNOSIS:  Headache [R51]   DATE OF ONSET: Chronic 2016  REFERRING PHYSICIAN: Jennifer Mccarty MD  RETURN PHYSICIAN APPOINTMENT: TBD     INITIAL ASSESSMENT:  Mr. Mazin Rasmussen presents with decreased C/S AROM, upper C/S facet joint restriction, and upper neck myofascial pain and restriction. His symptoms are consistent with cervicogenic headache and neck pain. He has no red flags. He will benefit from PT services to improve function and decrease pain. PROBLEM LIST (Impacting functional limitations):  1. Decreased Strength  2. Decreased ADL/Functional Activities  3. Increased Pain  4. Decreased Flexibility/Joint Mobility INTERVENTIONS PLANNED:  1. Cold  2. Cryotherapy  3. Electrical Stimulation  4. Heat  5. Home Exercise Program (HEP)  6. Manual Therapy  7. Neuromuscular Re-education/Strengthening  8. Range of Motion (ROM)  9. Therapeutic Activites  10. Therapeutic Exercise/Strengthening   TREATMENT PLAN:  Effective Dates: 17 TO 18. Frequency/Duration: 2 times a week for 12 weeks  GOALS: (Goals have been discussed and agreed upon with patient.)  Short-Term Functional Goals: Time Frame: 2 weeks  1. Patient will be independent with HEP without pain. 2. Patient will have improved posture to WNL decreasing pain when he is reading. Discharge Goals: Time Frame: 12 weeks  1. Patient will have improved NDI score ot < 5 point of disability allowing him improved community participation.   2. Patient will have improved C/S AROM to > 70 deg without increased pain allowing him to look over his shoulder. 3. Patient will have improved C/S extension to > 75% of motion allowing him to look up without pan < 0/10. Rehabilitation Potential For Stated Goals: Good  Regarding Keaton Robin Jr.'s therapy, I certify that the treatment plan above will be carried out by a therapist or under their direction. Thank you for this referral,  Barbara Giang, PT       Referring Physician Signature: Panda Solares MD              Date                      HISTORY:   History of Present Injury/Illness (Reason for Referral):  75 y/o M with c/o of right sided neck and right posterior to anterior head pain. He reports MRI in June 2017 that diagnosed hydrocephalus. He is under the care of a neurologist that said his symptoms are not caused by hydrocephaslus  He reports 3/10 pain at his best but he reports his pain was a 7/10 1 month ago when he bent over. He is familiar to clinic and has has PT for neck pain and [address] earlier in the calender year of 2017. His symptoms of neck pain and ramshorn headache were remediated. He was d/c to his referring MD 2/2 to him continuing to have headache at the crown of his head that is only provoked with bending forward. He reported symptom relief with Advil. He reports fall off ladder > 3 months ago. Past Medical History/Comorbidities:   Mr. Carlos Oneal  has a past medical history of ADD (attention deficit disorder); Anemia, unspecified; Aneurysm of unspecified site; Anxiety; Arthritis; Attention deficit disorder with hyperactivity(314.01); Benign paroxysmal vertigo; BPH (benign prostatic hypertrophy); Chronic prostatitis (11/15/2013); Colitis, enteritis, and gastroenteritis of presumed infectious origin; Depressive disorder, not elsewhere classified; Disorders of bursae and tendons in shoulder region, unspecified; Fatty liver; GERD (gastroesophageal reflux disease);  History of tetanus, diphtheria, and acellular pertussis booster vaccination (Tdap); Hypercholesteremia; Hypertrophy of prostate with urinary obstruction and other lower urinary tract symptoms (LUTS) (11/15/2013); Impotence of organic origin; Inguinal hernia (2014); Insomnia, unspecified; Medial epicondylitis of elbow; Nonspecific elevation of levels of transaminase or lactic acid dehydrogenase (LDH); Orthostatic hypotension; Osteoporosis (11/15/2013); Other and unspecified hyperlipidemia; Tremor; Type II or unspecified type diabetes mellitus without mention of complication, not stated as uncontrolled; and Vertigo. Mr. Boogie Helm  has a past surgical history that includes orthopaedic (2010); heent; mohs procedure (Right); vasectomy; urological; urological; turp ([id number]); knee arthroscopy (Right, 2000); lumbar laminectomy (2/2012); shoulder arthroscopy (Right, 1995); tonsillectomy; cystoscopy,insert ureteral stent; and hernia repair (Bilateral, 2/3/14). He reports 2 heart stents placed at the same time (/30/17. He does not have a pacemaker. Social History/Living Environment:    Lives with spouse  Prior Level of Function/Work/Activity:      Independent   Dominant Side:         LEFT    Current Medications:    Current Outpatient Prescriptions:     nitroglycerin (NITROSTAT) 0.4 mg SL tablet, 0.4 mg by SubLINGual route., Disp: , Rfl:     sertraline (ZOLOFT) 50 mg tablet, Take 75 mg by mouth., Disp: , Rfl:     DRYSOL 20 % external solution, U UTD, Disp: , Rfl: 4    clonazePAM (KLONOPIN) 1 mg tablet, , Disp: , Rfl: 2    dextroamphetamine-amphetamine (ADDERALL) 20 mg tablet, , Disp: , Rfl: 0    atorvastatin (LIPITOR) 80 mg tablet, Take 80 mg by mouth., Disp: , Rfl:     omeprazole (PRILOSEC) 20 mg capsule, TK 1 C PO BID 30 MIN B WILIAM AND DINNER, Disp: , Rfl: 3    BRILINTA 90 mg tablet, , Disp: , Rfl: 11    triamterene-hydroCHLOROthiazide (DYAZIDE) 37.5-25 mg per capsule, Take 1 Cap by mouth daily. , Disp: 30 Cap, Rfl: 5    multivitamin (ONE A DAY) tablet, Take 1 Tab by mouth daily. , Disp: , Rfl:   B.infantis-B.ani-B.long-B.bifi (PROBIOTIC 4X) 10-15 mg TbEC, Take  by mouth., Disp: , Rfl:     turmeric root extract 500 mg cap, 1 per mouth daily, Disp: , Rfl:     propranolol (INDERAL) 10 mg tablet, TK 1 T PO BID FOR JITTERNESS, Disp: , Rfl: 0    cpap machine kit, by Does Not Apply route. autopap 5-15 cm, Disp: , Rfl:     potassium chloride SR (KLOR-CON 10) 10 mEq tablet, Take  by mouth., Disp: , Rfl:     magnesium gluconate 500 mg (27 mg  elemental) tablet, Take  by mouth two (2) times a day., Disp: , Rfl:     b complex vitamins tablet, Take  by mouth., Disp: , Rfl:     arginine 500 mg tablet, Take 500 mg by mouth., Disp: , Rfl:     omega-3 fatty acids-vitamin e (FISH OIL) 1,000 mg cap, Take 1 Cap by mouth., Disp: , Rfl:     co-enzyme Q-10 (CO Q-10) 100 mg capsule, Take 100 mg by mouth daily. , Disp: , Rfl:     Cholecalciferol, Vitamin D3, (VITAMIN D) 1,000 unit Cap, Take  by mouth daily. , Disp: , Rfl:     ASPIRIN 81 mg Tab, Take 2 Tabs by mouth daily. , Disp: , Rfl:    Date Last Reviewed:  12/1/2017   # of Personal Factors/Comorbidities that affect the Plan of Care: 3+: HIGH COMPLEXITY   EXAMINATION:   Observation/Orthostatic Postural Assessment: Forward head   Palpation:          Increased pain and tone B suboccipitals and R UT, decreaased facet joint mobility B upper C/S with alteral glides and decreased lower C/S post glide  ROM:     C/S LR 60 deg, RR 55 deg, flex WNL, ext 50% limited with pain, B SB WNL      Strength:     mmt 5-/5 B UE  C/S endurance test < 10 sec with chin tuck/lift test due to weakness with no pain      Special Tests:          Spurlings negative, Transverve and Alar ligament negative, Cervical flexion with rotation WNL B rotation, CRLF test WNL B   Neurological Screen:        Sensation: light touch intact B UE  Functional Mobility:         Gait/Ambulation:  WNL  Balance:          SLB good B LE   Body Structures Involved:  1. Joints  2.  Muscles Body Functions Affected:  1. Neuromusculoskeletal  2. Movement Related Activities and Participation Affected:  1. General Tasks and Demands  2. Self Care  3. Domestic Life   # of elements that affect the Plan of Care: 1-2: LOW COMPLEXITY   CLINICAL PRESENTATION:   Presentation: Stable and uncomplicated: LOW COMPLEXITY   CLINICAL DECISION MAKING:   Outcome Measure: Tool Used: Neck Disability Index (NDI)  Score:  Initial: 21/50  Most Recent: X/50 (Date: -- )   Interpretation of Score: The Neck Disability Index is a revised form of the Oswestry Low Back Pain Index and is designed to measure the activities of daily living in person's with neck pain. Each section is scored on a 0-5 scale, 5 representing the greatest disability. The scores of each section are added together for a total score of 50. Score 0 1-10 11-20 21-30 31-40 41-49 50   Modifier CH CI CJ CK CL CM CN     ? Carrying, Moving, and Handling Objects:     - CURRENT STATUS: CK - 40%-59% impaired, limited or restricted    - GOAL STATUS: CI - 1%-19% impaired, limited or restricted    - D/C STATUS:  ---------------To be determined---------------      Medical Necessity:   · Patient is expected to demonstrate progress in range of motion and functional technique to increase independence with ROM. Reason for Services/Other Comments:  · Patient has been observed to have decreased ROM before, during or after an intervention. Use of outcome tool(s) and clinical judgement create a POC that gives a: Clear prediction of patient's progress: LOW COMPLEXITY   TREATMENT:   (In addition to Assessment/Re-Assessment sessions the following treatments were rendered)  THERAPEUTIC EXERCISE: (see grid for minutes):  Exercises per grid below to improve mobility and strength. Required moderate visual, verbal, manual and tactile cues to promote proper body alignment, promote proper body posture and promote proper body mechanics.   Progressed resistance, range and repetitions as indicated. MANUAL THERAPY: (see grid for minutes): Joint mobilization and Soft tissue mobilization was utilized and necessary because of the patient's restricted joint motion, painful spasm, loss of articular motion and restricted motion of soft tissue. MODALITIES: (see grid for minutes): *  Electrical Stimulation Therapy (IFC) was provided with intensity adjusted throughout treatment to patient tolerance. to reduce pain       *  Cold Pack Therapy in order to provide analgesia and reduce inflammation and edema. *  Hot Pack Therapy in order to relieve muscle spasm. Date: 11/29/17 12/1/17  (visit 2)      Modalities: 15 min 15 mins      IFC and heat repeat repeat                      Therapeutic Exercise: 10 mins 25 min      C/S rotation supine 10x 10x      C/s chin tucks supine 10x5\" 10x      Seated scapula retraction 10x5\" 10x3 rows BTB      C/S ball on wall series   10x retraction 10x rot      Quad protraction/retraction  10x      Quad cat/cow  10x      Proprioceptive Activities:                                Manual Therapy: 5 mins 15 mins      FDN/MFR L rectus oblique , R capitus rectus Repeat prone and supine              Therapeutic Activities:                                        HEP: Provided HEP handout on 11/29/17  Treatment/Session Assessment:  He has improved c/S AROM. Con't with POC. · Pain/ Symptoms: Initial:   1/10  \"I feel better\" Post Session:  0/10 No increased pain ·   Compliance with Program/Exercises: Will assess as treatment progresses. · Recommendations/Intent for next treatment session: \"Next visit will focus on advancements to more challenging activities\".   Total Treatment Duration:  PT Patient Time In/Time Out  Time In: 1100  Time Out: Freddie [id number], PT

## 2017-12-01 ENCOUNTER — HOSPITAL ENCOUNTER (OUTPATIENT)
Dept: PHYSICAL THERAPY | Age: 72
Discharge: HOME | End: 2017-12-01
Payer: MEDICARE

## 2017-12-01 PROCEDURE — 97140 MANUAL THERAPY 1/> REGIONS: CPT

## 2017-12-01 PROCEDURE — 97014 ELECTRIC STIMULATION THERAPY: CPT

## 2017-12-01 PROCEDURE — 97110 THERAPEUTIC EXERCISES: CPT

## 2017-12-04 NOTE — PROGRESS NOTES
Jaylyn Zavala. : 1945 [address] [place] [address],[address]  [address] [place] [address]  [phone]   [phone]        OUTPATIENT PHYSICAL THERAPY:Daily Note 2017        ICD-10: Treatment Diagnosis: Cervicalgia (M54.2)  Precautions/Allergies: Other medication and Red wine extract   Fall Risk Score: 1 (? 5 = High Risk)  MD Orders: Evaluation and treatment MEDICAL/REFERRING DIAGNOSIS:  Headache [R51]   DATE OF ONSET: Chronic 2016  REFERRING PHYSICIAN: Ozie Babinski, MD  RETURN PHYSICIAN APPOINTMENT: TBD     INITIAL ASSESSMENT:  Mr. Alo Santos presents with decreased C/S AROM, upper C/S facet joint restriction, and upper neck myofascial pain and restriction. His symptoms are consistent with cervicogenic headache and neck pain. He has no red flags. He will benefit from PT services to improve function and decrease pain. PROBLEM LIST (Impacting functional limitations):  1. Decreased Strength  2. Decreased ADL/Functional Activities  3. Increased Pain  4. Decreased Flexibility/Joint Mobility INTERVENTIONS PLANNED:  1. Cold  2. Cryotherapy  3. Electrical Stimulation  4. Heat  5. Home Exercise Program (HEP)  6. Manual Therapy  7. Neuromuscular Re-education/Strengthening  8. Range of Motion (ROM)  9. Therapeutic Activites  10. Therapeutic Exercise/Strengthening   TREATMENT PLAN:  Effective Dates: 17 TO 18. Frequency/Duration: 2 times a week for 12 weeks  GOALS: (Goals have been discussed and agreed upon with patient.)  Short-Term Functional Goals: Time Frame: 2 weeks  1. Patient will be independent with HEP without pain. 2. Patient will have improved posture to WNL decreasing pain when he is reading. Discharge Goals: Time Frame: 12 weeks  1. Patient will have improved NDI score ot < 5 point of disability allowing him improved community participation.   2. Patient will have improved C/S AROM to > 70 deg without increased pain allowing him to look over his shoulder. 3. Patient will have improved C/S extension to > 75% of motion allowing him to look up without pan < 0/10. Rehabilitation Potential For Stated Goals: Good                HISTORY:   History of Present Injury/Illness (Reason for Referral):  73 y/o M with c/o of right sided neck and right posterior to anterior head pain. He reports MRI in June 2017 that diagnosed hydrocephalus. He is under the care of a neurologist that said his symptoms are not caused by hydrocephaslus  He reports 3/10 pain at his best but he reports his pain was a 7/10 1 month ago when he bent over. He is familiar to clinic and has has PT for neck pain and [facility] earlier in the calender year of 2017. His symptoms of neck pain and ramshorn headache were remediated. He was d/c to his referring MD 2/2 to him continuing to have headache at the crown of his head that is only provoked with bending forward. He reported symptom relief with Advil. He reports fall off ladder > 3 months ago. Past Medical History/Comorbidities:   Mr. Chau Sommers  has a past medical history of ADD (attention deficit disorder); Anemia, unspecified; Aneurysm of unspecified site; Anxiety; Arthritis; Attention deficit disorder with hyperactivity(314.01); Benign paroxysmal vertigo; BPH (benign prostatic hypertrophy); Chronic prostatitis (11/15/2013); Colitis, enteritis, and gastroenteritis of presumed infectious origin; Depressive disorder, not elsewhere classified; Disorders of bursae and tendons in shoulder region, unspecified; Fatty liver; GERD (gastroesophageal reflux disease); History of tetanus, diphtheria, and acellular pertussis booster vaccination (Tdap); Hypercholesteremia; Hypertrophy of prostate with urinary obstruction and other lower urinary tract symptoms (LUTS) (11/15/2013); Impotence of organic origin; Inguinal hernia (2014);  Insomnia, unspecified; Medial epicondylitis of elbow; Nonspecific elevation of levels of transaminase or lactic acid dehydrogenase (LDH); Orthostatic hypotension; Osteoporosis (11/15/2013); Other and unspecified hyperlipidemia; Tremor; Type II or unspecified type diabetes mellitus without mention of complication, not stated as uncontrolled; and Vertigo. Mr. Naty Jones  has a past surgical history that includes orthopaedic (2010); heent; mohs procedure (Right); vasectomy; urological; urological; turp (4081,1115); knee arthroscopy (Right, 2000); lumbar laminectomy (2/2012); shoulder arthroscopy (Right, 1995); tonsillectomy; cystoscopy,insert ureteral stent; and hernia repair (Bilateral, 2/3/14). He reports 2 heart stents placed at the same time (/30/17. He does not have a pacemaker. Social History/Living Environment:    Lives with spouse  Prior Level of Function/Work/Activity:      Independent   Dominant Side:         LEFT    Current Medications:    Current Outpatient Prescriptions:     nitroglycerin (NITROSTAT) 0.4 mg SL tablet, 0.4 mg by SubLINGual route., Disp: , Rfl:     sertraline (ZOLOFT) 50 mg tablet, Take 75 mg by mouth., Disp: , Rfl:     DRYSOL 20 % external solution, U UTD, Disp: , Rfl: 4    clonazePAM (KLONOPIN) 1 mg tablet, , Disp: , Rfl: 2    dextroamphetamine-amphetamine (ADDERALL) 20 mg tablet, , Disp: , Rfl: 0    atorvastatin (LIPITOR) 80 mg tablet, Take 80 mg by mouth., Disp: , Rfl:     omeprazole (PRILOSEC) 20 mg capsule, TK 1 C PO BID 30 MIN B WILIAM AND DINNER, Disp: , Rfl: 3    BRILINTA 90 mg tablet, , Disp: , Rfl: 11    triamterene-hydroCHLOROthiazide (DYAZIDE) 37.5-25 mg per capsule, Take 1 Cap by mouth daily. , Disp: 30 Cap, Rfl: 5    multivitamin (ONE A DAY) tablet, Take 1 Tab by mouth daily. , Disp: , Rfl:     B.infantis-B.ani-B.long-B.bifi (PROBIOTIC 4X) 10-15 mg TbEC, Take  by mouth., Disp: , Rfl:     turmeric root extract 500 mg cap, 1 per mouth daily, Disp: , Rfl:     propranolol (INDERAL) 10 mg tablet, TK 1 T PO BID FOR JITTERNESS, Disp: , Rfl: 0    cpap machine kit, by Does Not Apply route. autopap 5-15 cm, Disp: , Rfl:     potassium chloride SR (KLOR-CON 10) 10 mEq tablet, Take  by mouth., Disp: , Rfl:     magnesium gluconate 500 mg (27 mg  elemental) tablet, Take  by mouth two (2) times a day., Disp: , Rfl:     b complex vitamins tablet, Take  by mouth., Disp: , Rfl:     arginine 500 mg tablet, Take 500 mg by mouth., Disp: , Rfl:     omega-3 fatty acids-vitamin e (FISH OIL) 1,000 mg cap, Take 1 Cap by mouth., Disp: , Rfl:     co-enzyme Q-10 (CO Q-10) 100 mg capsule, Take 100 mg by mouth daily. , Disp: , Rfl:     Cholecalciferol, Vitamin D3, (VITAMIN D) 1,000 unit Cap, Take  by mouth daily. , Disp: , Rfl:     ASPIRIN 81 mg Tab, Take 2 Tabs by mouth daily. , Disp: , Rfl:    Date Last Reviewed:  12/5/2017   EXAMINATION:   Observation/Orthostatic Postural Assessment: Forward head   Palpation:          Increased pain and tone B suboccipitals and R UT, decreaased facet joint mobility B upper C/S with alteral glides and decreased lower C/S post glide  ROM:     C/S LR 60 deg, RR 55 deg, flex WNL, ext 50% limited with pain, B SB WNL      Strength:     mmt 5-/5 B UE  C/S endurance test < 10 sec with chin tuck/lift test due to weakness with no pain      Special Tests:          Spurlings negative, Transverve and Alar ligament negative, Cervical flexion with rotation WNL B rotation, CRLF test WNL B   Neurological Screen:        Sensation: light touch intact B UE  Functional Mobility:         Gait/Ambulation:  WNL  Balance:          SLB good B LE   Body Structures Involved:  1. Joints  2. Muscles Body Functions Affected:  1. Neuromusculoskeletal  2. Movement Related Activities and Participation Affected:  1. General Tasks and Demands  2. Self Care  3. Domestic Life   CLINICAL PRESENTATION:   CLINICAL DECISION MAKING:   Outcome Measure: Tool Used: Neck Disability Index (NDI)  Score:  Initial: 21/50  Most Recent: X/50 (Date: -- )   Interpretation of Score:  The Neck Disability Index is a revised form of the Oswestry Low Back Pain Index and is designed to measure the activities of daily living in person's with neck pain. Each section is scored on a 0-5 scale, 5 representing the greatest disability. The scores of each section are added together for a total score of 50. Score 0 1-10 11-20 21-30 31-40 41-49 50   Modifier CH CI CJ CK CL CM CN     ? Carrying, Moving, and Handling Objects:     - CURRENT STATUS: CK - 40%-59% impaired, limited or restricted    - GOAL STATUS: CI - 1%-19% impaired, limited or restricted    - D/C STATUS:  ---------------To be determined---------------      Medical Necessity:   · Patient is expected to demonstrate progress in range of motion and functional technique to increase independence with ROM. Reason for Services/Other Comments:  · Patient has been observed to have decreased ROM before, during or after an intervention. TREATMENT:   (In addition to Assessment/Re-Assessment sessions the following treatments were rendered)  THERAPEUTIC EXERCISE: (see grid for minutes):  Exercises per grid below to improve mobility and strength. Required moderate visual, verbal, manual and tactile cues to promote proper body alignment, promote proper body posture and promote proper body mechanics. Progressed resistance, range and repetitions as indicated. MANUAL THERAPY: (see grid for minutes): Joint mobilization and Soft tissue mobilization was utilized and necessary because of the patient's restricted joint motion, painful spasm, loss of articular motion and restricted motion of soft tissue. MODALITIES: (see grid for minutes): *  Electrical Stimulation Therapy (IFC) was provided with intensity adjusted throughout treatment to patient tolerance. to reduce pain       *  Cold Pack Therapy in order to provide analgesia and reduce inflammation and edema. *  Hot Pack Therapy in order to relieve muscle spasm.      Date: 11/29/17 12/1/17  (visit 2) 12/5/17  (visit 3)     Modalities: 15 min 15 mins 15 min     IFC and heat repeat repeat repeat                     Therapeutic Exercise: 10 mins 25 min 15 min     C/S rotation supine 10x 10x 10x     C/s chin tucks supine 10x5\" 10x 10x     Seated scapula retraction 10x5\" 10x3 rows BTB 3x10 black TB     C/S ball on wall series   10x retraction 10x rot 10x each      Quad protraction/retraction  10x 10x     Quad cat/cow  10x 10x     Proprioceptive Activities:                                Manual Therapy: 5 mins 15 mins 25 mins     FDN/MFR L rectus oblique , R capitus rectus Repeat prone and supine repeat             Therapeutic Activities:                                        HEP: Provided HEP handout on 11/29/17  Treatment/Session Assessment:  He has improve body mechanics. Con't with POC. · Pain/ Symptoms: Initial:   2/10  \"I feel better but I flared up my headache on Sunday looking up\" Post Session:  0/10 No increased pain ·   Compliance with Program/Exercises: Will assess as treatment progresses. · Recommendations/Intent for next treatment session: \"Next visit will focus on advancements to more challenging activities\".   Total Treatment Duration:  PT Patient Time In/Time Out  Time In: 0845  Time Out: [address], PT

## 2017-12-05 ENCOUNTER — HOSPITAL ENCOUNTER (OUTPATIENT)
Dept: PHYSICAL THERAPY | Age: 72
Discharge: HOME | End: 2017-12-05
Payer: MEDICARE

## 2017-12-05 PROCEDURE — 97014 ELECTRIC STIMULATION THERAPY: CPT

## 2017-12-05 PROCEDURE — 97110 THERAPEUTIC EXERCISES: CPT

## 2017-12-05 PROCEDURE — 97140 MANUAL THERAPY 1/> REGIONS: CPT

## 2017-12-07 ENCOUNTER — HOSPITAL ENCOUNTER (OUTPATIENT)
Dept: PHYSICAL THERAPY | Age: 72
Discharge: HOME | End: 2017-12-07
Payer: MEDICARE

## 2017-12-07 PROCEDURE — 97110 THERAPEUTIC EXERCISES: CPT

## 2017-12-07 PROCEDURE — 97140 MANUAL THERAPY 1/> REGIONS: CPT

## 2017-12-07 PROCEDURE — 97014 ELECTRIC STIMULATION THERAPY: CPT

## 2017-12-07 NOTE — PROGRESS NOTES
[facility]. : 1945 [id number] [facility],[address]  [address] [place] [address].  [phone]   [phone]        OUTPATIENT PHYSICAL THERAPY:Daily Note 2017        ICD-10: Treatment Diagnosis: Cervicalgia (M54.2)  Precautions/Allergies: Other medication and Red wine extract   Fall Risk Score: 1 (? 5 = High Risk)  MD Orders: Evaluation and treatment MEDICAL/REFERRING DIAGNOSIS:  Headache [R51]   DATE OF ONSET: Chronic   REFERRING PHYSICIAN: Christin Yung MD  RETURN PHYSICIAN APPOINTMENT: TBD     INITIAL ASSESSMENT:  Mr. David Walker presents with decreased C/S AROM, upper C/S facet joint restriction, and upper neck myofascial pain and restriction. His symptoms are consistent with cervicogenic headache and neck pain. He has no red flags. He will benefit from PT services to improve function and decrease pain. PROBLEM LIST (Impacting functional limitations):  1. Decreased Strength  2. Decreased ADL/Functional Activities  3. Increased Pain  4. Decreased Flexibility/Joint Mobility INTERVENTIONS PLANNED:  1. Cold  2. Cryotherapy  3. Electrical Stimulation  4. Heat  5. Home Exercise Program (HEP)  6. Manual Therapy  7. Neuromuscular Re-education/Strengthening  8. Range of Motion (ROM)  9. Therapeutic Activites  10. Therapeutic Exercise/Strengthening   TREATMENT PLAN:  Effective Dates: 17 TO 18. Frequency/Duration: 2 times a week for 12 weeks  GOALS: (Goals have been discussed and agreed upon with patient.)  Short-Term Functional Goals: Time Frame: 2 weeks  1. Patient will be independent with HEP without pain. 2. Patient will have improved posture to WNL decreasing pain when he is reading. Discharge Goals: Time Frame: 12 weeks  1. Patient will have improved NDI score ot < 5 point of disability allowing him improved community participation.   2. Patient will have improved C/S AROM to > 70 deg without increased pain allowing him to look over his shoulder. 3. Patient will have improved C/S extension to > 75% of motion allowing him to look up without pan < 0/10. Rehabilitation Potential For Stated Goals: Good                HISTORY:   History of Present Injury/Illness (Reason for Referral):  75 y/o M with c/o of right sided neck and right posterior to anterior head pain. He reports MRI in June 2017 that diagnosed hydrocephalus. He is under the care of a neurologist that said his symptoms are not caused by hydrocephaslus  He reports 3/10 pain at his best but he reports his pain was a 7/10 1 month ago when he bent over. He is familiar to clinic and has has PT for neck pain and [address] earlier in the calender year of 2017. His symptoms of neck pain and ramshorn headache were remediated. He was d/c to his referring MD 2/2 to him continuing to have headache at the crown of his head that is only provoked with bending forward. He reported symptom relief with Advil. He reports fall off ladder > 3 months ago. Past Medical History/Comorbidities:   Mr. Melissa Lam  has a past medical history of ADD (attention deficit disorder); Anemia, unspecified; Aneurysm of unspecified site; Anxiety; Arthritis; Attention deficit disorder with hyperactivity(314.01); Benign paroxysmal vertigo; BPH (benign prostatic hypertrophy); Chronic prostatitis (11/15/2013); Colitis, enteritis, and gastroenteritis of presumed infectious origin; Depressive disorder, not elsewhere classified; Disorders of bursae and tendons in shoulder region, unspecified; Fatty liver; GERD (gastroesophageal reflux disease); History of tetanus, diphtheria, and acellular pertussis booster vaccination (Tdap); Hypercholesteremia; Hypertrophy of prostate with urinary obstruction and other lower urinary tract symptoms (LUTS) (11/15/2013); Impotence of organic origin; Inguinal hernia (2014);  Insomnia, unspecified; Medial epicondylitis of elbow; Nonspecific elevation of levels of transaminase or lactic acid dehydrogenase (LDH); Orthostatic hypotension; Osteoporosis (11/15/2013); Other and unspecified hyperlipidemia; Tremor; Type II or unspecified type diabetes mellitus without mention of complication, not stated as uncontrolled; and Vertigo. Mr. Krystina Patel  has a past surgical history that includes orthopaedic (2010); heent; mohs procedure (Right); vasectomy; urological; urological; turp (7163,8643); knee arthroscopy (Right, 2000); lumbar laminectomy (2/2012); shoulder arthroscopy (Right, 1995); tonsillectomy; cystoscopy,insert ureteral stent; and hernia repair (Bilateral, 2/3/14). He reports 2 heart stents placed at the same time (/30/17. He does not have a pacemaker. Social History/Living Environment:    Lives with spouse  Prior Level of Function/Work/Activity:      Independent   Dominant Side:         LEFT    Current Medications:    Current Outpatient Prescriptions:     nitroglycerin (NITROSTAT) 0.4 mg SL tablet, 0.4 mg by SubLINGual route., Disp: , Rfl:     sertraline (ZOLOFT) 50 mg tablet, Take 75 mg by mouth., Disp: , Rfl:     DRYSOL 20 % external solution, U UTD, Disp: , Rfl: 4    clonazePAM (KLONOPIN) 1 mg tablet, , Disp: , Rfl: 2    dextroamphetamine-amphetamine (ADDERALL) 20 mg tablet, , Disp: , Rfl: 0    atorvastatin (LIPITOR) 80 mg tablet, Take 80 mg by mouth., Disp: , Rfl:     omeprazole (PRILOSEC) 20 mg capsule, TK 1 C PO BID 30 MIN B WILIAM AND DINNER, Disp: , Rfl: 3    BRILINTA 90 mg tablet, , Disp: , Rfl: 11    triamterene-hydroCHLOROthiazide (DYAZIDE) 37.5-25 mg per capsule, Take 1 Cap by mouth daily. , Disp: 30 Cap, Rfl: 5    multivitamin (ONE A DAY) tablet, Take 1 Tab by mouth daily. , Disp: , Rfl:     B.infantis-B.ani-B.long-B.bifi (PROBIOTIC 4X) 10-15 mg TbEC, Take  by mouth., Disp: , Rfl:     turmeric root extract 500 mg cap, 1 per mouth daily, Disp: , Rfl:     propranolol (INDERAL) 10 mg tablet, TK 1 T PO BID FOR JITTERNESS, Disp: , Rfl: 0    cpap machine kit, by Does Not Apply route. autopap 5-15 cm, Disp: , Rfl:     potassium chloride SR (KLOR-CON 10) 10 mEq tablet, Take  by mouth., Disp: , Rfl:     magnesium gluconate 500 mg (27 mg  elemental) tablet, Take  by mouth two (2) times a day., Disp: , Rfl:     b complex vitamins tablet, Take  by mouth., Disp: , Rfl:     arginine 500 mg tablet, Take 500 mg by mouth., Disp: , Rfl:     omega-3 fatty acids-vitamin e (FISH OIL) 1,000 mg cap, Take 1 Cap by mouth., Disp: , Rfl:     co-enzyme Q-10 (CO Q-10) 100 mg capsule, Take 100 mg by mouth daily. , Disp: , Rfl:     Cholecalciferol, Vitamin D3, (VITAMIN D) 1,000 unit Cap, Take  by mouth daily. , Disp: , Rfl:     ASPIRIN 81 mg Tab, Take 2 Tabs by mouth daily. , Disp: , Rfl:    Date Last Reviewed:  12/7/2017   EXAMINATION:   Observation/Orthostatic Postural Assessment: Forward head   Palpation:          Increased pain and tone B suboccipitals and R UT, decreaased facet joint mobility B upper C/S with alteral glides and decreased lower C/S post glide  ROM:     C/S LR 60 deg, RR 55 deg, flex WNL, ext 50% limited with pain, B SB WNL      Strength:     mmt 5-/5 B UE  C/S endurance test < 10 sec with chin tuck/lift test due to weakness with no pain      Special Tests:          Spurlings negative, Transverve and Alar ligament negative, Cervical flexion with rotation WNL B rotation, CRLF test WNL B   Neurological Screen:        Sensation: light touch intact B UE  Functional Mobility:         Gait/Ambulation:  WNL  Balance:          SLB good B LE   Body Structures Involved:  1. Joints  2. Muscles Body Functions Affected:  1. Neuromusculoskeletal  2. Movement Related Activities and Participation Affected:  1. General Tasks and Demands  2. Self Care  3. Domestic Life   CLINICAL PRESENTATION:   CLINICAL DECISION MAKING:   Outcome Measure: Tool Used: Neck Disability Index (NDI)  Score:  Initial: 21/50  Most Recent: X/50 (Date: -- )   Interpretation of Score:  The Neck Disability Index is a revised form of the Oswestry Low Back Pain Index and is designed to measure the activities of daily living in person's with neck pain. Each section is scored on a 0-5 scale, 5 representing the greatest disability. The scores of each section are added together for a total score of 50. Score 0 1-10 11-20 21-30 31-40 41-49 50   Modifier CH CI CJ CK CL CM CN     ? Carrying, Moving, and Handling Objects:     - CURRENT STATUS: CK - 40%-59% impaired, limited or restricted    - GOAL STATUS: CI - 1%-19% impaired, limited or restricted    - D/C STATUS:  ---------------To be determined---------------      Medical Necessity:   · Patient is expected to demonstrate progress in range of motion and functional technique to increase independence with ROM. Reason for Services/Other Comments:  · Patient has been observed to have decreased ROM before, during or after an intervention. TREATMENT:   (In addition to Assessment/Re-Assessment sessions the following treatments were rendered)  THERAPEUTIC EXERCISE: (see grid for minutes):  Exercises per grid below to improve mobility and strength. Required moderate visual, verbal, manual and tactile cues to promote proper body alignment, promote proper body posture and promote proper body mechanics. Progressed resistance, range and repetitions as indicated. MANUAL THERAPY: (see grid for minutes): Joint mobilization and Soft tissue mobilization was utilized and necessary because of the patient's restricted joint motion, painful spasm, loss of articular motion and restricted motion of soft tissue. MODALITIES: (see grid for minutes): *  Electrical Stimulation Therapy (IFC) was provided with intensity adjusted throughout treatment to patient tolerance. to reduce pain       *  Cold Pack Therapy in order to provide analgesia and reduce inflammation and edema. *  Hot Pack Therapy in order to relieve muscle spasm.      Date: 11/29/17 12/1/17  (visit 2) 12/5/17  (visit 3) 12/7/17  (visit 4)    Modalities: 15 min 15 mins 15 min 15 mins    IFC and heat repeat repeat repeat repeat                    Therapeutic Exercise: 10 mins 25 min 15 min 15 mins    C/S rotation supine 10x 10x 10x 10x    C/s chin tucks supine 10x5\" 10x 10x 10x    Seated scapula retraction 10x5\" 10x3 rows BTB 3x10 black TB 3x10    C/S ball on wall series   10x retraction 10x rot 10x each  10x    Quad protraction/retraction  10x 10x 10x    Quad cat/cow  10x 10x 10x    Proprioceptive Activities:                                Manual Therapy: 5 mins 15 mins 25 mins 15 mins    FDN/MFR L rectus oblique , R capitus rectus Repeat prone and supine repeat repeat            Therapeutic Activities:                                        HEP: Provided HEP handout on 11/29/17  Treatment/Session Assessment:  He arrived on time to his appointment but he was talking on his cell phone for 15 minutes. He tolerated exercise progression without c/o of pain. Con't with POC. · Pain/ Symptoms: Initial:   0/10  \"I feel better. I have headaches sometimes when I provoke them with stretches. \" Post Session:  0/10 No increased pain ·   Compliance with Program/Exercises: Will assess as treatment progresses. · Recommendations/Intent for next treatment session: \"Next visit will focus on advancements to more challenging activities\".   Total Treatment Duration:  PT Patient Time In/Time Out  Time In: 1445  Time Out: [address] [facility], PT

## 2017-12-11 ENCOUNTER — APPOINTMENT (OUTPATIENT)
Dept: PHYSICAL THERAPY | Age: 72
End: 2017-12-11
Payer: MEDICARE

## 2017-12-13 ENCOUNTER — HOSPITAL ENCOUNTER (OUTPATIENT)
Dept: PHYSICAL THERAPY | Age: 72
Discharge: HOME | End: 2017-12-13
Payer: MEDICARE

## 2017-12-13 PROCEDURE — 97014 ELECTRIC STIMULATION THERAPY: CPT

## 2017-12-13 PROCEDURE — 97140 MANUAL THERAPY 1/> REGIONS: CPT

## 2017-12-13 PROCEDURE — 97110 THERAPEUTIC EXERCISES: CPT

## 2017-12-13 NOTE — PROGRESS NOTES
[facility]. : 1945 [id number] [facility],[address]  [address] [place] [address].  [phone]   [phone]        OUTPATIENT PHYSICAL THERAPY:Daily Note 2017        ICD-10: Treatment Diagnosis: Cervicalgia (M54.2)  Precautions/Allergies: Other medication and Red wine extract   Fall Risk Score: 1 (? 5 = High Risk)  MD Orders: Evaluation and treatment MEDICAL/REFERRING DIAGNOSIS:  Headache [R51]   DATE OF ONSET: Chronic   REFERRING PHYSICIAN: Gaby Restrepo MD  RETURN PHYSICIAN APPOINTMENT: TBD     INITIAL ASSESSMENT:  Mr. Lakshmi Rios presents with decreased C/S AROM, upper C/S facet joint restriction, and upper neck myofascial pain and restriction. His symptoms are consistent with cervicogenic headache and neck pain. He has no red flags. He will benefit from PT services to improve function and decrease pain. PROBLEM LIST (Impacting functional limitations):  1. Decreased Strength  2. Decreased ADL/Functional Activities  3. Increased Pain  4. Decreased Flexibility/Joint Mobility INTERVENTIONS PLANNED:  1. Cold  2. Cryotherapy  3. Electrical Stimulation  4. Heat  5. Home Exercise Program (HEP)  6. Manual Therapy  7. Neuromuscular Re-education/Strengthening  8. Range of Motion (ROM)  9. Therapeutic Activites  10. Therapeutic Exercise/Strengthening   TREATMENT PLAN:  Effective Dates: 17 TO 18. Frequency/Duration: 2 times a week for 12 weeks  GOALS: (Goals have been discussed and agreed upon with patient.)  Short-Term Functional Goals: Time Frame: 2 weeks  1. Patient will be independent with HEP without pain. 2. Patient will have improved posture to WNL decreasing pain when he is reading. Discharge Goals: Time Frame: 12 weeks  1. Patient will have improved NDI score ot < 5 point of disability allowing him improved community participation.   2. Patient will have improved C/S AROM to > 70 deg without increased pain allowing him to look over his shoulder. 3. Patient will have improved C/S extension to > 75% of motion allowing him to look up without pan < 0/10. Rehabilitation Potential For Stated Goals: Good                HISTORY:   History of Present Injury/Illness (Reason for Referral):  73 y/o M with c/o of right sided neck and right posterior to anterior head pain. He reports MRI in June 2017 that diagnosed hydrocephalus. He is under the care of a neurologist that said his symptoms are not caused by hydrocephaslus  He reports 3/10 pain at his best but he reports his pain was a 7/10 1 month ago when he bent over. He is familiar to clinic and has has PT for neck pain and [id number] earlier in the calender year of 2017. His symptoms of neck pain and ramshorn headache were remediated. He was d/c to his referring MD 2/2 to him continuing to have headache at the crown of his head that is only provoked with bending forward. He reported symptom relief with Advil. He reports fall off ladder > 3 months ago. Past Medical History/Comorbidities:   Mr. Keaton Macario  has a past medical history of ADD (attention deficit disorder); Anemia, unspecified; Aneurysm of unspecified site; Anxiety; Arthritis; Attention deficit disorder with hyperactivity(314.01); Benign paroxysmal vertigo; BPH (benign prostatic hypertrophy); Chronic prostatitis (11/15/2013); Colitis, enteritis, and gastroenteritis of presumed infectious origin; Depressive disorder, not elsewhere classified; Disorders of bursae and tendons in shoulder region, unspecified; Fatty liver; GERD (gastroesophageal reflux disease); History of tetanus, diphtheria, and acellular pertussis booster vaccination (Tdap); Hypercholesteremia; Hypertrophy of prostate with urinary obstruction and other lower urinary tract symptoms (LUTS) (11/15/2013); Impotence of organic origin; Inguinal hernia (2014);  Insomnia, unspecified; Medial epicondylitis of elbow; Nonspecific elevation of levels of transaminase or lactic acid dehydrogenase (LDH); Orthostatic hypotension; Osteoporosis (11/15/2013); Other and unspecified hyperlipidemia; Tremor; Type II or unspecified type diabetes mellitus without mention of complication, not stated as uncontrolled; and Vertigo. Mr. Christie Flynn  has a past surgical history that includes orthopaedic (2010); heent; mohs procedure (Right); vasectomy; urological; urological; turp ([id number]); knee arthroscopy (Right, 2000); lumbar laminectomy (2/2012); shoulder arthroscopy (Right, 1995); tonsillectomy; cystoscopy,insert ureteral stent; and hernia repair (Bilateral, 2/3/14). He reports 2 heart stents placed at the same time (/30/17. He does not have a pacemaker. Social History/Living Environment:    Lives with spouse  Prior Level of Function/Work/Activity:      Independent   Dominant Side:         LEFT    Current Medications:    Current Outpatient Prescriptions:     nitroglycerin (NITROSTAT) 0.4 mg SL tablet, 0.4 mg by SubLINGual route., Disp: , Rfl:     sertraline (ZOLOFT) 50 mg tablet, Take 75 mg by mouth., Disp: , Rfl:     DRYSOL 20 % external solution, U UTD, Disp: , Rfl: 4    clonazePAM (KLONOPIN) 1 mg tablet, , Disp: , Rfl: 2    dextroamphetamine-amphetamine (ADDERALL) 20 mg tablet, , Disp: , Rfl: 0    atorvastatin (LIPITOR) 80 mg tablet, Take 80 mg by mouth., Disp: , Rfl:     omeprazole (PRILOSEC) 20 mg capsule, TK 1 C PO BID 30 MIN B WILIAM AND DINNER, Disp: , Rfl: 3    BRILINTA 90 mg tablet, , Disp: , Rfl: 11    triamterene-hydroCHLOROthiazide (DYAZIDE) 37.5-25 mg per capsule, Take 1 Cap by mouth daily. , Disp: 30 Cap, Rfl: 5    multivitamin (ONE A DAY) tablet, Take 1 Tab by mouth daily. , Disp: , Rfl:     B.infantis-B.ani-B.long-B.bifi (PROBIOTIC 4X) 10-15 mg TbEC, Take  by mouth., Disp: , Rfl:     turmeric root extract 500 mg cap, 1 per mouth daily, Disp: , Rfl:     propranolol (INDERAL) 10 mg tablet, TK 1 T PO BID FOR JITTERNESS, Disp: , Rfl: 0    cpap machine kit, by Does Not Apply route. autopap 5-15 cm, Disp: , Rfl:     potassium chloride SR (KLOR-CON 10) 10 mEq tablet, Take  by mouth., Disp: , Rfl:     magnesium gluconate 500 mg (27 mg  elemental) tablet, Take  by mouth two (2) times a day., Disp: , Rfl:     b complex vitamins tablet, Take  by mouth., Disp: , Rfl:     arginine 500 mg tablet, Take 500 mg by mouth., Disp: , Rfl:     omega-3 fatty acids-vitamin e (FISH OIL) 1,000 mg cap, Take 1 Cap by mouth., Disp: , Rfl:     co-enzyme Q-10 (CO Q-10) 100 mg capsule, Take 100 mg by mouth daily. , Disp: , Rfl:     Cholecalciferol, Vitamin D3, (VITAMIN D) 1,000 unit Cap, Take  by mouth daily. , Disp: , Rfl:     ASPIRIN 81 mg Tab, Take 2 Tabs by mouth daily. , Disp: , Rfl:    Date Last Reviewed:  12/13/2017   EXAMINATION:   Observation/Orthostatic Postural Assessment: Forward head   Palpation:          Increased pain and tone B suboccipitals and R UT, decreaased facet joint mobility B upper C/S with alteral glides and decreased lower C/S post glide  ROM:     C/S LR 60 deg, RR 55 deg, flex WNL, ext 50% limited with pain, B SB WNL      Strength:     mmt 5-/5 B UE  C/S endurance test < 10 sec with chin tuck/lift test due to weakness with no pain      Special Tests:          Spurlings negative, Transverve and Alar ligament negative, Cervical flexion with rotation WNL B rotation, CRLF test WNL B   Neurological Screen:        Sensation: light touch intact B UE  Functional Mobility:         Gait/Ambulation:  WNL  Balance:          SLB good B LE   Body Structures Involved:  1. Joints  2. Muscles Body Functions Affected:  1. Neuromusculoskeletal  2. Movement Related Activities and Participation Affected:  1. General Tasks and Demands  2. Self Care  3. Domestic Life   CLINICAL PRESENTATION:   CLINICAL DECISION MAKING:   Outcome Measure: Tool Used: Neck Disability Index (NDI)  Score:  Initial: 21/50  Most Recent: X/50 (Date: -- )   Interpretation of Score:  The Neck Disability Index is a revised form of the Oswestry Low Back Pain Index and is designed to measure the activities of daily living in person's with neck pain. Each section is scored on a 0-5 scale, 5 representing the greatest disability. The scores of each section are added together for a total score of 50. Score 0 1-10 11-20 21-30 31-40 41-49 50   Modifier CH CI CJ CK CL CM CN     ? Carrying, Moving, and Handling Objects:     - CURRENT STATUS: CK - 40%-59% impaired, limited or restricted    - GOAL STATUS: CI - 1%-19% impaired, limited or restricted    - D/C STATUS:  ---------------To be determined---------------      Medical Necessity:   · Patient is expected to demonstrate progress in range of motion and functional technique to increase independence with ROM. Reason for Services/Other Comments:  · Patient has been observed to have decreased ROM before, during or after an intervention. TREATMENT:   (In addition to Assessment/Re-Assessment sessions the following treatments were rendered)  THERAPEUTIC EXERCISE: (see grid for minutes):  Exercises per grid below to improve mobility and strength. Required moderate visual, verbal, manual and tactile cues to promote proper body alignment, promote proper body posture and promote proper body mechanics. Progressed resistance, range and repetitions as indicated. MANUAL THERAPY: (see grid for minutes): Joint mobilization and Soft tissue mobilization was utilized and necessary because of the patient's restricted joint motion, painful spasm, loss of articular motion and restricted motion of soft tissue. MODALITIES: (see grid for minutes): *  Electrical Stimulation Therapy (IFC) was provided with intensity adjusted throughout treatment to patient tolerance. to reduce pain       *  Cold Pack Therapy in order to provide analgesia and reduce inflammation and edema. *  Hot Pack Therapy in order to relieve muscle spasm.      Date: 11/29/17 12/1/17  (visit 2) 12/5/17  (visit 3) 12/7/17  (visit 4) 12/13/17  (visit 5)   Modalities: 15 min 15 mins 15 min 15 mins 15 mins   IFC and heat repeat repeat repeat repeat repeat                   Therapeutic Exercise: 10 mins 25 min 15 min 15 mins 30 mins   C/S rotation supine 10x 10x 10x 10x 10x supine   C/s chin tucks supine 10x5\" 10x 10x 10x Tuck with lift 10\"x3   UBE     3' each dir L4   Cable rows and PDs     25# 3x10]   Snow neptali on wall      10x   Seated scapula retraction 10x5\" 10x3 rows BTB 3x10 black TB 3x10    C/S ball on wall series   10x retraction 10x rot 10x each  10x 10x each    Quad protraction/retraction  10x 10x 10x 10x, Quad alt UE ro 10x each   Quad cat/cow  10x 10x 10x 10x   Proprioceptive Activities:                                Manual Therapy: 5 mins 15 mins 25 mins 15 mins 15 mins   FDN/MFR L rectus oblique , R capitus rectus Repeat prone and supine repeat repeat repeat           Therapeutic Activities:                                        HEP: Provided HEP handout on 11/29/17  Treatment/Session Assessment:  He  Is progressing well with improve ROM without pain. Con't with POC. · Pain/ Symptoms: Initial:   0/10  \"I haven't had neck or headache pain in a while\" Post Session:  0/10 No increased pain ·   Compliance with Program/Exercises: Will assess as treatment progresses. · Recommendations/Intent for next treatment session: \"Next visit will focus on advancements to more challenging activities\".   Total Treatment Duration:  PT Patient Time In/Time Out  Time In: 1015  Time Out: DEANDRA Lafleur

## 2017-12-15 ENCOUNTER — APPOINTMENT (OUTPATIENT)
Dept: PHYSICAL THERAPY | Age: 72
End: 2017-12-15
Payer: MEDICARE

## 2017-12-15 NOTE — PROGRESS NOTES
Kisha Orellana. : 1945 [id number] [facility],[address]  [address] [place] [address].  [phone]   [phone]        OUTPATIENT PHYSICAL THERAPY:Daily Note 2017        ICD-10: Treatment Diagnosis: Cervicalgia (M54.2)  Precautions/Allergies: Other medication and Red wine extract   Fall Risk Score: 1 (? 5 = High Risk)  MD Orders: Evaluation and treatment MEDICAL/REFERRING DIAGNOSIS:  Headache [R51]   DATE OF ONSET: Chronic 2016  REFERRING PHYSICIAN: George Vickers MD  RETURN PHYSICIAN APPOINTMENT: TBD     INITIAL ASSESSMENT:  Mr. Ghada Rosales presents with decreased C/S AROM, upper C/S facet joint restriction, and upper neck myofascial pain and restriction. His symptoms are consistent with cervicogenic headache and neck pain. He has no red flags. He will benefit from PT services to improve function and decrease pain. PROBLEM LIST (Impacting functional limitations):  1. Decreased Strength  2. Decreased ADL/Functional Activities  3. Increased Pain  4. Decreased Flexibility/Joint Mobility INTERVENTIONS PLANNED:  1. Cold  2. Cryotherapy  3. Electrical Stimulation  4. Heat  5. Home Exercise Program (HEP)  6. Manual Therapy  7. Neuromuscular Re-education/Strengthening  8. Range of Motion (ROM)  9. Therapeutic Activites  10. Therapeutic Exercise/Strengthening   TREATMENT PLAN:  Effective Dates: 17 TO 18. Frequency/Duration: 2 times a week for 12 weeks  GOALS: (Goals have been discussed and agreed upon with patient.)  Short-Term Functional Goals: Time Frame: 2 weeks  1. Patient will be independent with HEP without pain. 2. Patient will have improved posture to WNL decreasing pain when he is reading. Discharge Goals: Time Frame: 12 weeks  1. Patient will have improved NDI score ot < 5 point of disability allowing him improved community participation.   2. Patient will have improved C/S AROM to > 70 deg without increased pain allowing him to look over his shoulder. 3. Patient will have improved C/S extension to > 75% of motion allowing him to look up without pan < 0/10. Rehabilitation Potential For Stated Goals: Good                HISTORY:   History of Present Injury/Illness (Reason for Referral):  75 y/o M with c/o of right sided neck and right posterior to anterior head pain. He reports MRI in June 2017 that diagnosed hydrocephalus. He is under the care of a neurologist that said his symptoms are not caused by hydrocephaslus  He reports 3/10 pain at his best but he reports his pain was a 7/10 1 month ago when he bent over. He is familiar to clinic and has has PT for neck pain and [id number] earlier in the calender year of 2017. His symptoms of neck pain and ramshorn headache were remediated. He was d/c to his referring MD 2/2 to him continuing to have headache at the crown of his head that is only provoked with bending forward. He reported symptom relief with Advil. He reports fall off ladder > 3 months ago. Past Medical History/Comorbidities:   Mr. Mignon Lantigua  has a past medical history of ADD (attention deficit disorder); Anemia, unspecified; Aneurysm of unspecified site; Anxiety; Arthritis; Attention deficit disorder with hyperactivity(314.01); Benign paroxysmal vertigo; BPH (benign prostatic hypertrophy); Chronic prostatitis (11/15/2013); Colitis, enteritis, and gastroenteritis of presumed infectious origin; Depressive disorder, not elsewhere classified; Disorders of bursae and tendons in shoulder region, unspecified; Fatty liver; GERD (gastroesophageal reflux disease); History of tetanus, diphtheria, and acellular pertussis booster vaccination (Tdap); Hypercholesteremia; Hypertrophy of prostate with urinary obstruction and other lower urinary tract symptoms (LUTS) (11/15/2013); Impotence of organic origin; Inguinal hernia (2014);  Insomnia, unspecified; Medial epicondylitis of elbow; Nonspecific elevation of levels of transaminase or lactic acid dehydrogenase (LDH); Orthostatic hypotension; Osteoporosis (11/15/2013); Other and unspecified hyperlipidemia; Tremor; Type II or unspecified type diabetes mellitus without mention of complication, not stated as uncontrolled; and Vertigo. Mr. Melissa Lam  has a past surgical history that includes orthopaedic (2010); heent; mohs procedure (Right); vasectomy; urological; urological; turp ([id number]); knee arthroscopy (Right, 2000); lumbar laminectomy (2/2012); shoulder arthroscopy (Right, 1995); tonsillectomy; cystoscopy,insert ureteral stent; and hernia repair (Bilateral, 2/3/14). He reports 2 heart stents placed at the same time (/30/17. He does not have a pacemaker. Social History/Living Environment:    Lives with spouse  Prior Level of Function/Work/Activity:      Independent   Dominant Side:         LEFT    Current Medications:    Current Outpatient Prescriptions:     nitroglycerin (NITROSTAT) 0.4 mg SL tablet, 0.4 mg by SubLINGual route., Disp: , Rfl:     sertraline (ZOLOFT) 50 mg tablet, Take 75 mg by mouth., Disp: , Rfl:     DRYSOL 20 % external solution, U UTD, Disp: , Rfl: 4    clonazePAM (KLONOPIN) 1 mg tablet, , Disp: , Rfl: 2    dextroamphetamine-amphetamine (ADDERALL) 20 mg tablet, , Disp: , Rfl: 0    atorvastatin (LIPITOR) 80 mg tablet, Take 80 mg by mouth., Disp: , Rfl:     omeprazole (PRILOSEC) 20 mg capsule, TK 1 C PO BID 30 MIN B WILIAM AND DINNER, Disp: , Rfl: 3    BRILINTA 90 mg tablet, , Disp: , Rfl: 11    triamterene-hydroCHLOROthiazide (DYAZIDE) 37.5-25 mg per capsule, Take 1 Cap by mouth daily. , Disp: 30 Cap, Rfl: 5    multivitamin (ONE A DAY) tablet, Take 1 Tab by mouth daily. , Disp: , Rfl:     B.infantis-B.ani-B.long-B.bifi (PROBIOTIC 4X) 10-15 mg TbEC, Take  by mouth., Disp: , Rfl:     turmeric root extract 500 mg cap, 1 per mouth daily, Disp: , Rfl:     propranolol (INDERAL) 10 mg tablet, TK 1 T PO BID FOR JITTERNESS, Disp: , Rfl: 0    cpap machine kit, by Does Not Apply route. autopap 5-15 cm, Disp: , Rfl:     potassium chloride SR (KLOR-CON 10) 10 mEq tablet, Take  by mouth., Disp: , Rfl:     magnesium gluconate 500 mg (27 mg  elemental) tablet, Take  by mouth two (2) times a day., Disp: , Rfl:     b complex vitamins tablet, Take  by mouth., Disp: , Rfl:     arginine 500 mg tablet, Take 500 mg by mouth., Disp: , Rfl:     omega-3 fatty acids-vitamin e (FISH OIL) 1,000 mg cap, Take 1 Cap by mouth., Disp: , Rfl:     co-enzyme Q-10 (CO Q-10) 100 mg capsule, Take 100 mg by mouth daily. , Disp: , Rfl:     Cholecalciferol, Vitamin D3, (VITAMIN D) 1,000 unit Cap, Take  by mouth daily. , Disp: , Rfl:     ASPIRIN 81 mg Tab, Take 2 Tabs by mouth daily. , Disp: , Rfl:    Date Last Reviewed:  12/18/2017   EXAMINATION:   Observation/Orthostatic Postural Assessment: Forward head   Palpation:          Increased pain and tone B suboccipitals and R UT, decreaased facet joint mobility B upper C/S with alteral glides and decreased lower C/S post glide  ROM:     C/S LR 60 deg, RR 55 deg, flex WNL, ext 50% limited with pain, B SB WNL      Strength:     mmt 5-/5 B UE  C/S endurance test < 10 sec with chin tuck/lift test due to weakness with no pain      Special Tests:          Spurlings negative, Transverve and Alar ligament negative, Cervical flexion with rotation WNL B rotation, CRLF test WNL B   Neurological Screen:        Sensation: light touch intact B UE  Functional Mobility:         Gait/Ambulation:  WNL  Balance:          SLB good B LE   Body Structures Involved:  1. Joints  2. Muscles Body Functions Affected:  1. Neuromusculoskeletal  2. Movement Related Activities and Participation Affected:  1. General Tasks and Demands  2. Self Care  3. Domestic Life   CLINICAL PRESENTATION:   CLINICAL DECISION MAKING:   Outcome Measure: Tool Used: Neck Disability Index (NDI)  Score:  Initial: 21/50  Most Recent: X/50 (Date: -- )   Interpretation of Score:  The Neck Disability Index is a revised form of the Oswestry Low Back Pain Index and is designed to measure the activities of daily living in person's with neck pain. Each section is scored on a 0-5 scale, 5 representing the greatest disability. The scores of each section are added together for a total score of 50. Score 0 1-10 11-20 21-30 31-40 41-49 50   Modifier CH CI CJ CK CL CM CN     ? Carrying, Moving, and Handling Objects:     - CURRENT STATUS: CK - 40%-59% impaired, limited or restricted    - GOAL STATUS: CI - 1%-19% impaired, limited or restricted    - D/C STATUS:  ---------------To be determined---------------      Medical Necessity:   · Patient is expected to demonstrate progress in range of motion and functional technique to increase independence with ROM. Reason for Services/Other Comments:  · Patient has been observed to have decreased ROM before, during or after an intervention. TREATMENT:   (In addition to Assessment/Re-Assessment sessions the following treatments were rendered)  THERAPEUTIC EXERCISE: (see grid for minutes):  Exercises per grid below to improve mobility and strength. Required moderate visual, verbal, manual and tactile cues to promote proper body alignment, promote proper body posture and promote proper body mechanics. Progressed resistance, range and repetitions as indicated. MANUAL THERAPY: (see grid for minutes): Joint mobilization and Soft tissue mobilization was utilized and necessary because of the patient's restricted joint motion, painful spasm, loss of articular motion and restricted motion of soft tissue. MODALITIES: (see grid for minutes): *  Electrical Stimulation Therapy (IFC) was provided with intensity adjusted throughout treatment to patient tolerance. to reduce pain       *  Cold Pack Therapy in order to provide analgesia and reduce inflammation and edema. *  Hot Pack Therapy in order to relieve muscle spasm.      Date: 11/29/17 12/1/17  (visit 2) 12/5/17  (visit 3) 12/7/17  (visit 4) 12/13/17  (visit 5) 12/15/17  (visit 6)   Modalities: 15 min 15 mins 15 min 15 mins 15 mins    IFC and heat repeat repeat repeat repeat repeat                      Therapeutic Exercise: 10 mins 25 min 15 min 15 mins 30 mins 25 mins   C/S rotation supine 10x 10x 10x 10x 10x supine repeat   C/s chin tucks supine 10x5\" 10x 10x 10x Tuck with lift 10\"x3 repeat   UBE     3' each dir L4 repeat   Cable rows and PDs     25# 3x10] repeat   Snow neptali on wall      10x repeat   Seated scapula retraction 10x5\" 10x3 rows BTB 3x10 black TB 3x10     C/S ball on wall series   10x retraction 10x rot 10x each  10x 10x each  repeat   Quad protraction/retraction  10x 10x 10x 10x, Quad alt UE ro 10x each repeat   Quad cat/cow  10x 10x 10x 10x repeat   Proprioceptive Activities:                                    Manual Therapy: 5 mins 15 mins 25 mins 15 mins 15 mins 15 mins   FDN/MFR L rectus oblique , R capitus rectus Repeat prone and supine repeat repeat repeat repeat            Therapeutic Activities:                                             HEP: Provided HEP handout on 11/29/17  Treatment/Session Assessment:  He has good carryover with C/S AROM and symptoms. Decrease frequency to once a week and increased independence with HEP. Con't with POC. · Pain/ Symptoms: Initial:   0/10  \"I haven't had neck or headache pain in weeks. \" Post Session:  0/10 No increased pain ·   Compliance with Program/Exercises: Will assess as treatment progresses. · Recommendations/Intent for next treatment session: \"Next visit will focus on advancements to more challenging activities\".   Total Treatment Duration:  PT Patient Time In/Time Out  Time In: 1058  Time Out: 54 Hannah Aquino

## 2017-12-18 ENCOUNTER — HOSPITAL ENCOUNTER (OUTPATIENT)
Dept: PHYSICAL THERAPY | Age: 72
Discharge: HOME | End: 2017-12-18
Payer: MEDICARE

## 2017-12-18 PROCEDURE — 97140 MANUAL THERAPY 1/> REGIONS: CPT

## 2017-12-18 PROCEDURE — 97110 THERAPEUTIC EXERCISES: CPT

## 2017-12-20 ENCOUNTER — APPOINTMENT (OUTPATIENT)
Dept: PHYSICAL THERAPY | Age: 72
End: 2017-12-20
Payer: MEDICARE

## 2017-12-27 ENCOUNTER — HOSPITAL ENCOUNTER (OUTPATIENT)
Dept: PHYSICAL THERAPY | Age: 72
Discharge: HOME | End: 2017-12-27
Payer: MEDICARE

## 2017-12-27 PROCEDURE — 97014 ELECTRIC STIMULATION THERAPY: CPT

## 2017-12-27 PROCEDURE — G8982 BODY POS GOAL STATUS: HCPCS

## 2017-12-27 PROCEDURE — G8981 BODY POS CURRENT STATUS: HCPCS

## 2017-12-27 PROCEDURE — 97140 MANUAL THERAPY 1/> REGIONS: CPT

## 2017-12-27 PROCEDURE — 97110 THERAPEUTIC EXERCISES: CPT

## 2017-12-27 NOTE — PROGRESS NOTES
Lillie Handley. : 1945 [address] [place] [address],[address]  [address] [place] [address]  [phone]   [phone]        OUTPATIENT PHYSICAL THERAPY:Daily Note and Progress Report 2017        ICD-10: Treatment Diagnosis: Cervicalgia (M54.2)  Precautions/Allergies: Other medication and Red wine extract   Fall Risk Score: 1 (? 5 = High Risk)  MD Orders: Evaluation and treatment MEDICAL/REFERRING DIAGNOSIS:  Headache [R51]   DATE OF ONSET: Chronic 2016  REFERRING PHYSICIAN: Austyn Curry MD  RETURN PHYSICIAN APPOINTMENT: TBD     INITIAL ASSESSMENT:  Mr. Rickie Ford presents with decreased C/S AROM, upper C/S facet joint restriction, and upper neck myofascial pain and restriction. His symptoms are consistent with cervicogenic headache and neck pain. He has no red flags. He will benefit from PT services to improve function and decrease pain. PROBLEM LIST (Impacting functional limitations):  1. Decreased Strength  2. Decreased ADL/Functional Activities  3. Increased Pain  4. Decreased Flexibility/Joint Mobility INTERVENTIONS PLANNED:  1. Cold  2. Cryotherapy  3. Electrical Stimulation  4. Heat  5. Home Exercise Program (HEP)  6. Manual Therapy  7. Neuromuscular Re-education/Strengthening  8. Range of Motion (ROM)  9. Therapeutic Activites  10. Therapeutic Exercise/Strengthening   TREATMENT PLAN:  Effective Dates: 17 TO 18. Frequency/Duration: 2 times a week for 12 weeks  GOALS: (Goals have been discussed and agreed upon with patient.)  Short-Term Functional Goals: Time Frame: 2 weeks  1. Patient will be independent with HEP without pain.(MET)  2. Patient will have improved posture to WNL decreasing pain when he is reading. (MET)  Discharge Goals: Time Frame: 12 weeks  1. Patient will have improved NDI score ot < 5 point of disability allowing him improved community participation.(progressing)  2.  Patient will have improved C/S AROM to > 70 deg without increased pain allowing him to look over his shoulder.(progressing)   3. Patient will have improved C/S extension to > 75% of motion allowing him to look up without pan < 0/10. (progressing)  Rehabilitation Potential For Stated Goals: Good                HISTORY:   History of Present Injury/Illness (Reason for Referral):  75 y/o M with c/o of right sided neck and right posterior to anterior head pain. He reports MRI in June 2017 that diagnosed hydrocephalus. He is under the care of a neurologist that said his symptoms are not caused by hydrocephaslus  He reports 3/10 pain at his best but he reports his pain was a 7/10 1 month ago when he bent over. He is familiar to clinic and has has PT for neck pain and [id number] earlier in the calender year of 2017. His symptoms of neck pain and ramshorn headache were remediated. He was d/c to his referring MD 2/2 to him continuing to have headache at the crown of his head that is only provoked with bending forward. He reported symptom relief with Advil. He reports fall off ladder > 3 months ago. Past Medical History/Comorbidities:   Mr. Leda Thomas  has a past medical history of ADD (attention deficit disorder); Anemia, unspecified; Aneurysm of unspecified site; Anxiety; Arthritis; Attention deficit disorder with hyperactivity(314.01); Benign paroxysmal vertigo; BPH (benign prostatic hypertrophy); Chronic prostatitis (11/15/2013); Colitis, enteritis, and gastroenteritis of presumed infectious origin; Depressive disorder, not elsewhere classified; Disorders of bursae and tendons in shoulder region, unspecified; Fatty liver; GERD (gastroesophageal reflux disease); History of tetanus, diphtheria, and acellular pertussis booster vaccination (Tdap); Hypercholesteremia; Hypertrophy of prostate with urinary obstruction and other lower urinary tract symptoms (LUTS) (11/15/2013); Impotence of organic origin; Inguinal hernia (2014);  Insomnia, unspecified; Medial epicondylitis of elbow; Nonspecific elevation of levels of transaminase or lactic acid dehydrogenase (LDH); Orthostatic hypotension; Osteoporosis (11/15/2013); Other and unspecified hyperlipidemia; Tremor; Type II or unspecified type diabetes mellitus without mention of complication, not stated as uncontrolled; and Vertigo. Mr. Eliana Louise  has a past surgical history that includes hx orthopaedic (2010); hx heent; hx mohs procedure (Right); hx vasectomy; hx urological; hx urological; hx turp ([id number]); hx knee arthroscopy (Right, 2000); hx lumbar laminectomy (2/2012); hx shoulder arthroscopy (Right, 1995); hx tonsillectomy; pr cystoscopy,insert ureteral stent; and hx hernia repair (Bilateral, 2/3/14). He reports 2 heart stents placed at the same time (/30/17. He does not have a pacemaker. Social History/Living Environment:    Lives with spouse  Prior Level of Function/Work/Activity:      Independent   Dominant Side:         LEFT    Current Medications:    Current Outpatient Prescriptions:     nitroglycerin (NITROSTAT) 0.4 mg SL tablet, 0.4 mg by SubLINGual route., Disp: , Rfl:     sertraline (ZOLOFT) 50 mg tablet, Take 75 mg by mouth., Disp: , Rfl:     DRYSOL 20 % external solution, U UTD, Disp: , Rfl: 4    clonazePAM (KLONOPIN) 1 mg tablet, , Disp: , Rfl: 2    dextroamphetamine-amphetamine (ADDERALL) 20 mg tablet, , Disp: , Rfl: 0    atorvastatin (LIPITOR) 80 mg tablet, Take 80 mg by mouth., Disp: , Rfl:     omeprazole (PRILOSEC) 20 mg capsule, TK 1 C PO BID 30 MIN B WILIAM AND DINNER, Disp: , Rfl: 3    BRILINTA 90 mg tablet, , Disp: , Rfl: 11    triamterene-hydroCHLOROthiazide (DYAZIDE) 37.5-25 mg per capsule, Take 1 Cap by mouth daily. , Disp: 30 Cap, Rfl: 5    multivitamin (ONE A DAY) tablet, Take 1 Tab by mouth daily. , Disp: , Rfl:     B.infantis-B.ani-B.long-B.bifi (PROBIOTIC 4X) 10-15 mg TbEC, Take  by mouth., Disp: , Rfl:     turmeric root extract 500 mg cap, 1 per mouth daily, Disp: , Rfl:     propranolol (INDERAL) 10 mg tablet, TK 1 T PO BID FOR JITTERNESS, Disp: , Rfl: 0    cpap machine kit, by Does Not Apply route. autopap 5-15 cm, Disp: , Rfl:     potassium chloride SR (KLOR-CON 10) 10 mEq tablet, Take  by mouth., Disp: , Rfl:     magnesium gluconate 500 mg (27 mg  elemental) tablet, Take  by mouth two (2) times a day., Disp: , Rfl:     b complex vitamins tablet, Take  by mouth., Disp: , Rfl:     arginine 500 mg tablet, Take 500 mg by mouth., Disp: , Rfl:     omega-3 fatty acids-vitamin e (FISH OIL) 1,000 mg cap, Take 1 Cap by mouth., Disp: , Rfl:     co-enzyme Q-10 (CO Q-10) 100 mg capsule, Take 100 mg by mouth daily. , Disp: , Rfl:     Cholecalciferol, Vitamin D3, (VITAMIN D) 1,000 unit Cap, Take  by mouth daily. , Disp: , Rfl:     ASPIRIN 81 mg Tab, Take 2 Tabs by mouth daily. , Disp: , Rfl:    Date Last Reviewed:  12/27/2017   EXAMINATION:   Observation/Orthostatic Postural Assessment: Forward head   Palpation:          Increased pain and tone B suboccipitals and R UT, decreaased facet joint mobility B upper C/S with alteral glides and decreased lower C/S post glide  ROM:     C/S LR 60 deg, RR 55 deg, flex WNL, ext 50% limited with pain, B SB WNL   12/27/17   LR 65 deg RR 65 deg  Extension 60%    Strength:     mmt 5-/5 B UE  C/S endurance test < 10 sec with chin tuck/lift test due to weakness with no pain   C/S tuck and lift 30 sec    Special Tests:          Spurlings negative, Transverve and Alar ligament negative, Cervical flexion with rotation WNL B rotation, CRLF test WNL B   Neurological Screen:        Sensation: light touch intact B UE  Functional Mobility:         Gait/Ambulation:  WNL  Balance:          SLB good B LE   Body Structures Involved:  1. Joints  2. Muscles Body Functions Affected:  1. Neuromusculoskeletal  2. Movement Related Activities and Participation Affected:  1. General Tasks and Demands  2. Self Care  3.  Domestic Life   CLINICAL PRESENTATION:   CLINICAL DECISION MAKING:   Outcome Measure: Tool Used: Neck Disability Index (NDI)  Score:  Initial: 21/50  Most Recent: 6/50 (Date: 12/27/17 )   Interpretation of Score: The Neck Disability Index is a revised form of the Oswestry Low Back Pain Index and is designed to measure the activities of daily living in person's with neck pain. Each section is scored on a 0-5 scale, 5 representing the greatest disability. The scores of each section are added together for a total score of 50. Score 0 1-10 11-20 21-30 31-40 41-49 50   Modifier CH CI CJ CK CL CM CN     ? Carrying, Moving, and Handling Objects:     - CURRENT STATUS: CI - 1%-19% impaired, limited or restricted    - GOAL STATUS: CH - 0% impaired, limited or restricted    - D/C STATUS:  ---------------To be determined---------------      Medical Necessity:   · Patient is expected to demonstrate progress in range of motion and functional technique to increase independence with ROM. Reason for Services/Other Comments:  · Patient has been observed to have decreased ROM before, during or after an intervention. TREATMENT:   (In addition to Assessment/Re-Assessment sessions the following treatments were rendered)  THERAPEUTIC EXERCISE: (see grid for minutes):  Exercises per grid below to improve mobility and strength. Required moderate visual, verbal, manual and tactile cues to promote proper body alignment, promote proper body posture and promote proper body mechanics. Progressed resistance, range and repetitions as indicated. MANUAL THERAPY: (see grid for minutes): Joint mobilization and Soft tissue mobilization was utilized and necessary because of the patient's restricted joint motion, painful spasm, loss of articular motion and restricted motion of soft tissue. MODALITIES: (see grid for minutes): *  Electrical Stimulation Therapy (IFC) was provided with intensity adjusted throughout treatment to patient tolerance.  to reduce pain       *  Cold Pack Therapy in order to provide analgesia and reduce inflammation and edema. *  Hot Pack Therapy in order to relieve muscle spasm. Date: 11/29/17 12/1/17  (visit 2) 12/5/17  (visit 3) 12/7/17  (visit 4) 12/13/17  (visit 5) 12/15/17  (visit 6) 12/27/17  (visit 7)PN and g codes   Modalities: 15 min 15 mins 15 min 15 mins 15 mins  15 mins   IFC and heat repeat repeat repeat repeat repeat  repeat                       Therapeutic Exercise: 10 mins 25 min 15 min 15 mins 30 mins 25 mins 15 mins   C/S rotation supine 10x 10x 10x 10x 10x supine repeat HEP   C/s chin tucks supine 10x5\" 10x 10x 10x Tuck with lift 10\"x3 repeat HEP   UBE     3' each dir L4 repeat 3' each dir L4   Cable rows and PDs     25# 3x10] repeat repeat   Snow neptali on wall      10x repeat repeat   Seated scapula retraction 10x5\" 10x3 rows BTB 3x10 black TB 3x10   R UT stretch 3x30 sec holds   C/S ball on wall series   10x retraction 10x rot 10x each  10x 10x each  repeat repeat   Quad protraction/retraction  10x 10x 10x 10x, Quad alt UE ro 10x each repeat 10x each   Quad cat/cow  10x 10x 10x 10x repeat 10x each   Proprioceptive Activities:                                        Manual Therapy: 5 mins 15 mins 25 mins 15 mins 15 mins 15 mins 25 mins   FDN/MFR L rectus oblique , R capitus rectus Repeat prone and supine repeat repeat repeat repeat repeat             Therapeutic Activities:                                                  HEP: Provided HEP handout on 11/29/17  Treatment/Session Assessment:  He had increased muscle tone R UT and suboccipitals that improved after FDN and MFR. Con't with POC. · Pain/ Symptoms: Initial:   0/10  \"I only had 1 HA after golfing but I got it to go away\" Post Session:  0/10 No increased pain ·   Compliance with Program/Exercises: Will assess as treatment progresses. · Recommendations/Intent for next treatment session: \"Next visit will focus on advancements to more challenging activities\".   Total Treatment Duration:  PT Patient Time In/Time Out  Time In: 1105  Time Out: [address] Rani Moore

## 2017-12-29 ENCOUNTER — APPOINTMENT (OUTPATIENT)
Dept: PHYSICAL THERAPY | Age: 72
End: 2017-12-29
Payer: MEDICARE

## 2018-01-02 NOTE — PROGRESS NOTES
Loren Escobar. : 1945 [id number] [facility]  [address] [place] [address].  [phone]   [phone]        OUTPATIENT PHYSICAL THERAPY:Daily Note 1/3/2018        ICD-10: Treatment Diagnosis: Cervicalgia (M54.2)  Precautions/Allergies: Other medication and Red wine extract   Fall Risk Score: 1 (? 5 = High Risk)  MD Orders: Evaluation and treatment MEDICAL/REFERRING DIAGNOSIS:  Headache [R51]   DATE OF ONSET: Chronic   REFERRING PHYSICIAN: Christiano Alvarez MD  RETURN PHYSICIAN APPOINTMENT: TBD     INITIAL ASSESSMENT:  Mr. Jeff Kumar presents with decreased C/S AROM, upper C/S facet joint restriction, and upper neck myofascial pain and restriction. His symptoms are consistent with cervicogenic headache and neck pain. He has no red flags. He will benefit from PT services to improve function and decrease pain. PROBLEM LIST (Impacting functional limitations):  1. Decreased Strength  2. Decreased ADL/Functional Activities  3. Increased Pain  4. Decreased Flexibility/Joint Mobility INTERVENTIONS PLANNED:  1. Cold  2. Cryotherapy  3. Electrical Stimulation  4. Heat  5. Home Exercise Program (HEP)  6. Manual Therapy  7. Neuromuscular Re-education/Strengthening  8. Range of Motion (ROM)  9. Therapeutic Activites  10. Therapeutic Exercise/Strengthening   TREATMENT PLAN:  Effective Dates: 17 TO 18. Frequency/Duration: 2 times a week for 12 weeks  GOALS: (Goals have been discussed and agreed upon with patient.)  Short-Term Functional Goals: Time Frame: 2 weeks  1. Patient will be independent with HEP without pain.(MET)  2. Patient will have improved posture to WNL decreasing pain when he is reading. (MET)  Discharge Goals: Time Frame: 12 weeks  1. Patient will have improved NDI score ot < 5 point of disability allowing him improved community participation.(progressing)  2.  Patient will have improved C/S AROM to > 70 deg without increased pain allowing him to look over his shoulder.(progressing)   3. Patient will have improved C/S extension to > 75% of motion allowing him to look up without pan < 0/10. (progressing)  Rehabilitation Potential For Stated Goals: Good                HISTORY:   History of Present Injury/Illness (Reason for Referral):  73 y/o M with c/o of right sided neck and right posterior to anterior head pain. He reports MRI in June 2017 that diagnosed hydrocephalus. He is under the care of a neurologist that said his symptoms are not caused by hydrocephaslus  He reports 3/10 pain at his best but he reports his pain was a 7/10 1 month ago when he bent over. He is familiar to clinic and has has PT for neck pain and [facility] earlier in the calender year of 2017. His symptoms of neck pain and ramshorn headache were remediated. He was d/c to his referring MD 2/2 to him continuing to have headache at the crown of his head that is only provoked with bending forward. He reported symptom relief with Advil. He reports fall off ladder > 3 months ago. Past Medical History/Comorbidities:   Mr. Patience Yeboah  has a past medical history of ADD (attention deficit disorder); Anemia, unspecified; Aneurysm of unspecified site; Anxiety; Arthritis; Attention deficit disorder with hyperactivity(314.01); Benign paroxysmal vertigo; BPH (benign prostatic hypertrophy); Chronic prostatitis (11/15/2013); Colitis, enteritis, and gastroenteritis of presumed infectious origin; Depressive disorder, not elsewhere classified; Disorders of bursae and tendons in shoulder region, unspecified; Fatty liver; GERD (gastroesophageal reflux disease); History of tetanus, diphtheria, and acellular pertussis booster vaccination (Tdap); Hypercholesteremia; Hypertrophy of prostate with urinary obstruction and other lower urinary tract symptoms (LUTS) (11/15/2013); Impotence of organic origin; Inguinal hernia (2014);  Insomnia, unspecified; Medial epicondylitis of elbow; Nonspecific elevation of levels of transaminase or lactic acid dehydrogenase (LDH); Orthostatic hypotension; Osteoporosis (11/15/2013); Other and unspecified hyperlipidemia; Tremor; Type II or unspecified type diabetes mellitus without mention of complication, not stated as uncontrolled; and Vertigo. Mr. Naila Yates  has a past surgical history that includes hx orthopaedic (2010); hx heent; hx mohs procedure (Right); hx vasectomy; hx urological; hx urological; hx turp ([id number]); hx knee arthroscopy (Right, 2000); hx lumbar laminectomy (2/2012); hx shoulder arthroscopy (Right, 1995); hx tonsillectomy; pr cystoscopy,insert ureteral stent; and hx hernia repair (Bilateral, 2/3/14). He reports 2 heart stents placed at the same time (/30/17. He does not have a pacemaker. Social History/Living Environment:    Lives with spouse  Prior Level of Function/Work/Activity:      Independent   Dominant Side:         LEFT    Current Medications:    Current Outpatient Prescriptions:     nitroglycerin (NITROSTAT) 0.4 mg SL tablet, 0.4 mg by SubLINGual route., Disp: , Rfl:     sertraline (ZOLOFT) 50 mg tablet, Take 75 mg by mouth., Disp: , Rfl:     DRYSOL 20 % external solution, U UTD, Disp: , Rfl: 4    clonazePAM (KLONOPIN) 1 mg tablet, , Disp: , Rfl: 2    dextroamphetamine-amphetamine (ADDERALL) 20 mg tablet, , Disp: , Rfl: 0    atorvastatin (LIPITOR) 80 mg tablet, Take 80 mg by mouth., Disp: , Rfl:     omeprazole (PRILOSEC) 20 mg capsule, TK 1 C PO BID 30 MIN B WILIAM AND DINNER, Disp: , Rfl: 3    BRILINTA 90 mg tablet, , Disp: , Rfl: 11    triamterene-hydroCHLOROthiazide (DYAZIDE) 37.5-25 mg per capsule, Take 1 Cap by mouth daily. , Disp: 30 Cap, Rfl: 5    multivitamin (ONE A DAY) tablet, Take 1 Tab by mouth daily. , Disp: , Rfl:     B.infantis-B.ani-B.long-B.bifi (PROBIOTIC 4X) 10-15 mg TbEC, Take  by mouth., Disp: , Rfl:     turmeric root extract 500 mg cap, 1 per mouth daily, Disp: , Rfl:     propranolol (INDERAL) 10 mg tablet, TK 1 T PO BID FOR JITTERNESS, Disp: , Rfl: 0    cpap machine kit, by Does Not Apply route. autopap 5-15 cm, Disp: , Rfl:     potassium chloride SR (KLOR-CON 10) 10 mEq tablet, Take  by mouth., Disp: , Rfl:     magnesium gluconate 500 mg (27 mg  elemental) tablet, Take  by mouth two (2) times a day., Disp: , Rfl:     b complex vitamins tablet, Take  by mouth., Disp: , Rfl:     arginine 500 mg tablet, Take 500 mg by mouth., Disp: , Rfl:     omega-3 fatty acids-vitamin e (FISH OIL) 1,000 mg cap, Take 1 Cap by mouth., Disp: , Rfl:     co-enzyme Q-10 (CO Q-10) 100 mg capsule, Take 100 mg by mouth daily. , Disp: , Rfl:     Cholecalciferol, Vitamin D3, (VITAMIN D) 1,000 unit Cap, Take  by mouth daily. , Disp: , Rfl:     ASPIRIN 81 mg Tab, Take 2 Tabs by mouth daily. , Disp: , Rfl:    Date Last Reviewed:  1/3/2018   EXAMINATION:   Observation/Orthostatic Postural Assessment: Forward head   Palpation:          Increased pain and tone B suboccipitals and R UT, decreaased facet joint mobility B upper C/S with alteral glides and decreased lower C/S post glide  ROM:     C/S LR 60 deg, RR 55 deg, flex WNL, ext 50% limited with pain, B SB WNL   12/27/17   LR 65 deg RR 65 deg  Extension 60%    Strength:     mmt 5-/5 B UE  C/S endurance test < 10 sec with chin tuck/lift test due to weakness with no pain   C/S tuck and lift 30 sec    Special Tests:          Spurlings negative, Transverve and Alar ligament negative, Cervical flexion with rotation WNL B rotation, CRLF test WNL B   Neurological Screen:        Sensation: light touch intact B UE  Functional Mobility:         Gait/Ambulation:  WNL  Balance:          SLB good B LE   Body Structures Involved:  1. Joints  2. Muscles Body Functions Affected:  1. Neuromusculoskeletal  2. Movement Related Activities and Participation Affected:  1. General Tasks and Demands  2. Self Care  3. Domestic Life   CLINICAL PRESENTATION:   CLINICAL DECISION MAKING:   Outcome Measure:    Tool Used: Neck Disability Index (NDI)  Score:  Initial: 21/50  Most Recent: 6/50 (Date: 12/27/17 )   Interpretation of Score: The Neck Disability Index is a revised form of the Oswestry Low Back Pain Index and is designed to measure the activities of daily living in person's with neck pain. Each section is scored on a 0-5 scale, 5 representing the greatest disability. The scores of each section are added together for a total score of 50. Score 0 1-10 11-20 21-30 31-40 41-49 50   Modifier CH CI CJ CK CL CM CN     ? Carrying, Moving, and Handling Objects:     - CURRENT STATUS: CI - 1%-19% impaired, limited or restricted    - GOAL STATUS: CH - 0% impaired, limited or restricted    - D/C STATUS:  ---------------To be determined---------------      Medical Necessity:   · Patient is expected to demonstrate progress in range of motion and functional technique to increase independence with ROM. Reason for Services/Other Comments:  · Patient has been observed to have decreased ROM before, during or after an intervention. TREATMENT:   (In addition to Assessment/Re-Assessment sessions the following treatments were rendered)  THERAPEUTIC EXERCISE: (see grid for minutes):  Exercises per grid below to improve mobility and strength. Required moderate visual, verbal, manual and tactile cues to promote proper body alignment, promote proper body posture and promote proper body mechanics. Progressed resistance, range and repetitions as indicated. MANUAL THERAPY: (see grid for minutes): Joint mobilization and Soft tissue mobilization was utilized and necessary because of the patient's restricted joint motion, painful spasm, loss of articular motion and restricted motion of soft tissue. MODALITIES: (see grid for minutes): *  Electrical Stimulation Therapy (IFC) was provided with intensity adjusted throughout treatment to patient tolerance.  to reduce pain       *  Cold Pack Therapy in order to provide analgesia and reduce inflammation and edema. *  Hot Pack Therapy in order to relieve muscle spasm. Date: 11/29/17 12/1/17  (visit 2) 12/5/17  (visit 3) 12/7/17  (visit 4) 12/13/17  (visit 5) 12/15/17  (visit 6) 12/27/17  (visit 7)PN and g codes 1/3/18  (visit 8)   Modalities: 15 min 15 mins 15 min 15 mins 15 mins  15 mins    IFC and heat repeat repeat repeat repeat repeat  repeat                          Therapeutic Exercise: 10 mins 25 min 15 min 15 mins 30 mins 25 mins 15 mins 15 min   C/S rotation supine 10x 10x 10x 10x 10x supine repeat HEP    C/s chin tucks supine 10x5\" 10x 10x 10x Tuck with lift 10\"x3 repeat HEP    UBE     3' each dir L4 repeat 3' each dir L4    Cable rows and PDs     25# 3x10] repeat repeat Repeat 25#   Snow neptali on wall      10x repeat repeat    Seated scapula retraction 10x5\" 10x3 rows BTB 3x10 black TB 3x10   R UT stretch 3x30 sec holds    C/S ball on wall series   10x retraction 10x rot 10x each  10x 10x each  repeat repeat repeat   Quad protraction/retraction  10x 10x 10x 10x, Quad alt UE ro 10x each repeat 10x each repeat   Quad cat/cow  10x 10x 10x 10x repeat 10x each repeat   Proprioceptive Activities:                                            Manual Therapy: 5 mins 15 mins 25 mins 15 mins 15 mins 15 mins 25 mins 30 mins   FDN/MFR L rectus oblique , R capitus rectus Repeat prone and supine repeat repeat repeat repeat repeat repeat              Therapeutic Activities:                                                       HEP: Provided HEP handout on 11/29/17  Treatment/Session Assessment:  He was educated to maintain chin tuck and C/S elongation with full body exercise to avoid straining his C/S with working out. Recommend that he continue with C/S HEP and improved C/S posture. He may return to PT if he has a relapse of headache or neck pain symtpoms. He was educated to f/u with neurologist for any red flag symptoms.      · Pain/ Symptoms: Initial:   0/10  \"I strained my neck a little from working out. I think it will get better\" Post Session:  0/10 No increased pain ·   Compliance with Program/Exercises: Will assess as treatment progresses. · Recommendations/Intent for next treatment session: \"Next visit will focus on advancements to more challenging activities\".   Total Treatment Duration:  PT Patient Time In/Time Out  Time In: 1005  Time Out: [facility], PT

## 2018-01-03 ENCOUNTER — HOSPITAL ENCOUNTER (OUTPATIENT)
Dept: PHYSICAL THERAPY | Age: 73
Discharge: HOME | End: 2018-01-03
Payer: MEDICARE

## 2018-01-03 PROCEDURE — 97140 MANUAL THERAPY 1/> REGIONS: CPT

## 2018-01-03 PROCEDURE — 97110 THERAPEUTIC EXERCISES: CPT

## 2018-01-04 ENCOUNTER — APPOINTMENT (OUTPATIENT)
Dept: PHYSICAL THERAPY | Age: 73
End: 2018-01-04
Payer: MEDICARE

## 2018-02-20 NOTE — THERAPY DISCHARGE
Tone Magaña   (:1945) [address] [facility],[address]  [address] [place] [address].  [phone]   [phone]           PHYSICIAN COMMUNICATION and discontinuation summary    REFERRING PHYSICIAN: Fuson, Andrena Oppenheim, MD  Return Physician Appointment: TBD  MEDICAL/REFERRING DIAGNOSIS:  · Headache [R51]  ATTENDANCE: Tone Monroe has attended 8 out of 8 visits, with 0 cancellation(s) and 0 no shows. ASSESSMENT:  DATE: 2018    PROGRESS: Tone Monroe made excellent progress toward goals, has no pain or headache, and is independent with his HEP.      RECOMMENDATIONS: Discharge to independent with HEP    Thank you for this referral,  Brian Higginbotham PT     Referring Physician Signature: Boby Clemente MD          Date

## 2018-03-12 ENCOUNTER — HOSPITAL ENCOUNTER (OUTPATIENT)
Dept: SURGERY | Age: 73
Discharge: HOME | End: 2018-03-12
Payer: MEDICARE

## 2018-03-12 VITALS
BODY MASS INDEX: 22.52 KG/M2 | OXYGEN SATURATION: 98 % | DIASTOLIC BLOOD PRESSURE: 73 MMHG | HEART RATE: 71 BPM | WEIGHT: 148.6 LBS | TEMPERATURE: 96.2 F | HEIGHT: 68 IN | SYSTOLIC BLOOD PRESSURE: 128 MMHG

## 2018-03-12 LAB
HGB BLD-MCNC: 13.1 G/DL (ref 13.6–17.2)
POTASSIUM SERPL-SCNC: 4.9 MMOL/L (ref 3.5–5.1)

## 2018-03-12 PROCEDURE — 84132 ASSAY OF SERUM POTASSIUM: CPT | Performed by: ANESTHESIOLOGY

## 2018-03-12 PROCEDURE — 85018 HEMOGLOBIN: CPT | Performed by: ANESTHESIOLOGY

## 2018-03-12 RX ORDER — DEXTROAMPHETAMINE SACCHARATE, AMPHETAMINE ASPARTATE, DEXTROAMPHETAMINE SULFATE AND AMPHETAMINE SULFATE 2.5; 2.5; 2.5; 2.5 MG/1; MG/1; MG/1; MG/1
10 TABLET ORAL DAILY
COMMUNITY
End: 2019-04-01

## 2018-03-12 RX ORDER — CEPHRADINE 500 MG
200 CAPSULE ORAL DAILY
COMMUNITY
End: 2018-11-28

## 2018-03-12 RX ORDER — NITROGLYCERIN 0.4 MG/1
0.4 TABLET SUBLINGUAL
COMMUNITY
End: 2019-12-26

## 2018-03-12 NOTE — PERIOP NOTES
Patient verified name, , and surgery as listed in [facility]. Type 1B surgery, walk in assessment complete. Labs per surgeon: none needed. Labs per anesthesia protocol: HGB, K+; results pending. EKG: not needed per [facility] protocols. Had 2 stents placed on 17. By surgery date patient will be six months post stent placement and per anesthesia protocol patient may proceed with surgery. Per Dr. Lela Echeverria and anesthesia protocol patient does not need to hold Brilinta for surgery. [id number] [facility] cardiology records available in care everywhere for reference if needed. Hibiclens and instructions given per hospital policy. Patient provided with and instructed on educational handouts including Guide to Surgery, Pain Management, Hand Hygiene, Blood Transfusion Education, and [place] [facility] Brochure. Patient answered medical/surgical history questions at their best of ability. All prior to admission medications documented in [facility]. Original medication prescription bottles not visualized during patient appointment. Patient instructed to hold all vitamins 7 days prior to surgery and NSAIDS 5 days prior to surgery, patient verbalized understanding. Medications to be held: vitamins. Patient instructed to continue previous medications as prescribed prior to surgery and to take the following medications the day of surgery according to anesthesia guidelines with a small sip of water: omeprazole, propranolol if needed. Instructed to bring c-pap dos. Patient teach back successful and patient demonstrates knowledge of instructions.

## 2018-03-18 ENCOUNTER — ANESTHESIA EVENT (OUTPATIENT)
Dept: SURGERY | Age: 73
End: 2018-03-18
Payer: MEDICARE

## 2018-03-18 RX ORDER — SODIUM CHLORIDE 0.9 % (FLUSH) 0.9 %
5-10 SYRINGE (ML) INJECTION EVERY 8 HOURS
Status: CANCELLED | OUTPATIENT
Start: 2018-03-18

## 2018-03-18 RX ORDER — SODIUM CHLORIDE 0.9 % (FLUSH) 0.9 %
5-10 SYRINGE (ML) INJECTION AS NEEDED
Status: CANCELLED | OUTPATIENT
Start: 2018-03-18

## 2018-03-19 ENCOUNTER — ANESTHESIA (OUTPATIENT)
Dept: SURGERY | Age: 73
End: 2018-03-19
Payer: MEDICARE

## 2018-03-19 ENCOUNTER — HOSPITAL ENCOUNTER (OUTPATIENT)
Age: 73
Setting detail: OUTPATIENT SURGERY
Discharge: HOME | End: 2018-03-19
Attending: OTOLARYNGOLOGY | Admitting: OTOLARYNGOLOGY
Payer: MEDICARE

## 2018-03-19 VITALS
OXYGEN SATURATION: 95 % | HEIGHT: 68 IN | BODY MASS INDEX: 22.52 KG/M2 | SYSTOLIC BLOOD PRESSURE: 111 MMHG | DIASTOLIC BLOOD PRESSURE: 55 MMHG | RESPIRATION RATE: 16 BRPM | HEART RATE: 60 BPM | WEIGHT: 148.6 LBS | TEMPERATURE: 97.7 F

## 2018-03-19 PROCEDURE — 77030018836 HC SOL IRR NACL ICUM -A: Performed by: OTOLARYNGOLOGY

## 2018-03-19 PROCEDURE — 74011000250 HC RX REV CODE- 250: Performed by: OTOLARYNGOLOGY

## 2018-03-19 PROCEDURE — 74011250636 HC RX REV CODE- 250/636: Performed by: ANESTHESIOLOGY

## 2018-03-19 PROCEDURE — 76210000006 HC OR PH I REC 0.5 TO 1 HR: Performed by: OTOLARYNGOLOGY

## 2018-03-19 PROCEDURE — 74011000250 HC RX REV CODE- 250: Performed by: ANESTHESIOLOGY

## 2018-03-19 PROCEDURE — 77030022386 HC TU EAR TYMPNOST JDMD -B: Performed by: OTOLARYNGOLOGY

## 2018-03-19 PROCEDURE — C1726 CATH, BAL DIL, NON-VASCULAR: HCPCS | Performed by: OTOLARYNGOLOGY

## 2018-03-19 PROCEDURE — 77030008477 HC STYL SATN SLP COVD -A: Performed by: ANESTHESIOLOGY

## 2018-03-19 PROCEDURE — 74011000250 HC RX REV CODE- 250

## 2018-03-19 PROCEDURE — 74011250636 HC RX REV CODE- 250/636

## 2018-03-19 PROCEDURE — 76060000032 HC ANESTHESIA 0.5 TO 1 HR: Performed by: OTOLARYNGOLOGY

## 2018-03-19 PROCEDURE — 77030008703 HC TU ET UNCUF COVD -A: Performed by: ANESTHESIOLOGY

## 2018-03-19 PROCEDURE — 76010000138 HC OR TIME 0.5 TO 1 HR: Performed by: OTOLARYNGOLOGY

## 2018-03-19 PROCEDURE — 77030036727 HC DEV INFL BLN SNUS SE DISP ACCL -B: Performed by: OTOLARYNGOLOGY

## 2018-03-19 PROCEDURE — 77030006671 HC BLD MYRIN BVR BD -A: Performed by: OTOLARYNGOLOGY

## 2018-03-19 PROCEDURE — 74011250637 HC RX REV CODE- 250/637: Performed by: OTOLARYNGOLOGY

## 2018-03-19 DEVICE — U-TUBE 1.35MM ID
Type: IMPLANTABLE DEVICE | Site: EAR | Status: FUNCTIONAL
Brand: JEDMED

## 2018-03-19 RX ORDER — EPHEDRINE SULFATE 50 MG/ML
INJECTION, SOLUTION INTRAVENOUS AS NEEDED
Status: DISCONTINUED | OUTPATIENT
Start: 2018-03-19 | End: 2018-03-19 | Stop reason: HOSPADM

## 2018-03-19 RX ORDER — ONDANSETRON 2 MG/ML
INJECTION INTRAMUSCULAR; INTRAVENOUS AS NEEDED
Status: DISCONTINUED | OUTPATIENT
Start: 2018-03-19 | End: 2018-03-19 | Stop reason: HOSPADM

## 2018-03-19 RX ORDER — CIPROFLOXACIN 0.5 MG/.25ML
SOLUTION/ DROPS AURICULAR (OTIC) AS NEEDED
Status: DISCONTINUED | OUTPATIENT
Start: 2018-03-19 | End: 2018-03-19 | Stop reason: HOSPADM

## 2018-03-19 RX ORDER — SODIUM CHLORIDE, SODIUM LACTATE, POTASSIUM CHLORIDE, CALCIUM CHLORIDE 600; 310; 30; 20 MG/100ML; MG/100ML; MG/100ML; MG/100ML
150 INJECTION, SOLUTION INTRAVENOUS CONTINUOUS
Status: DISCONTINUED | OUTPATIENT
Start: 2018-03-19 | End: 2018-03-19 | Stop reason: HOSPADM

## 2018-03-19 RX ORDER — MIDAZOLAM HYDROCHLORIDE 1 MG/ML
2 INJECTION, SOLUTION INTRAMUSCULAR; INTRAVENOUS
Status: DISCONTINUED | OUTPATIENT
Start: 2018-03-19 | End: 2018-03-19 | Stop reason: HOSPADM

## 2018-03-19 RX ORDER — SUCCINYLCHOLINE CHLORIDE 20 MG/ML
INJECTION INTRAMUSCULAR; INTRAVENOUS AS NEEDED
Status: DISCONTINUED | OUTPATIENT
Start: 2018-03-19 | End: 2018-03-19 | Stop reason: HOSPADM

## 2018-03-19 RX ORDER — FENTANYL CITRATE 50 UG/ML
100 INJECTION, SOLUTION INTRAMUSCULAR; INTRAVENOUS ONCE
Status: DISCONTINUED | OUTPATIENT
Start: 2018-03-19 | End: 2018-03-19 | Stop reason: HOSPADM

## 2018-03-19 RX ORDER — OXYMETAZOLINE HCL 0.05 %
SPRAY, NON-AEROSOL (ML) NASAL AS NEEDED
Status: DISCONTINUED | OUTPATIENT
Start: 2018-03-19 | End: 2018-03-19 | Stop reason: HOSPADM

## 2018-03-19 RX ORDER — FENTANYL CITRATE 50 UG/ML
INJECTION, SOLUTION INTRAMUSCULAR; INTRAVENOUS AS NEEDED
Status: DISCONTINUED | OUTPATIENT
Start: 2018-03-19 | End: 2018-03-19 | Stop reason: HOSPADM

## 2018-03-19 RX ORDER — LIDOCAINE HYDROCHLORIDE 10 MG/ML
0.1 INJECTION INFILTRATION; PERINEURAL AS NEEDED
Status: DISCONTINUED | OUTPATIENT
Start: 2018-03-19 | End: 2018-03-19 | Stop reason: HOSPADM

## 2018-03-19 RX ORDER — HYDROMORPHONE HYDROCHLORIDE 2 MG/ML
0.5 INJECTION, SOLUTION INTRAMUSCULAR; INTRAVENOUS; SUBCUTANEOUS
Status: DISCONTINUED | OUTPATIENT
Start: 2018-03-19 | End: 2018-03-19 | Stop reason: HOSPADM

## 2018-03-19 RX ORDER — ACETAMINOPHEN 500 MG
1000 TABLET ORAL
Status: DISCONTINUED | OUTPATIENT
Start: 2018-03-19 | End: 2018-03-19 | Stop reason: HOSPADM

## 2018-03-19 RX ORDER — SODIUM CHLORIDE 9 MG/ML
50 INJECTION, SOLUTION INTRAVENOUS CONTINUOUS
Status: DISCONTINUED | OUTPATIENT
Start: 2018-03-19 | End: 2018-03-19 | Stop reason: HOSPADM

## 2018-03-19 RX ORDER — PROPOFOL 10 MG/ML
INJECTION, EMULSION INTRAVENOUS AS NEEDED
Status: DISCONTINUED | OUTPATIENT
Start: 2018-03-19 | End: 2018-03-19 | Stop reason: HOSPADM

## 2018-03-19 RX ORDER — HYDROCODONE BITARTRATE AND ACETAMINOPHEN 5; 325 MG/1; MG/1
1 TABLET ORAL AS NEEDED
Status: DISCONTINUED | OUTPATIENT
Start: 2018-03-19 | End: 2018-03-19 | Stop reason: HOSPADM

## 2018-03-19 RX ORDER — ROCURONIUM BROMIDE 10 MG/ML
INJECTION, SOLUTION INTRAVENOUS AS NEEDED
Status: DISCONTINUED | OUTPATIENT
Start: 2018-03-19 | End: 2018-03-19 | Stop reason: HOSPADM

## 2018-03-19 RX ORDER — LIDOCAINE HYDROCHLORIDE 20 MG/ML
INJECTION, SOLUTION EPIDURAL; INFILTRATION; INTRACAUDAL; PERINEURAL AS NEEDED
Status: DISCONTINUED | OUTPATIENT
Start: 2018-03-19 | End: 2018-03-19 | Stop reason: HOSPADM

## 2018-03-19 RX ORDER — FAMOTIDINE 20 MG/1
20 TABLET, FILM COATED ORAL ONCE
Status: DISCONTINUED | OUTPATIENT
Start: 2018-03-19 | End: 2018-03-19 | Stop reason: HOSPADM

## 2018-03-19 RX ORDER — SODIUM CHLORIDE 0.9 % (FLUSH) 0.9 %
5-10 SYRINGE (ML) INJECTION AS NEEDED
Status: DISCONTINUED | OUTPATIENT
Start: 2018-03-19 | End: 2018-03-19 | Stop reason: HOSPADM

## 2018-03-19 RX ORDER — LIDOCAINE HYDROCHLORIDE AND EPINEPHRINE 10; 10 MG/ML; UG/ML
INJECTION, SOLUTION INFILTRATION; PERINEURAL AS NEEDED
Status: DISCONTINUED | OUTPATIENT
Start: 2018-03-19 | End: 2018-03-19 | Stop reason: HOSPADM

## 2018-03-19 RX ADMIN — ROCURONIUM BROMIDE 5 MG: 10 INJECTION, SOLUTION INTRAVENOUS at 09:50

## 2018-03-19 RX ADMIN — FENTANYL CITRATE 100 MCG: 50 INJECTION, SOLUTION INTRAMUSCULAR; INTRAVENOUS at 09:50

## 2018-03-19 RX ADMIN — LIDOCAINE HYDROCHLORIDE 0.1 ML: 10 INJECTION, SOLUTION INFILTRATION; PERINEURAL at 08:26

## 2018-03-19 RX ADMIN — SUCCINYLCHOLINE CHLORIDE 140 MG: 20 INJECTION INTRAMUSCULAR; INTRAVENOUS at 09:50

## 2018-03-19 RX ADMIN — EPHEDRINE SULFATE 10 MG: 50 INJECTION, SOLUTION INTRAVENOUS at 09:59

## 2018-03-19 RX ADMIN — LIDOCAINE HYDROCHLORIDE 100 MG: 20 INJECTION, SOLUTION EPIDURAL; INFILTRATION; INTRACAUDAL; PERINEURAL at 09:50

## 2018-03-19 RX ADMIN — ONDANSETRON 4 MG: 2 INJECTION INTRAMUSCULAR; INTRAVENOUS at 10:00

## 2018-03-19 RX ADMIN — PROPOFOL 200 MG: 10 INJECTION, EMULSION INTRAVENOUS at 09:50

## 2018-03-19 RX ADMIN — EPHEDRINE SULFATE 5 MG: 50 INJECTION, SOLUTION INTRAVENOUS at 09:57

## 2018-03-19 RX ADMIN — SODIUM CHLORIDE, SODIUM LACTATE, POTASSIUM CHLORIDE, AND CALCIUM CHLORIDE 150 ML/HR: 600; 310; 30; 20 INJECTION, SOLUTION INTRAVENOUS at 08:27

## 2018-03-19 NOTE — ANESTHESIA PREPROCEDURE EVALUATION
Anesthetic History   No history of anesthetic complications            Review of Systems / Medical History  Patient summary reviewed and pertinent labs reviewed    Pulmonary        Sleep apnea: CPAP  Smoker (quit 40 years)         Neuro/Psych   Within defined limits           Cardiovascular              CAD, cardiac stents (2 last september therefore is still taking asa and brilinta, no angina) and hyperlipidemia    Exercise tolerance: >4 METS     GI/Hepatic/Renal     GERD: well controlled      Liver disease (fatty liver)     Endo/Other    Diabetes (pre, no meds.): type 2         Other Findings              Physical Exam    Airway  Mallampati: II  TM Distance: 4 - 6 cm  Neck ROM: normal range of motion        Cardiovascular  Regular rate and rhythm,  S1 and S2 normal,  no murmur, click, rub, or gallop  Rhythm: regular  Rate: normal         Dental    Dentition: Caps/crowns     Pulmonary  Breath sounds clear to auscultation               Abdominal         Other Findings            Anesthetic Plan    ASA: 4  Anesthesia type: general          Induction: Intravenous  Anesthetic plan and risks discussed with: Patient and Spouse

## 2018-03-19 NOTE — ANESTHESIA POSTPROCEDURE EVALUATION
Post-Anesthesia Evaluation and Assessment    Patient: Eden Estrada. MRN: [id number]  SSN: [id number]    YOB: 1945  Age: 74 y.o. Sex: male       Cardiovascular Function/Vital Signs  Visit Vitals    /55    Pulse 60    Temp 36.5 °C (97.7 °F)    Resp 16    Ht 5' 8\" (1.727 m)    Wt 67.4 kg (148 lb 9.6 oz)    SpO2 95%    BMI 22.59 kg/m2       Patient is status post general anesthesia for Procedure(s):  RIGHT U TUBE / RIGHT EUSTACHIAN TUBE BALLOON. Nausea/Vomiting: None    Postoperative hydration reviewed and adequate. Pain:  Pain Scale 1: Numeric (0 - 10) (03/19/18 1122)  Pain Intensity 1: 0 (03/19/18 1122)   Managed    Neurological Status:   Neuro (WDL): Exceptions to WDL (03/19/18 1107)  Neuro  Neurologic State: Drowsy (03/19/18 1107)  Orientation Level: Oriented X4 (03/19/18 1107)   At baseline    Mental Status and Level of Consciousness: Arousable    Pulmonary Status:   O2 Device: Room air (03/19/18 1122)   Adequate oxygenation and airway patent    Complications related to anesthesia: None    Post-anesthesia assessment completed.  No concerns    Signed By: Romain Cash MD     March 19, 2018

## 2018-03-19 NOTE — OP NOTES
[facility] REPORT    Haylee Eason.  MR#: [id number]  : 1945  ACCOUNT #: [de-identified]   DATE OF SERVICE: 2018    PREOPERATIVE DIAGNOSIS:  Eustachian tube dysfunction, chronic otitis media. POSTOPERATIVE DIAGNOSIS:  Eustachian tube dysfunction, chronic otitis media. PROCEDURE PERFORMED:  Right myringotomy with U-tube placement and a right eustachian tube balloon dilation. SURGEON:  Micah Fernandez. DO. Claire     ASSISTANT:  None. ANESTHESIA:  General.    COMPLICATIONS:  None. ESTIMATED BLOOD LOSS:  Approximately 1 mL. COMPLICATIONS:  None    HISTORY OF PRESENT ILLNESS:  This is a 78-year-old male came to see me in the office because of ear pressure or fullness, feeling like he cannot ever change elevations because of severe pressure in his ears. He can get fluid at times. He has a fullness almost likely hearing loss because of that even now he does have an underlying nerve damage, hearing loss, but he gets extra fullness on top of that because of the pressure from the eustachian tube. He has been on nasal steroid sprays, antihistamines with no improvement and so he is looking for some other option. Physical exam did not reveal any fluid in the office, but since he had exhausted medical therapy, it was my recommendation to him that he undergo a right myringotomy with U-tube placement along with a right eustachian tube balloon dilation. Procedure risks and benefits were discussed with them in the office. All questions were answered and he is agreeable to the surgery. SURGERY ITSELF:  The patient was identified in the preoperative waiting area. He was taken back to the operating room where he underwent general anesthesia. The microscope was brought into the field. The right ear was evaluated.   There was some wax removed from the external auditory canal using the cerumen curette and then a myringotomy knife was used to make an incision in the anterior inferior quadrant of the tympanic membrane. There was no fluid in the middle ear space today, so a Jones myringotomy tube was placed in the tympanic membrane followed by antibiotic eardrops and a cotton ball. Attention was then paid to the inside of the nose. Zero degree scope was placed in each side of the nose. There was some inferior turbinate hypertrophy, otherwise there was no sign of any other nasal masses or obstruction. No bleeding. The septum was straight. I could see the openings to both eustachian tubes and there appeared to be no sign of any blockage, mass or lesion, so the scope was eventually placed in the left side of the nose visualizing the right eustachian tube opening. Then, under visualization, I was able to insert the balloon, the full length the balloon, without any hold-ups whatsoever into the eustachian tube. The balloon was inflated to a pressure of 12. This was held for 2 minutes, timed on a clock, and then the balloon was deflated and removed. The patient was then awakened and taken to postop recovery room in stable condition.       DO Lisbet Esquivel  D: 03/19/2018 12:52     T: 03/19/2018 14:50  JOB #: [id number]

## 2018-03-19 NOTE — DISCHARGE INSTRUCTIONS
Ear Tube Placement: What to Expect at [address] [place] [address]        Most people have little pain after ear tube placement and usually recover quickly. You will feel tired for a day, but should be able to go back to work the day after surgery. You will need to see the doctor regularly to make sure the tubes are working. The doctor also will check your hearing. Activity  · You may need to wear earplugs while taking a bath or shower. This keeps water out of the ears. Your doctor will talk to you about the use of earplugs. · Follow your doctor's directions about when you can go swimming. Diet  · You may resume your normal diet. Medicines  · Give pain medicines exactly as directed. ¨ If the doctor gave you a prescription medicine for pain, take it as prescribed. ¨ If you are not taking a prescription pain medicine, ask your doctor if you can take an over-the-counter medicine. ¨ Do not take two or more pain medicines at the same time unless the doctor told you to. Many pain medicines have acetaminophen, which is Tylenol. Too much acetaminophen (Tylenol) can be harmful. · If you think the pain medicine is making you sick to his or her stomach:  ¨ Take the medicine after meals (unless the doctor has told you not to). ¨ Ask your doctor for a different pain medicine. · If the doctor prescribed antibiotics for you, take them as directed. Do not stop using them just because you feel better. You need to take the full course of antibiotics. Follow-up care is a key part of your treatment and safety. Be sure to make and go to all appointments, and call your doctor if you are having problems. It's also a good idea to know your test results and keep a list of the medicines you take. When should you call for help? Call 911 anytime you think you may need emergency care. For example, call if:  · You pass out (loses consciousness). ·  Have trouble breathing.   Call your doctor now or seek immediate medical care if:  · You have a fever over 100.4°F that will not come down, even if you drink fluids or takes medicine. · You have pain that does not get better after you take pain medicines. · You have drainage from the ear for more than 3 days. · You have drainage that has stopped and started again. · You get an earache after the tube is in.  · You have severe vomiting. Watch closely for changes in your health, and be sure to contact your doctor if you have any problems. Where can you learn more? Go to [employer].be  Enter Q484 in the search box to learn more about \"Ear Tube Placement in Children: What to Expect at Home. \"   © 3612-1491 Healthwise, Incorporated. Care instructions adapted under license by Marilu Butts (which disclaims liability or warranty for this information). This care instruction is for use with your licensed healthcare professional. If you have questions about a medical condition or this instruction, always ask your healthcare professional. [place] [address] any warranty or liability for your use of this information. Content Version: 37.9.747410;  Last Revised: February 19, 2013

## 2018-03-19 NOTE — IP AVS SNAPSHOT
[address] [facility] [address] [address] 
[phone] Patient: Kali Billingsley. MRN: [id number] WPL:[id number]/[id number] About your hospitalization You were admitted on:  March 19, 2018 You last received care in the:  [facility] PACU You were discharged on:  March 19, 2018 Why you were hospitalized Your primary diagnosis was:  Not on File Follow-up Information Follow up With Details Comments Contact Info Manjula Morelos DO  Please call for follow-up appointment. [place] [facility] [address] [facility] [address] 
[phone] Your Scheduled Appointments Wednesday March 21, 2018 11:15 AM EDT Follow Up with Audra Dangelo MD  
[address] [facility] ([address] [place] [address]) [address] [facility] [address] [place] [address]  
[phone] Tuesday March 27, 2018 12:30 PM EDT  
POST OP with Manjula Morelos DO  
[facility] [facility]) [address]  
[phone] Discharge Orders None A check danielito indicates which time of day the medication should be taken. My Medications ASK your doctor about these medications Instructions Each Dose to Equal  
 Morning Noon Evening Bedtime ADDERALL 10 mg tablet Generic drug:  dextroamphetamine-amphetamine Your last dose was: Your next dose is: Take 10 mg by mouth dailyIndications: Attention-Deficit Hyperactivity Disorder. 10 mg  
    
   
   
   
  
 alpha lipoic acid 200 mg Cap Your last dose was: Your next dose is: Take 200 mg by mouth daily. 200 mg  
    
   
   
   
  
 aspirin 81 mg tablet Your last dose was: Your next dose is: Take 81 mg by mouth nightly. 81 mg  
    
   
   
   
  
 atorvastatin 80 mg tablet Commonly known as:  LIPITOR Your last dose was: Your next dose is: Take 80 mg by mouth nightly. Indications: hypercholesterolemia 80 mg  
    
   
   
   
  
 b complex vitamins tablet Your last dose was: Your next dose is: Take 1 Tab by mouth daily. 1 Tab BERBERINE-HERBAL COMB NO.18 PO Your last dose was: Your next dose is: Take 500 mg by mouth daily. 500 mg BRILINTA 90 mg tablet Generic drug:  ticagrelor Your last dose was: Your next dose is: Take 90 mg by mouth two (2) times a day. Indications: Thrombosis Prevention after PCI  
 90 mg  
    
   
   
   
  
 CIALIS 5 mg tablet Generic drug:  tadalafil Your last dose was: Your next dose is: Take 5 mg by mouth daily as needed. 5 mg  
    
   
   
   
  
 clonazePAM 1 mg tablet Commonly known as:  Stephanie Prado Your last dose was: Your next dose is: Take 0.5 mg by mouth nightly. 0.5 mg  
    
   
   
   
  
 CO Q-10 100 mg capsule Generic drug:  co-enzyme Q-10 Your last dose was: Your next dose is: Take 200 mg by mouth daily. 200 mg  
    
   
   
   
  
 cpap machine kit Your last dose was: Your next dose is:    
   
   
 by Does Not Apply route. autopap 5-15 cm FISH OIL 1,000 mg Cap Generic drug:  omega-3 fatty acids-vitamin e Your last dose was: Your next dose is: Take 1 Cap by mouth daily. 1 Cap  
    
   
   
   
  
 magnesium gluconate 500 mg (27 mg  elemental) tablet Your last dose was: Your next dose is: Take 500 mg by mouth every other day. 500 mg  
    
   
   
   
  
 multivitamin tablet Commonly known as:  ONE A DAY Your last dose was: Your next dose is: Take 1 Tab by mouth daily. 1 Tab  
    
   
   
   
  
 nitroglycerin 0.4 mg SL tablet Commonly known as:  NITROSTAT Your last dose was: Your next dose is: 0.4 mg by SubLINGual route every five (5) minutes as needed for Chest Pain. Indications: Angina  
 0.4 mg  
    
   
   
   
  
 omeprazole 20 mg capsule Commonly known as:  PRILOSEC Your last dose was: Your next dose is:    
   
   
 TK 1 C PO BID 30 MIN B WILIAM AND DINNER  
     
   
   
   
  
 potassium chloride SR 10 mEq tablet Commonly known as:  KLOR-CON 10 Your last dose was: Your next dose is: Take 10 mEq by mouth every other day. 10 mEq PROBIOTIC 4X 10-15 mg Tbec Generic drug:  B.infantis-B.ani-B.long-B.bifi Your last dose was: Your next dose is: Take 1 Tab by mouth every other day. 1 Tab  
    
   
   
   
  
 propranolol 10 mg tablet Commonly known as:  INDERAL Your last dose was: Your next dose is:    
   
   
 TK 1 T PO BID FOR JITTERNESS  
     
   
   
   
  
 sertraline 50 mg tablet Commonly known as:  ZOLOFT Your last dose was: Your next dose is: Take 50 mg by mouth nightly. Indications: ANXIETY WITH DEPRESSION 50 mg  
    
   
   
   
  
 triamterene-hydroCHLOROthiazide 37.5-25 mg per capsule Commonly known as:  Reno Francisco Your last dose was: Your next dose is: TAKE 1 CAPSULE BY MOUTH DAILY  
     
   
   
   
  
 turmeric root extract 500 mg Cap Your last dose was: Your next dose is:    
   
   
 1 per mouth daily VITAMIN D3 1,000 unit Cap Generic drug:  cholecalciferol Your last dose was: Your next dose is: Take 2,000 Units by mouth daily. 2000 Units Discharge Instructions Ear Tube Placement: What to Expect at Home Your Recovery Most people have little pain after ear tube placement and usually recover quickly. You will feel tired for a day, but should be able to go back to work the day after surgery. You will need to see the doctor regularly to make sure the tubes are working. The doctor also will check your hearing. Activity · You may need to wear earplugs while taking a bath or shower. This keeps water out of the ears. Your doctor will talk to you about the use of earplugs. · Follow your doctor's directions about when you can go swimming. Diet · You may resume your normal diet. Medicines · Give pain medicines exactly as directed. ¨ If the doctor gave you a prescription medicine for pain, take it as prescribed. ¨ If you are not taking a prescription pain medicine, ask your doctor if you can take an over-the-counter medicine. ¨ Do not take two or more pain medicines at the same time unless the doctor told you to. Many pain medicines have acetaminophen, which is Tylenol. Too much acetaminophen (Tylenol) can be harmful. · If you think the pain medicine is making you sick to his or her stomach: 
¨ Take the medicine after meals (unless the doctor has told you not to). ¨ Ask your doctor for a different pain medicine. · If the doctor prescribed antibiotics for you, take them as directed. Do not stop using them just because you feel better. You need to take the full course of antibiotics. Follow-up care is a key part of your treatment and safety. Be sure to make and go to all appointments, and call your doctor if you are having problems. It's also a good idea to know your test results and keep a list of the medicines you take. When should you call for help? Call 911 anytime you think you may need emergency care. For example, call if: 
· You pass out (loses consciousness). ·  Have trouble breathing. Call your doctor now or seek immediate medical care if: 
· You have a fever over 100.4°F that will not come down, even if you drink fluids or takes medicine. · You have pain that does not get better after you take pain medicines. · You have drainage from the ear for more than 3 days. · You have drainage that has stopped and started again. · You get an earache after the tube is in. 
· You have severe vomiting. Watch closely for changes in your health, and be sure to contact your doctor if you have any problems. Where can you learn more? Go to [employer].be Enter K278 in the search box to learn more about \"Ear Tube Placement in Children: What to Expect at Home. \"  
© 7686-7874 Healthwise, Incorporated. Care instructions adapted under license by Nelda Pace (which disclaims liability or warranty for this information). This care instruction is for use with your licensed healthcare professional. If you have questions about a medical condition or this instruction, always ask your healthcare professional. [address] any warranty or liability for your use of this information. Content Version: 58.5.883935; Last Revised: February 19, 2013 ACO Transitions of Care Introducing [employer] [address] Wayne offers a voluntary care coordination program to provide high quality service and care to [facility] fee-for-service beneficiaries. Jayesh Rojas was designed to help you enhance your health and well-being through the following services: ? Transitions of Care  support for individuals who are transitioning from one care setting to another (example: Hospital to home). ? Chronic and Complex Care Coordination  support for individuals and caregivers of those with serious or chronic illnesses or with more than one chronic (ongoing) condition and those who take a number of different medications.   
 
 
If you meet specific medical criteria, a Critical access hospital [address] may call you directly to coordinate your care with your primary care physician and your other care providers. For questions about the [facility] programs, please, contact your physicians office. For general questions or additional information about Accountable Care Organizations: 
Please visit www.medicare.gov/acos. html or call 1-800-MEDICARE (0-983.788.6388) TTY users should call 7-284.891.6152. Introducing [facility] SERVICES! Dear Irene Harris: Thank you for requesting a Rib[employer]x account. Our records indicate that you already have an active Riboxx account. You can access your account anytime at [email or web address] Did you know that you can access your hospital and ER discharge instructions at any time in Riboxx? You can also review all of your test results from your hospital stay or ER visit. Additional Information If you have questions, please visit the Frequently Asked Questions section of the Riboxx website at [email or web address] Remember, Riboxx is NOT to be used for urgent needs. For medical emergencies, dial 911. Now available from your iPhone and Android! Providers Seen During Your Hospitalization Provider Specialty Primary office phone Taya Enrique DO Otolaryngology [phone] Your Primary Care Physician (PCP) Primary Care Physician Office Phone Office Fax Erle Eso [phone] You are allergic to the following Allergen Reactions Other Medication Other (comments) Allergy to vinegar \"my nose runs\" and allergy to yeast \"my nose gets stuffy;' red wine-runny nose Red Wine Extract Other (comments) Allergy to vinegar \"my nose runs\" and allergy to yeast \"my nose gets stuffy;' red wine-runny nose Recent Documentation Height Weight BMI Smoking Status 1.727 m 67.4 kg 22.59 kg/m2 Former Smoker Emergency Contacts Name Discharge Info Relation Home Work Mobile [facility] DISCHARGE CAREGIVER [3] Spouse [3] [phone] Patient Belongings The following personal items are in your possession at time of discharge: 
  Dental Appliances: None  Visual Aid: Glasses Please provide this summary of care documentation to your next provider. Signatures-by signing, you are acknowledging that this After Visit Summary has been reviewed with you and you have received a copy. Patient Signature:  ____________________________________________________________ Date:  ____________________________________________________________  
  
Damien Lion Provider Signature:  ____________________________________________________________ Date:  ____________________________________________________________

## 2018-04-23 PROBLEM — G47.33 OSA (OBSTRUCTIVE SLEEP APNEA): Status: ACTIVE | Noted: 2018-04-23

## 2019-01-04 ENCOUNTER — HOSPITAL ENCOUNTER (OUTPATIENT)
Dept: ULTRASOUND IMAGING | Age: 74
Discharge: HOME | End: 2019-01-04
Attending: FAMILY MEDICINE
Payer: MEDICARE

## 2019-01-04 DIAGNOSIS — Z87.891 PERSONAL HISTORY OF TOBACCO USE, PRESENTING HAZARDS TO HEALTH: ICD-10-CM

## 2019-01-04 PROCEDURE — 93978 VASCULAR STUDY: CPT

## 2019-01-23 ENCOUNTER — HOSPITAL ENCOUNTER (OUTPATIENT)
Dept: CT IMAGING | Age: 74
Discharge: HOME | End: 2019-01-23
Attending: FAMILY MEDICINE
Payer: MEDICARE

## 2019-01-23 VITALS — BODY MASS INDEX: 21.98 KG/M2 | WEIGHT: 145 LBS | HEIGHT: 68 IN

## 2019-01-23 DIAGNOSIS — N28.1 RENAL CYST, LEFT: ICD-10-CM

## 2019-01-23 LAB — CREAT BLD-MCNC: 1 MG/DL (ref 0.8–1.5)

## 2019-01-23 PROCEDURE — 82565 ASSAY OF CREATININE: CPT

## 2019-01-23 PROCEDURE — 74011636320 HC RX REV CODE- 636/320

## 2019-01-23 PROCEDURE — 74011000258 HC RX REV CODE- 258

## 2019-01-23 PROCEDURE — 74170 CT ABD WO CNTRST FLWD CNTRST: CPT

## 2019-01-23 RX ORDER — SODIUM CHLORIDE 0.9 % (FLUSH) 0.9 %
10 SYRINGE (ML) INJECTION
Status: COMPLETED | OUTPATIENT
Start: 2019-01-23 | End: 2019-01-23

## 2019-01-23 RX ADMIN — IOPAMIDOL 100 ML: 755 INJECTION, SOLUTION INTRAVENOUS at 10:21

## 2019-01-23 RX ADMIN — SODIUM CHLORIDE 100 ML: 900 INJECTION, SOLUTION INTRAVENOUS at 10:21

## 2019-01-23 RX ADMIN — Medication 10 ML: at 10:21

## 2019-04-01 PROBLEM — M79.644 PAIN OF RIGHT THUMB: Status: ACTIVE | Noted: 2019-04-01

## 2019-04-01 PROBLEM — G56.01 CARPAL TUNNEL SYNDROME OF RIGHT WRIST: Status: ACTIVE | Noted: 2019-04-01

## 2019-04-23 ENCOUNTER — HOSPITAL ENCOUNTER (OUTPATIENT)
Dept: GENERAL RADIOLOGY | Age: 74
Discharge: HOME | End: 2019-04-23
Payer: MEDICARE

## 2019-04-23 DIAGNOSIS — M79.641 RIGHT HAND PAIN: ICD-10-CM

## 2019-04-23 PROCEDURE — 73130 X-RAY EXAM OF HAND: CPT

## 2019-05-01 ENCOUNTER — HOSPITAL ENCOUNTER (OUTPATIENT)
Dept: MRI IMAGING | Age: 74
Discharge: HOME | End: 2019-05-01
Attending: FAMILY MEDICINE
Payer: MEDICARE

## 2019-05-01 DIAGNOSIS — G44.229 CHRONIC TENSION-TYPE HEADACHE, NOT INTRACTABLE: ICD-10-CM

## 2019-05-01 PROCEDURE — 70551 MRI BRAIN STEM W/O DYE: CPT

## 2019-05-16 ENCOUNTER — HOSPITAL ENCOUNTER (OUTPATIENT)
Dept: CT IMAGING | Age: 74
Discharge: HOME | End: 2019-05-16
Attending: NEUROLOGICAL SURGERY
Payer: MEDICARE

## 2019-05-16 DIAGNOSIS — Q28.2 AVM (ARTERIOVENOUS MALFORMATION) BRAIN: ICD-10-CM

## 2019-05-16 DIAGNOSIS — R93.0 ABNORMAL MRI OF HEAD: ICD-10-CM

## 2019-05-16 PROCEDURE — 74011636320 HC RX REV CODE- 636/320: Performed by: NEUROLOGICAL SURGERY

## 2019-05-16 PROCEDURE — 70496 CT ANGIOGRAPHY HEAD: CPT

## 2019-05-16 PROCEDURE — 74011000258 HC RX REV CODE- 258: Performed by: NEUROLOGICAL SURGERY

## 2019-05-16 RX ORDER — SODIUM CHLORIDE 0.9 % (FLUSH) 0.9 %
10 SYRINGE (ML) INJECTION
Status: COMPLETED | OUTPATIENT
Start: 2019-05-16 | End: 2019-05-16

## 2019-05-16 RX ADMIN — Medication 10 ML: at 13:56

## 2019-05-16 RX ADMIN — SODIUM CHLORIDE 100 ML: 900 INJECTION, SOLUTION INTRAVENOUS at 13:56

## 2019-05-16 RX ADMIN — IOPAMIDOL 70 ML: 755 INJECTION, SOLUTION INTRAVENOUS at 13:56

## 2019-07-23 ENCOUNTER — HOSPITAL ENCOUNTER (OUTPATIENT)
Dept: INTERVENTIONAL RADIOLOGY/VASCULAR | Age: 74
Discharge: HOME | End: 2019-07-23
Attending: NEUROLOGICAL SURGERY
Payer: MEDICARE

## 2019-07-23 VITALS
BODY MASS INDEX: 21.33 KG/M2 | TEMPERATURE: 97.7 F | HEART RATE: 58 BPM | OXYGEN SATURATION: 97 % | RESPIRATION RATE: 14 BRPM | HEIGHT: 69 IN | WEIGHT: 144 LBS | DIASTOLIC BLOOD PRESSURE: 72 MMHG | SYSTOLIC BLOOD PRESSURE: 141 MMHG

## 2019-07-23 DIAGNOSIS — I67.1 CEREBROVASCULAR DURAL AV FISTULA: ICD-10-CM

## 2019-07-23 DIAGNOSIS — R93.0 ABNORMAL ANGIOGRAM OF HEAD: ICD-10-CM

## 2019-07-23 DIAGNOSIS — I67.1 DURAL ARTERIOVENOUS FISTULA: Primary | ICD-10-CM

## 2019-07-23 LAB
ANION GAP SERPL CALC-SCNC: 8 MMOL/L (ref 7–16)
APTT PPP: 30.5 SEC (ref 24.7–39.8)
BUN SERPL-MCNC: 17 MG/DL (ref 8–23)
CALCIUM SERPL-MCNC: 9.4 MG/DL (ref 8.3–10.4)
CHLORIDE SERPL-SCNC: 104 MMOL/L (ref 98–107)
CO2 SERPL-SCNC: 28 MMOL/L (ref 21–32)
CREAT SERPL-MCNC: 1.18 MG/DL (ref 0.8–1.5)
ERYTHROCYTE [DISTWIDTH] IN BLOOD BY AUTOMATED COUNT: 13.9 % (ref 11.9–14.6)
GLUCOSE SERPL-MCNC: 107 MG/DL (ref 65–100)
HCT VFR BLD AUTO: 42.5 % (ref 41.1–50.3)
HGB BLD-MCNC: 13.8 G/DL (ref 13.6–17.2)
INR PPP: 1
MCH RBC QN AUTO: 30 PG (ref 26.1–32.9)
MCHC RBC AUTO-ENTMCNC: 32.5 G/DL (ref 31.4–35)
MCV RBC AUTO: 92.4 FL (ref 79.6–97.8)
NRBC # BLD: 0 K/UL (ref 0–0.2)
PLATELET # BLD AUTO: 163 K/UL (ref 150–450)
PMV BLD AUTO: 10.9 FL (ref 9.4–12.3)
POTASSIUM SERPL-SCNC: 3.8 MMOL/L (ref 3.5–5.1)
PROTHROMBIN TIME: 13.2 SEC (ref 11.7–14.5)
RBC # BLD AUTO: 4.6 M/UL (ref 4.23–5.6)
SODIUM SERPL-SCNC: 140 MMOL/L (ref 136–145)
WBC # BLD AUTO: 5.4 K/UL (ref 4.3–11.1)

## 2019-07-23 PROCEDURE — 36224 PLACE CATH CAROTD ART: CPT

## 2019-07-23 PROCEDURE — 85610 PROTHROMBIN TIME: CPT

## 2019-07-23 PROCEDURE — 74011250636 HC RX REV CODE- 250/636: Performed by: NEUROLOGICAL SURGERY

## 2019-07-23 PROCEDURE — 77030012468 HC VLV BLEEDBK CNTRL ABBT -B

## 2019-07-23 PROCEDURE — C1887 CATHETER, GUIDING: HCPCS

## 2019-07-23 PROCEDURE — C1769 GUIDE WIRE: HCPCS

## 2019-07-23 PROCEDURE — 85730 THROMBOPLASTIN TIME PARTIAL: CPT

## 2019-07-23 PROCEDURE — 76376 3D RENDER W/INTRP POSTPROCES: CPT | Performed by: NEUROLOGICAL SURGERY

## 2019-07-23 PROCEDURE — 85027 COMPLETE CBC AUTOMATED: CPT

## 2019-07-23 PROCEDURE — 80048 BASIC METABOLIC PNL TOTAL CA: CPT

## 2019-07-23 PROCEDURE — C1894 INTRO/SHEATH, NON-LASER: HCPCS

## 2019-07-23 PROCEDURE — 36226 PLACE CATH VERTEBRAL ART: CPT | Performed by: NEUROLOGICAL SURGERY

## 2019-07-23 PROCEDURE — C1760 CLOSURE DEV, VASC: HCPCS

## 2019-07-23 PROCEDURE — 36227 PLACE CATH XTRNL CAROTID: CPT | Performed by: NEUROLOGICAL SURGERY

## 2019-07-23 PROCEDURE — 36224 PLACE CATH CAROTD ART: CPT | Performed by: NEUROLOGICAL SURGERY

## 2019-07-23 PROCEDURE — 74011250636 HC RX REV CODE- 250/636

## 2019-07-23 PROCEDURE — 36223 PLACE CATH CAROTID/INOM ART: CPT | Performed by: NEUROLOGICAL SURGERY

## 2019-07-23 PROCEDURE — 74011636320 HC RX REV CODE- 636/320: Performed by: NEUROLOGICAL SURGERY

## 2019-07-23 RX ORDER — MIDAZOLAM HYDROCHLORIDE 1 MG/ML
.25-2 INJECTION, SOLUTION INTRAMUSCULAR; INTRAVENOUS
Status: DISCONTINUED | OUTPATIENT
Start: 2019-07-23 | End: 2019-07-23 | Stop reason: ALTCHOICE

## 2019-07-23 RX ORDER — FENTANYL CITRATE 50 UG/ML
25-100 INJECTION, SOLUTION INTRAMUSCULAR; INTRAVENOUS
Status: DISCONTINUED | OUTPATIENT
Start: 2019-07-23 | End: 2019-07-23 | Stop reason: ALTCHOICE

## 2019-07-23 RX ORDER — SODIUM CHLORIDE 9 MG/ML
500 INJECTION, SOLUTION INTRAVENOUS ONCE
Status: COMPLETED | OUTPATIENT
Start: 2019-07-23 | End: 2019-07-23

## 2019-07-23 RX ORDER — SODIUM CHLORIDE 9 MG/ML
25 INJECTION, SOLUTION INTRAVENOUS ONCE
Status: COMPLETED | OUTPATIENT
Start: 2019-07-23 | End: 2019-07-23

## 2019-07-23 RX ORDER — TAMSULOSIN HYDROCHLORIDE 0.4 MG/1
0.4 CAPSULE ORAL
COMMUNITY
End: 2021-01-27

## 2019-07-23 RX ORDER — CLONAZEPAM 1 MG/1
0.5 TABLET ORAL
COMMUNITY

## 2019-07-23 RX ORDER — SODIUM CHLORIDE 9 MG/ML
150 INJECTION, SOLUTION INTRAVENOUS CONTINUOUS
Status: ACTIVE | OUTPATIENT
Start: 2019-07-23 | End: 2019-07-23

## 2019-07-23 RX ORDER — ACETAMINOPHEN 325 MG/1
650 TABLET ORAL
Status: DISCONTINUED | OUTPATIENT
Start: 2019-07-23 | End: 2019-07-27 | Stop reason: HOSPADM

## 2019-07-23 RX ORDER — POTASSIUM CHLORIDE 750 MG/1
10 TABLET, FILM COATED, EXTENDED RELEASE ORAL EVERY OTHER DAY
COMMUNITY
End: 2021-09-28 | Stop reason: ALTCHOICE

## 2019-07-23 RX ORDER — HEPARIN SODIUM 200 [USP'U]/100ML
1000 INJECTION, SOLUTION INTRAVENOUS
Status: DISCONTINUED | OUTPATIENT
Start: 2019-07-23 | End: 2019-07-23 | Stop reason: ALTCHOICE

## 2019-07-23 RX ORDER — ONDANSETRON 2 MG/ML
4 INJECTION INTRAMUSCULAR; INTRAVENOUS
Status: DISCONTINUED | OUTPATIENT
Start: 2019-07-23 | End: 2019-07-27 | Stop reason: HOSPADM

## 2019-07-23 RX ORDER — LIDOCAINE HYDROCHLORIDE 20 MG/ML
20-200 INJECTION, SOLUTION INFILTRATION; PERINEURAL
Status: DISCONTINUED | OUTPATIENT
Start: 2019-07-23 | End: 2019-07-23 | Stop reason: ALTCHOICE

## 2019-07-23 RX ADMIN — FENTANYL CITRATE 25 MCG: 50 INJECTION, SOLUTION INTRAMUSCULAR; INTRAVENOUS at 09:04

## 2019-07-23 RX ADMIN — SODIUM CHLORIDE 500 ML: 900 INJECTION, SOLUTION INTRAVENOUS at 08:29

## 2019-07-23 RX ADMIN — SODIUM CHLORIDE 25 ML/HR: 900 INJECTION, SOLUTION INTRAVENOUS at 08:08

## 2019-07-23 RX ADMIN — HEPARIN SODIUM 2000 UNITS: 200 INJECTION, SOLUTION INTRAVENOUS at 08:24

## 2019-07-23 RX ADMIN — MIDAZOLAM HYDROCHLORIDE 1 MG: 1 INJECTION, SOLUTION INTRAMUSCULAR; INTRAVENOUS at 08:13

## 2019-07-23 RX ADMIN — FENTANYL CITRATE 50 MCG: 50 INJECTION, SOLUTION INTRAMUSCULAR; INTRAVENOUS at 08:13

## 2019-07-23 RX ADMIN — SODIUM CHLORIDE 150 ML/HR: 900 INJECTION, SOLUTION INTRAVENOUS at 09:29

## 2019-07-23 RX ADMIN — HEPARIN SODIUM 2000 UNITS: 200 INJECTION, SOLUTION INTRAVENOUS at 08:27

## 2019-07-23 RX ADMIN — HEPARIN SODIUM 2000 UNITS: 200 INJECTION, SOLUTION INTRAVENOUS at 08:25

## 2019-07-23 RX ADMIN — IOPAMIDOL 200 ML: 612 INJECTION, SOLUTION INTRAVENOUS at 09:08

## 2019-07-23 RX ADMIN — LIDOCAINE HYDROCHLORIDE 120 MG: 20 INJECTION, SOLUTION INFILTRATION; PERINEURAL at 08:25

## 2019-07-23 NOTE — DISCHARGE INSTRUCTIONS
[facility]  Cerebrovascular [facility] Stroke [facility]            Cerebral Angiography Discharge Instructions    General Information: This test is done to evaluate the blood vessels in your brain and neck. Following the procedure, you will be asked to lie flat on your back for 2-6 hours after the procedure to prevent bleeding complications. The physician may use an arterial closure device that will decrease your recovery time. If this is used, your nurse will explain the difference in recovery. We have no way to determine if we can use a closure device until the procedure is started. We will let you know when you wake up after the procedure. Home Care Instructions: You can resume your regular diet. Do not shower, bathe, swim, drink alcohol, or make any important legal decisions in the next 48 hours. You may drive after 24 hours. Do not lift anything heavier than a gallon of milk for 2 days. Watch the site carefully for signs of infection, like fever, drainage, redness, and/or swelling. If you take Glucophage (Metformin) for diabetes, do not take it for the next 48 hours. If you were asked to hold any blood thinners prior to the procedure, you may restart that medicine the day after the procedure is completed or when instructed by your physician. Recline your car seat for the ride home. Call If: You should call your Physician and/or the Radiology Nurse if you have any signs of infection like fever, drainage, redness, and/or swelling. If the puncture site should ooze, please call. Also call if you have any pain, decreased sensation, numbness, tingling, swelling, or change in color to the affected extremity. SEEK IMMEDIATE MEDICAL CARE IF YOUR PUNCTURE SITE STARTS TO BLEED. APPLY ENOUGH FIRM PRESSURE TO THE SITE WITH THE TIPS OF YOUR FINGERS TO STOP THE BLEEDING. Arterial bleeding is a medical emergency and should be evaluated immediately.     Follow-Up Instructions:  Please follow up with your ordering doctor as he/she has requested. To Reach Us: If you have any questions about your procedure, please call Drea Neal NP at [phone]. If you have any questions about your procedure, please call the Interventional Radiology department at [phone]. After business hours (5pm) and weekends, call the answering service at [phone] and ask for the Radiologist on call to be paged. Interventional Radiology General Nurse Discharge    After general anesthesia or intravenous sedation, for 24 hours or while taking prescription Narcotics:  · Limit your activities  · Do not drive and operate hazardous machinery  · Do not make important personal or business decisions  · Do  not drink alcoholic beverages  · If you have not urinated within 8 hours after discharge, please contact your surgeon on call. * Please give a list of your current medications to your Primary Care Provider. * Please update this list whenever your medications are discontinued, doses are     changed, or new medications (including over-the-counter products) are added. * Please carry medication information at all times in case of emergency situations. These are general instructions for a healthy lifestyle:    No smoking/ No tobacco products/ Avoid exposure to second hand smoke  Surgeon General's Warning:  Quitting smoking now greatly reduces serious risk to your health. Obesity, smoking, and sedentary lifestyle greatly increases your risk for illness  A healthy diet, regular physical exercise & weight monitoring are important for maintaining a healthy lifestyle    You may be retaining fluid if you have a history of heart failure or if you experience any of the following symptoms:  Weight gain of 3 pounds or more overnight or 5 pounds in a week, increased swelling in our hands or feet or shortness of breath while lying flat in bed.   Please call your doctor as soon as you notice any of these symptoms; do not wait until your next office visit. Recognize signs and symptoms of STROKE:  F-face looks uneven    A-arms unable to move or move unevenly    S-speech slurred or non-existent    T-time-call 911 as soon as signs and symptoms begin-DO NOT go       Back to bed or wait to see if you get better-TIME IS BRAIN.

## 2019-07-23 NOTE — PROGRESS NOTES
TRANSFER - OUT REPORT:    Verbal report given to Fallon Charles RN (name) on [facility].  being transferred to IR recovery (unit) for routine progression of care       Report consisted of patients Situation, Background, Assessment and   Recommendations(SBAR). Information from the following report(s) Procedure Summary and MAR was reviewed with the receiving nurse. Opportunity for questions and clarification was provided. Conscious Sedation:   75 Mcg of Fentanyl administered  1 Mg of Versed administered    Pt tolerated procedure well. Dual NIHss performed.     Visit Vitals  /72   Pulse 65   Temp 97.7 °F (36.5 °C)   Resp 16   Ht 5' 9\" (1.753 m)   Wt 65.3 kg (144 lb)   SpO2 98%   BMI 21.27 kg/m²     Past Medical History:   Diagnosis Date    ADD (attention deficit disorder)     controlled with Adderall    Anemia, unspecified     Aneurysm of unspecified site ([facility] [address]     denies    Anxiety     Arthritis     hands, back    Attention deficit disorder with hyperactivity(314.01)     Benign paroxysmal vertigo     BPH (benign prostatic hypertrophy)     CAD (coronary artery disease)     2 stents placed 09/14/17; denes MI    Carpal tunnel syndrome of right wrist 4/1/2019    Chronic prostatitis 11/15/2013    Colitis, enteritis, and gastroenteritis of presumed infectious origin     Depressive disorder, not elsewhere classified     Disorders of bursae and tendons in shoulder region, unspecified     Fatty liver     Former smoker     GERD (gastroesophageal reflux disease)     controlled w/med    History of tetanus, diphtheria, and acellular pertussis booster vaccination (Tdap)     done 2010    Hypercholesteremia     Hypertrophy of prostate with urinary obstruction and other lower urinary tract symptoms (LUTS) 11/15/2013    Impotence of organic origin     Inguinal hernia 2014    bilat    Insomnia, unspecified     Medial epicondylitis of elbow     Nonspecific elevation of levels of transaminase or lactic acid dehydrogenase (LDH)     Orthostatic hypotension     Osteoporosis 11/15/2013    Other and unspecified hyperlipidemia     Pain of right thumb 4/1/2019    Sleep apnea     c-pap=instructed to bring dos     Tremor     from Adderall, Inderal prn    Type II or unspecified type diabetes mellitus without mention of complication, not stated as uncontrolled     per pt.  \"pre-diabetic\"; no treatment; last A1C=5.9    Vertigo      Peripheral IV 07/23/19 Left Antecubital (Active)   Site Assessment Clean, dry, & intact 7/23/2019  7:13 AM   Phlebitis Assessment 0 7/23/2019  7:13 AM   Infiltration Assessment 0 7/23/2019  7:13 AM   Dressing Status Clean, dry, & intact 7/23/2019  7:13 AM   Dressing Type Tape;Transparent 7/23/2019  7:13 AM   Hub Color/Line Status Pink 7/23/2019  7:13 AM

## 2019-07-23 NOTE — H&P
History of Present Illness. Patient presents for evaluation for his cerebral AVM. He was moving a piece of wood and fell and got a CT/CTA as part of his work up that showed abnormal vessels. He states he has known about the abnormal vessels for several years because his right ear was stopped up and he had an MRI done that showed enlarged vessels. He denies any FH of cerebrovascular disease. He quit smoking 50 years ago. He denies any neuro issues. Pt for diagnostic cerebral angiogram today with Dr. Marquis Norwood. Consent signed and pt and wife updated on plan of care and in agreement. Pt is aox3 and in nad. NIHSS of 0/GCS 15.  No bruits heard on exam.           Past Medical History:   Diagnosis Date    ADD (attention deficit disorder)       controlled with Adderall    Anemia, unspecified      Aneurysm of unspecified site ([address]       denies    Anxiety      Arthritis       hands, back    Attention deficit disorder with hyperactivity(314.01)      Benign paroxysmal vertigo      BPH (benign prostatic hypertrophy)      CAD (coronary artery disease)       2 stents placed 09/14/17; denes MI    Carpal tunnel syndrome of right wrist 4/1/2019    Chronic prostatitis 11/15/2013    Colitis, enteritis, and gastroenteritis of presumed infectious origin      Depressive disorder, not elsewhere classified      Disorders of bursae and tendons in shoulder region, unspecified      Fatty liver      Former smoker      GERD (gastroesophageal reflux disease)       controlled w/med    History of tetanus, diphtheria, and acellular pertussis booster vaccination (Tdap)       done 2010    Hypercholesteremia      Hypertrophy of prostate with urinary obstruction and other lower urinary tract symptoms (LUTS) 11/15/2013    Impotence of organic origin      Inguinal hernia 2014     bilat    Insomnia, unspecified      Medial epicondylitis of elbow      Nonspecific elevation of levels of transaminase or lactic acid dehydrogenase (LDH)      Orthostatic hypotension      Osteoporosis 11/15/2013    Other and unspecified hyperlipidemia      Pain of right thumb 2019    Sleep apnea       c-pap=instructed to bring dos     Tremor       from Adderall, Inderal prn    Type II or unspecified type diabetes mellitus without mention of complication, not stated as uncontrolled       per pt. \"pre-diabetic\"; no treatment; last A1C=5.9    Vertigo                          Allergies   Allergen Reactions    Other Medication Other (comments)       Allergy to vinegar \"my nose runs\" and allergy to yeast \"my nose gets stuffy;' red wine-runny nose       Red Wine Extract Other (comments)       Allergy to vinegar \"my nose runs\" and allergy to yeast \"my nose gets stuffy;' red wine-runny nose               Family History   Problem Relation Age of Onset    Glaucoma Mother      Hypertension Mother      Stroke Mother      Stroke Brother      Heart Disease Brother      Coronary Artery Disease Brother      Heart Disease Father      Cancer Father           esophageal    Coronary Artery Disease Father           Social History            Socioeconomic History    Marital status:        Spouse name: Not on file    Number of children: Not on file    Years of education: Not on file    Highest education level: Not on file   Occupational History    Not on file   Social Needs    Financial resource strain: Not on file    Food insecurity:       Worry: Not on file       Inability: Not on file    Transportation needs:       Medical: Not on file       Non-medical: Not on file   Tobacco Use    Smoking status: Former Smoker       Packs/day: 1.00       Years: 10.00       Pack years: 10.00       Last attempt to quit: 1977       Years since quittin.2    Smokeless tobacco: Never Used   Substance and Sexual Activity    Alcohol use: Yes       Alcohol/week: 3.5 oz       Types: 7 Glasses of wine per week    Drug use:  No       Types: Prescription, OTC  Sexual activity: Not Currently   Lifestyle    Physical activity:       Days per week: Not on file       Minutes per session: Not on file    Stress: Not on file   Relationships    Social connections:       Talks on phone: Not on file       Gets together: Not on file       Attends [employer] service: Not on file       Active member of club or organization: Not on file       Attends meetings of clubs or organizations: Not on file       Relationship status: Not on file    Intimate partner violence:       Fear of current or ex partner: Not on file       Emotionally abused: Not on file       Physically abused: Not on file       Forced sexual activity: Not on file   Other Topics Concern    Not on file   Social History Narrative    Not on file                Current Outpatient Medications   Medication Sig Dispense Refill    omeprazole (PRILOSEC) 20 mg capsule Take 20 mg by mouth daily.        b complex vitamins (B COMPLEX 1) tablet Take 1 Tab by mouth daily.        triamterene-hydroCHLOROthiazide (DYAZIDE) 37.5-25 mg per capsule TK 1 C PO QD   6    tadalafil (CIALIS) 5 mg tablet Take 5 mg by mouth.        LINZESS 72 mcg cap TK ONE C PO  QD ON AN EMPTY STOMACH AT LEAST 30 MIN BEFORE 1ST MEAL OF THE DAY   3    raNITIdine (ZANTAC) 150 mg tablet Take 150 mg by mouth.        atorvastatin (LIPITOR) 80 mg tablet Take 80 mg by mouth nightly.  Indications: hypercholesterolemia        multivitamin (ONE A DAY) tablet Take 1 Tab by mouth daily.        turmeric root extract 500 mg cap 1 per mouth daily        magnesium gluconate 500 mg (27 mg  elemental) tablet Take 500 mg by mouth every other day.        co-enzyme Q-10 (CO Q-10) 100 mg capsule Take 200 mg by mouth daily.        Cholecalciferol, Vitamin D3, (VITAMIN D) 1,000 unit Cap Take 2,000 Units by mouth daily.        ASPIRIN 81 mg Tab Take 81 mg by mouth nightly.        nitroglycerin (NITROSTAT) 0.4 mg SL tablet 0.4 mg by SubLINGual route every five (5) minutes as needed for Chest Pain. Indications: Angina        omega-3 fatty acids-vitamin e (FISH OIL) 1,000 mg cap Take 1 Cap by mouth daily.             Review of Systems     Constitutional:                    No recent weight changes, fever, fatigue, sleep difficulties, loss of appetite   ENT/Mouth:  No hearing loss, ringing in the ears, chronic sinus problem, nose bleeds,sore throat, voice change, hoarseness, swollen glands in neck, or difficulties with chewing and swallowing. Cardiovascular:  No chest pain/angina pectoris, palpitations, swelling of feet/ankles/hands, or calf pain while walking.      Respiratory: No chronic or frequent coughs, spitting up blood, shortness of breath, asthma, or wheezing.      Gastrointestinal: No a bdominal pain, POSheartburn, nausea, vomiting, constipation, or frequent diarrhea      Genitourinary: No frequent urination, burning or painful urination, or blood in urine      Musculoskeletal:   No POSjoint pain, POSstiffness/swelling, weakness of muscles, or muscle pain/cramp      Integument:   No rash/itching      Neurological:  No dizziness/vertigo, numbness/tingling sensation, tremors, weakness in limbs, frequent or recurring headaches, memory loss or confusion.         Endocrine: No excessive thirst or urination, heat or cold intolerance.                  Physical Exam     Cranial Nerves:   MARIYA, EOM's full, no nystagmus, no ptosis. Facial sensation is normal. Facial movement is symmetric. Hearing is normal bilaterally. Palate is midline with normal sternocleidomastoid and trapezius muscles are normal. Tongue is midline. Motor:  5/5 strength in upper and lower proximal and distal muscles. Normal bulk and tone. No fasciculations. Reflexes:    Not done   Sensory:   Normal to touch. Gait:  Normal gait. Tremor:   No tremor noted.    Cerebellar:  No cerebellar signs present.               FILMS: CTA head 5/16/2019 showed a collection of abnormal vessels on the tentorium cerebelli and drainage into the deep veins as well as several cortical veins. I believe this is a fistula and not an AVM.    Assessment & Plan:     1. Cerebral angiogram today for evaluation of AVM vs AVF with Dr. Treasure Kelley. 2. Pt to go home post procedure unless any acute changes.    3. Dr. Treasure Kelley to see pt prior to procedure.

## 2019-07-23 NOTE — OP NOTES
Procedure: Cerebral angiogram  Surgeon: Dr. 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

## 2019-08-02 ENCOUNTER — HOSPITAL ENCOUNTER (OUTPATIENT)
Dept: SURGERY | Age: 74
Discharge: HOME | End: 2019-08-02
Payer: MEDICARE

## 2019-08-02 ENCOUNTER — ANESTHESIA EVENT (OUTPATIENT)
Dept: SURGERY | Age: 74
DRG: 025 | End: 2019-08-02
Payer: MEDICARE

## 2019-08-02 VITALS
DIASTOLIC BLOOD PRESSURE: 66 MMHG | SYSTOLIC BLOOD PRESSURE: 123 MMHG | HEIGHT: 68 IN | TEMPERATURE: 97.8 F | BODY MASS INDEX: 22.13 KG/M2 | OXYGEN SATURATION: 98 % | RESPIRATION RATE: 18 BRPM | HEART RATE: 69 BPM | WEIGHT: 146 LBS

## 2019-08-02 DIAGNOSIS — I67.1 CEREBROVASCULAR DURAL AV FISTULA: Primary | ICD-10-CM

## 2019-08-02 DIAGNOSIS — I67.1 CEREBROVASCULAR DURAL AV FISTULA: ICD-10-CM

## 2019-08-02 DIAGNOSIS — D68.9 BLOOD CLOTTING DISORDER (HCC): ICD-10-CM

## 2019-08-02 LAB
ANION GAP SERPL CALC-SCNC: 7 MMOL/L (ref 7–16)
APTT PPP: 29.6 SEC (ref 24.7–39.8)
ASPIRIN TEST, ASPIRN: 414 ARU (ref 620–672)
BUN SERPL-MCNC: 16 MG/DL (ref 8–23)
CALCIUM SERPL-MCNC: 9.6 MG/DL (ref 8.3–10.4)
CHLORIDE SERPL-SCNC: 99 MMOL/L (ref 98–107)
CO2 SERPL-SCNC: 31 MMOL/L (ref 21–32)
CREAT SERPL-MCNC: 1.12 MG/DL (ref 0.8–1.5)
ERYTHROCYTE [DISTWIDTH] IN BLOOD BY AUTOMATED COUNT: 13.7 % (ref 11.9–14.6)
GLUCOSE SERPL-MCNC: 107 MG/DL (ref 65–100)
HCT VFR BLD AUTO: 41.1 % (ref 41.1–50.3)
HGB BLD-MCNC: 13.8 G/DL (ref 13.6–17.2)
INR PPP: 1
MCH RBC QN AUTO: 30.1 PG (ref 26.1–32.9)
MCHC RBC AUTO-ENTMCNC: 33.6 G/DL (ref 31.4–35)
MCV RBC AUTO: 89.5 FL (ref 79.6–97.8)
NRBC # BLD: 0 K/UL (ref 0–0.2)
PLATELET # BLD AUTO: 174 K/UL (ref 150–450)
PMV BLD AUTO: 10.7 FL (ref 9.4–12.3)
POTASSIUM SERPL-SCNC: 3.7 MMOL/L (ref 3.5–5.1)
PROTHROMBIN TIME: 13 SEC (ref 11.7–14.5)
RBC # BLD AUTO: 4.59 M/UL (ref 4.23–5.6)
SODIUM SERPL-SCNC: 137 MMOL/L (ref 136–145)
WBC # BLD AUTO: 7.9 K/UL (ref 4.3–11.1)

## 2019-08-02 PROCEDURE — 85610 PROTHROMBIN TIME: CPT

## 2019-08-02 PROCEDURE — 85576 BLOOD PLATELET AGGREGATION: CPT

## 2019-08-02 PROCEDURE — 80048 BASIC METABOLIC PNL TOTAL CA: CPT

## 2019-08-02 PROCEDURE — 85730 THROMBOPLASTIN TIME PARTIAL: CPT

## 2019-08-02 PROCEDURE — 85027 COMPLETE CBC AUTOMATED: CPT

## 2019-08-02 PROCEDURE — 36415 COLL VENOUS BLD VENIPUNCTURE: CPT

## 2019-08-02 RX ORDER — NAPROXEN SODIUM 220 MG
440 TABLET ORAL AS NEEDED
COMMUNITY
End: 2019-10-29

## 2019-08-02 NOTE — ANESTHESIA PREPROCEDURE EVALUATION
Anesthetic History   No history of anesthetic complications            Review of Systems / Medical History  Patient summary reviewed and pertinent labs reviewed    Pulmonary        Sleep apnea (not needed): No treatment  Smoker (quit 40 years)         Neuro/Psych         Psychiatric history (Depression)    Comments: AVM Cardiovascular              CAD, cardiac stents (x2 - '17 - on bASA) and hyperlipidemia    Exercise tolerance: >4 METS  Comments: 2018 TTE with preserved EF  Denies CP, SOB or changes in functional status   GI/Hepatic/Renal     GERD: well controlled      Liver disease (fatty liver)     Endo/Other    Diabetes (pre, no meds. )    Arthritis     Other Findings            Physical Exam    Airway  Mallampati: II  TM Distance: 4 - 6 cm  Neck ROM: normal range of motion        Cardiovascular  Regular rate and rhythm,  S1 and S2 normal,  no murmur, click, rub, or gallop  Rhythm: regular  Rate: normal         Dental    Dentition: Caps/crowns     Pulmonary  Breath sounds clear to auscultation               Abdominal  GI exam deferred       Other Findings            Anesthetic Plan    ASA: 3  Anesthesia type: total IV anesthesia    Monitoring Plan: Arterial line      Induction: Intravenous  Anesthetic plan and risks discussed with: Patient and Spouse

## 2019-08-02 NOTE — PERIOP NOTES
Patient verified name and . Patient provided medical/health information and PTA medications to the best of their ability. TYPE  CASE:lll   Order for consent NOT found in EHR at time of PAT visit. Unable to verify case posting against order; surgery verified by patient. found in EHR and matches case posting. Labs per surgeon:CBC, BMP, PT, PTT, ASA test.    Labs per anesthesia protocol: T&S on DOS. EKG  :  3/16/2019 NSR, Echo done 2018 EF 55-60%, has 2 cardiac stents placed in 2017, denies further cardiac events. Dr. Recio [place] came to assess patient and review chart. Patient was sent to lab for blood work. Patient provided with and instructed on education handouts including Guide to Surgery, blood transfusions, pain management, and hand hygiene for the family and community, and [facility] brochure. Soap and instructions given per hospital policy. Instructed patient to continue previous medications as prescribed prior to surgery unless otherwise directed and to take the following medications the day of surgery according to anesthesia guidelines : Zantac, ASA 81mg . Instructed patient to hold  the following medications: Vitamins and NSAIDS. Original medication prescription bottles were not visualized during patient appointment. Patient teach back successful and patient demonstrates knowledge of instruction. Patient was instructed to arrive at the [facility] hospital [place] on the morning of surgery.

## 2019-08-02 NOTE — PERIOP NOTES
Lab results reviewed and routed to surgeon.     Recent Results (from the past 12 hour(s))   ASPIRIN TEST    Collection Time: 08/02/19  2:17 PM   Result Value Ref Range    Aspirin test 414 (L) 620 - 672 ARU   CBC W/O DIFF    Collection Time: 08/02/19  2:17 PM   Result Value Ref Range    WBC 7.9 4.3 - 11.1 K/uL    RBC 4.59 4.23 - 5.6 M/uL    HGB 13.8 13.6 - 17.2 g/dL    HCT 41.1 41.1 - 50.3 %    MCV 89.5 79.6 - 97.8 FL    MCH 30.1 26.1 - 32.9 PG    MCHC 33.6 31.4 - 35.0 g/dL    RDW 13.7 11.9 - 14.6 %    PLATELET 973 790 - 570 K/uL    MPV 10.7 9.4 - 12.3 FL    ABSOLUTE NRBC 0.00 0.0 - 0.2 K/uL   METABOLIC PANEL, BASIC    Collection Time: 08/02/19  2:17 PM   Result Value Ref Range    Sodium 137 136 - 145 mmol/L    Potassium 3.7 3.5 - 5.1 mmol/L    Chloride 99 98 - 107 mmol/L    CO2 31 21 - 32 mmol/L    Anion gap 7 7 - 16 mmol/L    Glucose 107 (H) 65 - 100 mg/dL    BUN 16 8 - 23 MG/DL    Creatinine 1.12 0.8 - 1.5 MG/DL    GFR est AA >60 >60 ml/min/1.73m2    GFR est non-AA >60 >60 ml/min/1.73m2    Calcium 9.6 8.3 - 10.4 MG/DL   PROTHROMBIN TIME + INR    Collection Time: 08/02/19  2:17 PM   Result Value Ref Range    Prothrombin time 13.0 11.7 - 14.5 sec    INR 1.0     PTT    Collection Time: 08/02/19  2:17 PM   Result Value Ref Range    aPTT 29.6 24.7 - 39.8 SEC

## 2019-08-08 DIAGNOSIS — I67.1 DURAL ARTERIOVENOUS FISTULA: Primary | ICD-10-CM

## 2019-08-08 DIAGNOSIS — D68.9 BLOOD CLOTTING DISORDER (HCC): ICD-10-CM

## 2019-08-09 ENCOUNTER — APPOINTMENT (OUTPATIENT)
Dept: INTERVENTIONAL RADIOLOGY/VASCULAR | Age: 74
DRG: 025 | End: 2019-08-09
Attending: NEUROLOGICAL SURGERY
Payer: MEDICARE

## 2019-08-09 ENCOUNTER — HOSPITAL ENCOUNTER (INPATIENT)
Age: 74
LOS: 1 days | Discharge: HOME | DRG: 025 | End: 2019-08-10
Attending: NEUROLOGICAL SURGERY | Admitting: NEUROLOGICAL SURGERY
Payer: MEDICARE

## 2019-08-09 ENCOUNTER — ANESTHESIA (OUTPATIENT)
Dept: SURGERY | Age: 74
DRG: 025 | End: 2019-08-09
Payer: MEDICARE

## 2019-08-09 DIAGNOSIS — I67.1 DURAL ARTERIOVENOUS FISTULA: ICD-10-CM

## 2019-08-09 DIAGNOSIS — I67.1 CEREBROVASCULAR DURAL AV FISTULA: ICD-10-CM

## 2019-08-09 DIAGNOSIS — I67.1 CEREBROVASCULAR MALFORMATION, DURAL AV FISTULA: Primary | ICD-10-CM

## 2019-08-09 DIAGNOSIS — D68.9 BLOOD CLOTTING DISORDER (HCC): ICD-10-CM

## 2019-08-09 LAB
ABO + RH BLD: NORMAL
ANION GAP SERPL CALC-SCNC: 7 MMOL/L (ref 7–16)
APTT PPP: 31.2 SEC (ref 24.7–39.8)
BASE DEFICIT BLD-SCNC: 1 MMOL/L
BASOPHILS # BLD: 0 K/UL (ref 0–0.2)
BASOPHILS NFR BLD: 1 % (ref 0–2)
BLOOD GROUP ANTIBODIES SERPL: NORMAL
BUN SERPL-MCNC: 15 MG/DL (ref 8–23)
CA-I BLD-MCNC: 1.12 MMOL/L (ref 1.12–1.32)
CALCIUM SERPL-MCNC: 9.1 MG/DL (ref 8.3–10.4)
CHLORIDE SERPL-SCNC: 105 MMOL/L (ref 98–107)
CO2 SERPL-SCNC: 27 MMOL/L (ref 21–32)
CREAT SERPL-MCNC: 1.12 MG/DL (ref 0.8–1.5)
DIFFERENTIAL METHOD BLD: ABNORMAL
EOSINOPHIL # BLD: 0.2 K/UL (ref 0–0.8)
EOSINOPHIL NFR BLD: 5 % (ref 0.5–7.8)
ERYTHROCYTE [DISTWIDTH] IN BLOOD BY AUTOMATED COUNT: 13.8 % (ref 11.9–14.6)
GLUCOSE BLD STRIP.AUTO-MCNC: 114 MG/DL (ref 65–100)
GLUCOSE SERPL-MCNC: 101 MG/DL (ref 65–100)
HCO3 BLD-SCNC: 27.2 MMOL/L (ref 22–26)
HCT VFR BLD AUTO: 37.2 % (ref 41.1–50.3)
HGB BLD-MCNC: 12.3 G/DL (ref 13.6–17.2)
IMM GRANULOCYTES # BLD AUTO: 0 K/UL (ref 0–0.5)
IMM GRANULOCYTES NFR BLD AUTO: 0 % (ref 0–5)
INR PPP: 1
LYMPHOCYTES # BLD: 1.7 K/UL (ref 0.5–4.6)
LYMPHOCYTES NFR BLD: 39 % (ref 13–44)
MCH RBC QN AUTO: 30.4 PG (ref 26.1–32.9)
MCHC RBC AUTO-ENTMCNC: 33.1 G/DL (ref 31.4–35)
MCV RBC AUTO: 91.9 FL (ref 79.6–97.8)
MONOCYTES # BLD: 0.5 K/UL (ref 0.1–1.3)
MONOCYTES NFR BLD: 12 % (ref 4–12)
NEUTS SEG # BLD: 1.9 K/UL (ref 1.7–8.2)
NEUTS SEG NFR BLD: 44 % (ref 43–78)
NRBC # BLD: 0 K/UL (ref 0–0.2)
PCO2 BLD: 62.6 MMHG (ref 35–45)
PH BLD: 7.25 [PH] (ref 7.35–7.45)
PLATELET # BLD AUTO: 155 K/UL (ref 150–450)
PMV BLD AUTO: 11.1 FL (ref 9.4–12.3)
PO2 BLD: 532 MMHG (ref 75–100)
POTASSIUM BLD-SCNC: 3.9 MMOL/L (ref 3.5–5.1)
POTASSIUM SERPL-SCNC: 3.9 MMOL/L (ref 3.5–5.1)
PROTHROMBIN TIME: 13.3 SEC (ref 11.7–14.5)
RBC # BLD AUTO: 4.05 M/UL (ref 4.23–5.6)
SAO2 % BLD: 100 % (ref 95–98)
SODIUM BLD-SCNC: 141 MMOL/L (ref 136–145)
SODIUM SERPL-SCNC: 139 MMOL/L (ref 136–145)
SPECIMEN EXP DATE BLD: NORMAL
WBC # BLD AUTO: 4.4 K/UL (ref 4.3–11.1)

## 2019-08-09 PROCEDURE — 77030012468 HC VLV BLEEDBK CNTRL ABBT -B

## 2019-08-09 PROCEDURE — 86900 BLOOD TYPING SEROLOGIC ABO: CPT

## 2019-08-09 PROCEDURE — 61624 TCAT PERM OCCLS/EMBOLJ CNS: CPT | Performed by: NEUROLOGICAL SURGERY

## 2019-08-09 PROCEDURE — 36415 COLL VENOUS BLD VENIPUNCTURE: CPT

## 2019-08-09 PROCEDURE — L1830 KO IMMOB CANVAS LONG PRE OTS: HCPCS

## 2019-08-09 PROCEDURE — 85730 THROMBOPLASTIN TIME PARTIAL: CPT

## 2019-08-09 PROCEDURE — C1769 GUIDE WIRE: HCPCS

## 2019-08-09 PROCEDURE — 76060000038 HC ANESTHESIA 3.5 TO 4 HR: Performed by: NEUROLOGICAL SURGERY

## 2019-08-09 PROCEDURE — 85025 COMPLETE CBC W/AUTO DIFF WBC: CPT

## 2019-08-09 PROCEDURE — 77030020782 HC GWN BAIR PAWS FLX 3M -B: Performed by: ANESTHESIOLOGY

## 2019-08-09 PROCEDURE — 77030013292 HC BOWL MX PRSM J&J -A: Performed by: ANESTHESIOLOGY

## 2019-08-09 PROCEDURE — 74011250636 HC RX REV CODE- 250/636

## 2019-08-09 PROCEDURE — C1894 INTRO/SHEATH, NON-LASER: HCPCS

## 2019-08-09 PROCEDURE — 77030039425 HC BLD LARYNG TRULITE DISP TELE -A: Performed by: ANESTHESIOLOGY

## 2019-08-09 PROCEDURE — 74011250636 HC RX REV CODE- 250/636: Performed by: ANESTHESIOLOGY

## 2019-08-09 PROCEDURE — 77030037088 HC TUBE ENDOTRACH ORAL NSL COVD-A: Performed by: ANESTHESIOLOGY

## 2019-08-09 PROCEDURE — C1760 CLOSURE DEV, VASC: HCPCS

## 2019-08-09 PROCEDURE — 80048 BASIC METABOLIC PNL TOTAL CA: CPT

## 2019-08-09 PROCEDURE — 74011000250 HC RX REV CODE- 250

## 2019-08-09 PROCEDURE — 65610000001 HC ROOM ICU GENERAL

## 2019-08-09 PROCEDURE — 77030005401 HC CATH RAD ARRO -A: Performed by: ANESTHESIOLOGY

## 2019-08-09 PROCEDURE — 77030005518 HC CATH URETH FOL 2W BARD -B

## 2019-08-09 PROCEDURE — 76010000134 HC OR TIME 3.5 TO 4 HR: Performed by: NEUROLOGICAL SURGERY

## 2019-08-09 PROCEDURE — 03LG3DZ OCCLUSION OF INTRACRANIAL ARTERY WITH INTRALUMINAL DEVICE, PERCUTANEOUS APPROACH: ICD-10-PCS | Performed by: NEUROLOGICAL SURGERY

## 2019-08-09 PROCEDURE — 74011250637 HC RX REV CODE- 250/637: Performed by: NURSE PRACTITIONER

## 2019-08-09 PROCEDURE — 76937 US GUIDE VASCULAR ACCESS: CPT

## 2019-08-09 PROCEDURE — 74011250636 HC RX REV CODE- 250/636: Performed by: NEUROLOGICAL SURGERY

## 2019-08-09 PROCEDURE — 85576 BLOOD PLATELET AGGREGATION: CPT

## 2019-08-09 PROCEDURE — 77030013794 HC KT TRNSDUC BLD EDWD -B: Performed by: ANESTHESIOLOGY

## 2019-08-09 PROCEDURE — 76210000063 HC OR PH I REC FIRST 0.5 HR: Performed by: NEUROLOGICAL SURGERY

## 2019-08-09 PROCEDURE — 77030032490 HC SLV COMPR SCD KNE COVD -B: Performed by: NEUROLOGICAL SURGERY

## 2019-08-09 PROCEDURE — 77030003629 HC NDL PERC VASC COOK -A

## 2019-08-09 PROCEDURE — 74011636320 HC RX REV CODE- 636/320: Performed by: NEUROLOGICAL SURGERY

## 2019-08-09 PROCEDURE — 82947 ASSAY GLUCOSE BLOOD QUANT: CPT

## 2019-08-09 PROCEDURE — 75894 X-RAYS TRANSCATH THERAPY: CPT | Performed by: NEUROLOGICAL SURGERY

## 2019-08-09 PROCEDURE — 77030020407 HC IV BLD WRMR ST 3M -A: Performed by: ANESTHESIOLOGY

## 2019-08-09 PROCEDURE — 77030032490 HC SLV COMPR SCD KNE COVD -B

## 2019-08-09 PROCEDURE — C1887 CATHETER, GUIDING: HCPCS

## 2019-08-09 PROCEDURE — 82803 BLOOD GASES ANY COMBINATION: CPT

## 2019-08-09 PROCEDURE — 77030019908 HC STETH ESOPH SIMS -A: Performed by: ANESTHESIOLOGY

## 2019-08-09 PROCEDURE — 75898 FOLLOW-UP ANGIOGRAPHY: CPT | Performed by: NEUROLOGICAL SURGERY

## 2019-08-09 PROCEDURE — 36224 PLACE CATH CAROTD ART: CPT | Performed by: NEUROLOGICAL SURGERY

## 2019-08-09 PROCEDURE — 77030025847 HC SYS EMB LIQ ONYX MEDT -I

## 2019-08-09 PROCEDURE — 36226 PLACE CATH VERTEBRAL ART: CPT | Performed by: NEUROLOGICAL SURGERY

## 2019-08-09 PROCEDURE — 85610 PROTHROMBIN TIME: CPT

## 2019-08-09 PROCEDURE — 77030004566 HC CATH ANGI DX TORCON COOK -B

## 2019-08-09 DEVICE — 105-7100-060 ONYX 18 KIT AVM US
Type: IMPLANTABLE DEVICE | Site: BRAIN | Status: FUNCTIONAL
Brand: ONYX 18

## 2019-08-09 RX ORDER — SODIUM CHLORIDE, SODIUM LACTATE, POTASSIUM CHLORIDE, CALCIUM CHLORIDE 600; 310; 30; 20 MG/100ML; MG/100ML; MG/100ML; MG/100ML
INJECTION, SOLUTION INTRAVENOUS
Status: DISCONTINUED | OUTPATIENT
Start: 2019-08-09 | End: 2019-08-09 | Stop reason: HOSPADM

## 2019-08-09 RX ORDER — ATORVASTATIN CALCIUM 40 MG/1
80 TABLET, FILM COATED ORAL
Status: DISCONTINUED | OUTPATIENT
Start: 2019-08-09 | End: 2019-08-10 | Stop reason: HOSPADM

## 2019-08-09 RX ORDER — HYDROCODONE BITARTRATE AND ACETAMINOPHEN 7.5; 325 MG/1; MG/1
1 TABLET ORAL
Status: DISCONTINUED | OUTPATIENT
Start: 2019-08-09 | End: 2019-08-10 | Stop reason: HOSPADM

## 2019-08-09 RX ORDER — ONDANSETRON 2 MG/ML
4 INJECTION INTRAMUSCULAR; INTRAVENOUS
Status: DISCONTINUED | OUTPATIENT
Start: 2019-08-09 | End: 2019-08-09 | Stop reason: HOSPADM

## 2019-08-09 RX ORDER — REMIFENTANIL HYDROCHLORIDE 1 MG/ML
INJECTION, POWDER, LYOPHILIZED, FOR SOLUTION INTRAVENOUS
Status: DISCONTINUED | OUTPATIENT
Start: 2019-08-09 | End: 2019-08-09 | Stop reason: HOSPADM

## 2019-08-09 RX ORDER — MIDAZOLAM HYDROCHLORIDE 1 MG/ML
2 INJECTION, SOLUTION INTRAMUSCULAR; INTRAVENOUS
Status: DISCONTINUED | OUTPATIENT
Start: 2019-08-09 | End: 2019-08-09 | Stop reason: HOSPADM

## 2019-08-09 RX ORDER — SODIUM CHLORIDE 0.9 % (FLUSH) 0.9 %
5-40 SYRINGE (ML) INJECTION EVERY 8 HOURS
Status: DISCONTINUED | OUTPATIENT
Start: 2019-08-09 | End: 2019-08-10 | Stop reason: HOSPADM

## 2019-08-09 RX ORDER — SODIUM CHLORIDE, SODIUM LACTATE, POTASSIUM CHLORIDE, CALCIUM CHLORIDE 600; 310; 30; 20 MG/100ML; MG/100ML; MG/100ML; MG/100ML
25 INJECTION, SOLUTION INTRAVENOUS CONTINUOUS
Status: DISCONTINUED | OUTPATIENT
Start: 2019-08-09 | End: 2019-08-09 | Stop reason: HOSPADM

## 2019-08-09 RX ORDER — ONDANSETRON 2 MG/ML
INJECTION INTRAMUSCULAR; INTRAVENOUS AS NEEDED
Status: DISCONTINUED | OUTPATIENT
Start: 2019-08-09 | End: 2019-08-09 | Stop reason: HOSPADM

## 2019-08-09 RX ORDER — SODIUM CHLORIDE 0.9 % (FLUSH) 0.9 %
5-40 SYRINGE (ML) INJECTION AS NEEDED
Status: DISCONTINUED | OUTPATIENT
Start: 2019-08-09 | End: 2019-08-09 | Stop reason: HOSPADM

## 2019-08-09 RX ORDER — PROPOFOL 10 MG/ML
INJECTION, EMULSION INTRAVENOUS AS NEEDED
Status: DISCONTINUED | OUTPATIENT
Start: 2019-08-09 | End: 2019-08-09 | Stop reason: HOSPADM

## 2019-08-09 RX ORDER — MIDAZOLAM HYDROCHLORIDE 1 MG/ML
2 INJECTION, SOLUTION INTRAMUSCULAR; INTRAVENOUS ONCE
Status: DISCONTINUED | OUTPATIENT
Start: 2019-08-09 | End: 2019-08-09 | Stop reason: HOSPADM

## 2019-08-09 RX ORDER — SODIUM CHLORIDE 0.9 % (FLUSH) 0.9 %
5-40 SYRINGE (ML) INJECTION EVERY 8 HOURS
Status: DISCONTINUED | OUTPATIENT
Start: 2019-08-09 | End: 2019-08-09 | Stop reason: HOSPADM

## 2019-08-09 RX ORDER — HEPARIN SODIUM 5000 [USP'U]/ML
INJECTION, SOLUTION INTRAVENOUS; SUBCUTANEOUS AS NEEDED
Status: DISCONTINUED | OUTPATIENT
Start: 2019-08-09 | End: 2019-08-09 | Stop reason: HOSPADM

## 2019-08-09 RX ORDER — LIDOCAINE HYDROCHLORIDE 20 MG/ML
INJECTION, SOLUTION EPIDURAL; INFILTRATION; INTRACAUDAL; PERINEURAL AS NEEDED
Status: DISCONTINUED | OUTPATIENT
Start: 2019-08-09 | End: 2019-08-09 | Stop reason: HOSPADM

## 2019-08-09 RX ORDER — PANTOPRAZOLE SODIUM 40 MG/1
40 TABLET, DELAYED RELEASE ORAL
Status: DISCONTINUED | OUTPATIENT
Start: 2019-08-10 | End: 2019-08-10 | Stop reason: HOSPADM

## 2019-08-09 RX ORDER — SUCCINYLCHOLINE CHLORIDE 20 MG/ML
INJECTION INTRAMUSCULAR; INTRAVENOUS AS NEEDED
Status: DISCONTINUED | OUTPATIENT
Start: 2019-08-09 | End: 2019-08-09 | Stop reason: HOSPADM

## 2019-08-09 RX ORDER — NALOXONE HYDROCHLORIDE 0.4 MG/ML
0.2 INJECTION, SOLUTION INTRAMUSCULAR; INTRAVENOUS; SUBCUTANEOUS
Status: DISCONTINUED | OUTPATIENT
Start: 2019-08-09 | End: 2019-08-09 | Stop reason: HOSPADM

## 2019-08-09 RX ORDER — FENTANYL CITRATE 50 UG/ML
INJECTION, SOLUTION INTRAMUSCULAR; INTRAVENOUS AS NEEDED
Status: DISCONTINUED | OUTPATIENT
Start: 2019-08-09 | End: 2019-08-09 | Stop reason: HOSPADM

## 2019-08-09 RX ORDER — SODIUM CHLORIDE 0.9 % (FLUSH) 0.9 %
5-40 SYRINGE (ML) INJECTION AS NEEDED
Status: DISCONTINUED | OUTPATIENT
Start: 2019-08-09 | End: 2019-08-10 | Stop reason: HOSPADM

## 2019-08-09 RX ORDER — TAMSULOSIN HYDROCHLORIDE 0.4 MG/1
0.4 CAPSULE ORAL
Status: DISCONTINUED | OUTPATIENT
Start: 2019-08-09 | End: 2019-08-10 | Stop reason: HOSPADM

## 2019-08-09 RX ORDER — EPHEDRINE SULFATE 50 MG/ML
INJECTION, SOLUTION INTRAVENOUS AS NEEDED
Status: DISCONTINUED | OUTPATIENT
Start: 2019-08-09 | End: 2019-08-09 | Stop reason: HOSPADM

## 2019-08-09 RX ORDER — LIDOCAINE HYDROCHLORIDE 10 MG/ML
0.1 INJECTION INFILTRATION; PERINEURAL AS NEEDED
Status: DISCONTINUED | OUTPATIENT
Start: 2019-08-09 | End: 2019-08-09 | Stop reason: HOSPADM

## 2019-08-09 RX ORDER — FENTANYL CITRATE 50 UG/ML
100 INJECTION, SOLUTION INTRAMUSCULAR; INTRAVENOUS ONCE
Status: DISCONTINUED | OUTPATIENT
Start: 2019-08-09 | End: 2019-08-09 | Stop reason: HOSPADM

## 2019-08-09 RX ORDER — DEXAMETHASONE SODIUM PHOSPHATE 100 MG/10ML
INJECTION INTRAMUSCULAR; INTRAVENOUS AS NEEDED
Status: DISCONTINUED | OUTPATIENT
Start: 2019-08-09 | End: 2019-08-09 | Stop reason: HOSPADM

## 2019-08-09 RX ORDER — ACETAMINOPHEN 325 MG/1
650 TABLET ORAL
Status: DISCONTINUED | OUTPATIENT
Start: 2019-08-09 | End: 2019-08-10 | Stop reason: HOSPADM

## 2019-08-09 RX ORDER — PROPOFOL 10 MG/ML
INJECTION, EMULSION INTRAVENOUS
Status: DISCONTINUED | OUTPATIENT
Start: 2019-08-09 | End: 2019-08-09 | Stop reason: HOSPADM

## 2019-08-09 RX ORDER — ROCURONIUM BROMIDE 10 MG/ML
INJECTION, SOLUTION INTRAVENOUS AS NEEDED
Status: DISCONTINUED | OUTPATIENT
Start: 2019-08-09 | End: 2019-08-09 | Stop reason: HOSPADM

## 2019-08-09 RX ORDER — LIDOCAINE HYDROCHLORIDE 20 MG/ML
INJECTION, SOLUTION INFILTRATION; PERINEURAL AS NEEDED
Status: DISCONTINUED | OUTPATIENT
Start: 2019-08-09 | End: 2019-08-09 | Stop reason: HOSPADM

## 2019-08-09 RX ORDER — CLONAZEPAM 0.5 MG/1
0.25 TABLET ORAL
Status: DISCONTINUED | OUTPATIENT
Start: 2019-08-09 | End: 2019-08-10 | Stop reason: HOSPADM

## 2019-08-09 RX ORDER — ONDANSETRON 2 MG/ML
4 INJECTION INTRAMUSCULAR; INTRAVENOUS
Status: DISCONTINUED | OUTPATIENT
Start: 2019-08-09 | End: 2019-08-10 | Stop reason: HOSPADM

## 2019-08-09 RX ORDER — ASPIRIN 81 MG/1
81 TABLET ORAL DAILY
Status: DISCONTINUED | OUTPATIENT
Start: 2019-08-10 | End: 2019-08-10 | Stop reason: HOSPADM

## 2019-08-09 RX ADMIN — FENTANYL CITRATE 100 MCG: 50 INJECTION, SOLUTION INTRAMUSCULAR; INTRAVENOUS at 08:09

## 2019-08-09 RX ADMIN — REMIFENTANIL HYDROCHLORIDE 0.2 MCG/KG/MIN: 1 INJECTION, POWDER, LYOPHILIZED, FOR SOLUTION INTRAVENOUS at 08:18

## 2019-08-09 RX ADMIN — ROCURONIUM BROMIDE 5 MG: 10 INJECTION, SOLUTION INTRAVENOUS at 08:16

## 2019-08-09 RX ADMIN — DEXAMETHASONE SODIUM PHOSPHATE 10 MG: 100 INJECTION INTRAMUSCULAR; INTRAVENOUS at 10:35

## 2019-08-09 RX ADMIN — PROPOFOL 50 MG: 10 INJECTION, EMULSION INTRAVENOUS at 08:19

## 2019-08-09 RX ADMIN — CLONAZEPAM 0.25 MG: 0.5 TABLET ORAL at 21:55

## 2019-08-09 RX ADMIN — Medication 10 ML: at 15:25

## 2019-08-09 RX ADMIN — PROPOFOL 250 MCG/KG/MIN: 10 INJECTION, EMULSION INTRAVENOUS at 08:16

## 2019-08-09 RX ADMIN — EPHEDRINE SULFATE 10 MG: 50 INJECTION, SOLUTION INTRAVENOUS at 08:45

## 2019-08-09 RX ADMIN — ONDANSETRON 4 MG: 2 INJECTION INTRAMUSCULAR; INTRAVENOUS at 10:35

## 2019-08-09 RX ADMIN — SODIUM CHLORIDE, SODIUM LACTATE, POTASSIUM CHLORIDE, AND CALCIUM CHLORIDE: 600; 310; 30; 20 INJECTION, SOLUTION INTRAVENOUS at 08:09

## 2019-08-09 RX ADMIN — SODIUM CHLORIDE, SODIUM LACTATE, POTASSIUM CHLORIDE, AND CALCIUM CHLORIDE 25 ML/HR: 600; 310; 30; 20 INJECTION, SOLUTION INTRAVENOUS at 06:45

## 2019-08-09 RX ADMIN — ATORVASTATIN CALCIUM 80 MG: 40 TABLET, FILM COATED ORAL at 21:55

## 2019-08-09 RX ADMIN — SUCCINYLCHOLINE CHLORIDE 140 MG: 20 INJECTION INTRAMUSCULAR; INTRAVENOUS at 08:16

## 2019-08-09 RX ADMIN — LIDOCAINE HYDROCHLORIDE 80 MG: 20 INJECTION, SOLUTION EPIDURAL; INFILTRATION; INTRACAUDAL; PERINEURAL at 08:16

## 2019-08-09 RX ADMIN — PROPOFOL 180 MG: 10 INJECTION, EMULSION INTRAVENOUS at 08:16

## 2019-08-09 RX ADMIN — SODIUM CHLORIDE, SODIUM LACTATE, POTASSIUM CHLORIDE, CALCIUM CHLORIDE: 600; 310; 30; 20 INJECTION, SOLUTION INTRAVENOUS at 08:21

## 2019-08-09 RX ADMIN — Medication 10 ML: at 21:56

## 2019-08-09 RX ADMIN — TAMSULOSIN HYDROCHLORIDE 0.4 MG: 0.4 CAPSULE ORAL at 21:55

## 2019-08-09 NOTE — PROGRESS NOTES
1200 Pt arrives to PACU with OR RN, CRNA, & EndoRN; report given to PACU RN regarding procedure, PMH, POC. EndoRN to stay at bedside until patient more awake.     1215 Pt arrives to ICU; [facility] [address] [facility]

## 2019-08-09 NOTE — OP NOTES
Procedure: Cerebral angiogram  Surgeon: Dr. Kaylah Ty  Assistant: Ramya Meza NP  Pre-op Dx: Embolization of dural AVF  Post-op Dx: same  Anesthesia: General anesthesia  Complications: None  Findings: Dural AVF supplied by bilateral ECAs and the vertebral artery

## 2019-08-09 NOTE — PROGRESS NOTES
Patient arrived from PACU via RN and Taras Nicole RN. Pt extremely drowsy with anesthesia but will respond to noxious stimuli and very loud voices. Pt attempting to follow commands. Vitals stable. Lungs CTAB with no adventitious sounds. R groin site CDI with no ssx bleeding/hematoma. Distal pulses equal bilaterally. R leg warm. Dual skin assessment performed with Robinson Challenger. No noted pressure injuries or wounds. Pt entire body with small moles and brown plaque like lesions. Dr Treasure Kelley and Meryl Bautista NP at bedside and agree if pt mental status does not improve in an hour to inform them and will obtain CT perfusion.

## 2019-08-09 NOTE — PERIOP NOTES
Pt accompanied by Neuro RN Flori. Report to ICU given by Flori COLIN and transferred in her care. Additional charting to be completed by [facility] regarding patient's neuro status.

## 2019-08-09 NOTE — PROGRESS NOTES
Incorrect US performed and not everything MD wished to be imaged was performed. Complete ultrasound to be done prior to formulation of plan of care.

## 2019-08-09 NOTE — PROGRESS NOTES
Chart reviewed and pt seen in ICU s/p Cerebral angiogram by Dr. Win. Demographics confirmed. No needs at present voiced per pt. CM will follow for any d/c needs per MD.     Care Management Interventions  PCP Verified by CM: Yes  Mode of Transport at Discharge:  Other (see comment)  Transition of Care Consult (CM Consult): Discharge Planning  Current Support Network: Lives with Spouse, Own Home  Confirm Follow Up Transport: Self  Plan discussed with Pt/Family/Caregiver: Yes  Freedom of Choice Offered: Yes  [address] Provided?: (MCR/supplemental)  Discharge Location  Discharge Placement: Home

## 2019-08-09 NOTE — ANESTHESIA POSTPROCEDURE EVALUATION
Procedure(s):  EMOBLIZATION OF DURAL AV FISTULA / NEURO MONITORING. total IV anesthesia    Anesthesia Post Evaluation      Multimodal analgesia: multimodal analgesia used between 6 hours prior to anesthesia start to PACU discharge  Patient location during evaluation: bedside  Patient participation: complete - patient participated  Level of consciousness: sleepy but conscious  Pain score: 0  Pain management: adequate  Airway patency: patent  Anesthetic complications: no  Cardiovascular status: acceptable  Respiratory status: acceptable  Hydration status: acceptable  Comments: Patient doing well. Continue care in ICU. Moves all ext. Post anesthesia nausea and vomiting:  none      Vitals Value Taken Time   /63 8/9/2019 12:13 PM   Temp 36.3 °C (97.4 °F) 8/9/2019 11:56 AM   Pulse 66 8/9/2019 12:15 PM   Resp 16 8/9/2019 12:13 PM   SpO2 99 % 8/9/2019 12:13 PM   Vitals shown include unvalidated device data.

## 2019-08-09 NOTE — ANESTHESIA PROCEDURE NOTES
Arterial Line Placement    Start time: 8/9/2019 8:16 AM  End time: 8/9/2019 8:18 AM  Performed by: Max Medina CRNA  Authorized by: Jess Epperson MD     Pre-Procedure  Indications:  Arterial pressure monitoring and blood sampling  Preanesthetic Checklist: patient identified, risks and benefits discussed, anesthesia consent, site marked, patient being monitored, timeout performed and patient being monitored    Timeout Time: 08:16        Procedure:   Prep:  ChloraPrep  Seldinger Technique?: Yes    Orientation:  Left  Location:  Radial artery  Catheter size:  20 G  Number of attempts:  1    Assessment:   Post-procedure:  Line secured and sterile dressing applied  Patient Tolerance:  Patient tolerated the procedure well with no immediate complications  Comment:   .

## 2019-08-10 ENCOUNTER — APPOINTMENT (OUTPATIENT)
Dept: CT IMAGING | Age: 74
DRG: 025 | End: 2019-08-10
Attending: NURSE PRACTITIONER
Payer: MEDICARE

## 2019-08-10 VITALS
BODY MASS INDEX: 23.17 KG/M2 | RESPIRATION RATE: 33 BRPM | DIASTOLIC BLOOD PRESSURE: 57 MMHG | OXYGEN SATURATION: 98 % | HEART RATE: 63 BPM | WEIGHT: 152.4 LBS | TEMPERATURE: 97.8 F | SYSTOLIC BLOOD PRESSURE: 120 MMHG

## 2019-08-10 LAB
ANION GAP SERPL CALC-SCNC: 7 MMOL/L (ref 7–16)
BASOPHILS # BLD: 0 K/UL (ref 0–0.2)
BASOPHILS NFR BLD: 0 % (ref 0–2)
BUN SERPL-MCNC: 10 MG/DL (ref 8–23)
CALCIUM SERPL-MCNC: 8.5 MG/DL (ref 8.3–10.4)
CHLORIDE SERPL-SCNC: 108 MMOL/L (ref 98–107)
CO2 SERPL-SCNC: 26 MMOL/L (ref 21–32)
CREAT SERPL-MCNC: 0.88 MG/DL (ref 0.8–1.5)
DIFFERENTIAL METHOD BLD: ABNORMAL
EOSINOPHIL # BLD: 0 K/UL (ref 0–0.8)
EOSINOPHIL NFR BLD: 0 % (ref 0.5–7.8)
ERYTHROCYTE [DISTWIDTH] IN BLOOD BY AUTOMATED COUNT: 13.6 % (ref 11.9–14.6)
GLUCOSE SERPL-MCNC: 150 MG/DL (ref 65–100)
HCT VFR BLD AUTO: 36.4 % (ref 41.1–50.3)
HGB BLD-MCNC: 12.4 G/DL (ref 13.6–17.2)
IMM GRANULOCYTES # BLD AUTO: 0.1 K/UL (ref 0–0.5)
IMM GRANULOCYTES NFR BLD AUTO: 1 % (ref 0–5)
LYMPHOCYTES # BLD: 1 K/UL (ref 0.5–4.6)
LYMPHOCYTES NFR BLD: 9 % (ref 13–44)
MCH RBC QN AUTO: 30.6 PG (ref 26.1–32.9)
MCHC RBC AUTO-ENTMCNC: 34.1 G/DL (ref 31.4–35)
MCV RBC AUTO: 89.9 FL (ref 79.6–97.8)
MONOCYTES # BLD: 0.6 K/UL (ref 0.1–1.3)
MONOCYTES NFR BLD: 6 % (ref 4–12)
NEUTS SEG # BLD: 9 K/UL (ref 1.7–8.2)
NEUTS SEG NFR BLD: 84 % (ref 43–78)
NRBC # BLD: 0 K/UL (ref 0–0.2)
PLATELET # BLD AUTO: 155 K/UL (ref 150–450)
PMV BLD AUTO: 10.6 FL (ref 9.4–12.3)
POTASSIUM SERPL-SCNC: 3.2 MMOL/L (ref 3.5–5.1)
RBC # BLD AUTO: 4.05 M/UL (ref 4.23–5.6)
SODIUM SERPL-SCNC: 141 MMOL/L (ref 136–145)
WBC # BLD AUTO: 10.7 K/UL (ref 4.3–11.1)

## 2019-08-10 PROCEDURE — 74011250637 HC RX REV CODE- 250/637: Performed by: NURSE PRACTITIONER

## 2019-08-10 PROCEDURE — 80048 BASIC METABOLIC PNL TOTAL CA: CPT

## 2019-08-10 PROCEDURE — 70450 CT HEAD/BRAIN W/O DYE: CPT

## 2019-08-10 PROCEDURE — 85025 COMPLETE CBC W/AUTO DIFF WBC: CPT

## 2019-08-10 PROCEDURE — 74011000258 HC RX REV CODE- 258: Performed by: NEUROLOGICAL SURGERY

## 2019-08-10 PROCEDURE — 36600 WITHDRAWAL OF ARTERIAL BLOOD: CPT

## 2019-08-10 PROCEDURE — 99238 HOSP IP/OBS DSCHRG MGMT 30/<: CPT | Performed by: NURSE PRACTITIONER

## 2019-08-10 PROCEDURE — 70496 CT ANGIOGRAPHY HEAD: CPT

## 2019-08-10 PROCEDURE — 74011636320 HC RX REV CODE- 636/320: Performed by: NEUROLOGICAL SURGERY

## 2019-08-10 RX ORDER — METOPROLOL TARTRATE 50 MG/1
50 TABLET ORAL 2 TIMES DAILY
Qty: 60 TAB | Refills: 1 | Status: SHIPPED | OUTPATIENT
Start: 2019-08-10 | End: 2020-03-03 | Stop reason: DRUGHIGH

## 2019-08-10 RX ORDER — SODIUM CHLORIDE 0.9 % (FLUSH) 0.9 %
10 SYRINGE (ML) INJECTION
Status: COMPLETED | OUTPATIENT
Start: 2019-08-10 | End: 2019-08-10

## 2019-08-10 RX ORDER — METOPROLOL TARTRATE 50 MG/1
50 TABLET ORAL EVERY 12 HOURS
Status: DISCONTINUED | OUTPATIENT
Start: 2019-08-10 | End: 2019-08-10 | Stop reason: HOSPADM

## 2019-08-10 RX ORDER — POTASSIUM CHLORIDE 1.5 G/1.77G
40 POWDER, FOR SOLUTION ORAL 2 TIMES DAILY
Status: DISCONTINUED | OUTPATIENT
Start: 2019-08-10 | End: 2019-08-10 | Stop reason: HOSPADM

## 2019-08-10 RX ADMIN — Medication 10 ML: at 07:04

## 2019-08-10 RX ADMIN — POTASSIUM CHLORIDE 40 MEQ: 1.5 POWDER, FOR SOLUTION ORAL at 08:12

## 2019-08-10 RX ADMIN — IOPAMIDOL 100 ML: 755 INJECTION, SOLUTION INTRAVENOUS at 04:19

## 2019-08-10 RX ADMIN — METOPROLOL TARTRATE 50 MG: 50 TABLET ORAL at 08:12

## 2019-08-10 RX ADMIN — ASPIRIN 81 MG: 81 TABLET ORAL at 08:12

## 2019-08-10 RX ADMIN — SODIUM CHLORIDE 100 ML: 900 INJECTION, SOLUTION INTRAVENOUS at 04:19

## 2019-08-10 RX ADMIN — PANTOPRAZOLE SODIUM 40 MG: 40 TABLET, DELAYED RELEASE ORAL at 08:12

## 2019-08-10 RX ADMIN — Medication 10 ML: at 04:19

## 2019-08-10 NOTE — PROGRESS NOTES
Pt being d/c home. D/C instructions and teaching performed. Time given for questions and all questions answered to patient, son, and spouse. Pt without distress. No ssx bleeding/hematoma from arterial line site, R groin, or IVs.  D/c information provided. Patient wheeled out to D/C loop by RN.

## 2019-08-10 NOTE — DISCHARGE SUMMARY
Discharge Summary     Patient: Brennan Sesay. MRN: [id number]  SSN: [id number]    YOB: 1945  Age: 66 y.o.   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]    Discharge Diagnoses:   Problem List as of 8/10/2019 Date Reviewed: 8/9/2019          Codes Class Noted - Resolved    * (Principal) Cerebrovascular malformation, dural AV fistula ICD-10-CM: I67.1  ICD-9-CM: 437.3  8/9/2019 - Present        Pain of right thumb ICD-10-CM: M79.644  ICD-9-CM: 729.5  4/1/2019 - Present        Carpal tunnel syndrome of right wrist ICD-10-CM: G56.01  ICD-9-CM: 354.0  4/1/2019 - Present        DREA (obstructive sleep apnea) ICD-10-CM: G47.33  ICD-9-CM: 327.23  4/23/2018 - Present        Hypersomnolence ICD-10-CM: G47.10  ICD-9-CM: 780.54  2/13/2017 - Present        Memory change ICD-10-CM: R41.3  ICD-9-CM: 780.93  11/3/2015 - Present        Insomnia, unspecified ICD-10-CM: G47.00  ICD-9-CM: 780.52  7/22/2015 - Present        Type II or unspecified type diabetes mellitus without mention of complication, not stated as uncontrolled ICD-10-CM: E11.9  ICD-9-CM: 250.00  7/22/2015 - Present        Depressive disorder, not elsewhere classified ICD-10-CM: F32.9  ICD-9-CM: 097  7/22/2015 - Present        Bilateral inguinal hernia ICD-10-CM: K40.20  ICD-9-CM: 550.92  1/8/2014 - Present        Osteoporosis ICD-10-CM: M81.0  ICD-9-CM: 733.00  11/15/2013 - Present        Splitting of urinary stream ICD-10-CM: R39.13  ICD-9-CM: 788.61  11/15/2013 - Present        Nodular prostate with urinary obstruction ICD-10-CM: N40.3, N13.8  ICD-9-CM: 600.11  11/15/2013 - Present        Hematuria ICD-10-CM: R31.9  ICD-9-CM: 599.70  11/15/2013 - Present        Retention of urine, unspecified ICD-10-CM: R33.9  ICD-9-CM: 788.20  11/15/2013 - Present        Elevated prostate specific antigen (PSA) ICD-10-CM: R97.20  ICD-9-CM: 790.93  11/15/2013 - Present        Impotence of organic origin ICD-10-CM: N52.9  ICD-9-CM: 607.84  11/15/2013 - Present        Chronic prostatitis ICD-10-CM: N41.1  ICD-9-CM: 601.1  11/15/2013 - Present        regrowth of Nodular BPH post prior [employer] laser procedure ICD-10-CM: N40.1, N13.8  ICD-9-CM: 600.01, 599.69  2/15/2013 - Present        Lumbar stenosis with neurogenic claudication ICD-10-CM: F85.303  ICD-9-CM: 724.03  3/1/2012 - Present        GERD (gastroesophageal reflux disease) ICD-10-CM: K21.9  ICD-9-CM: 530.81  Unknown - Present    Overview Signed 3/1/2012  1:06 PM by Candelario Gtz MD     Prilosec daily             Arthritis ICD-10-CM: M19.90  ICD-9-CM: 716.90  Unknown - Present    Overview Signed 3/1/2012  1:06 PM by Candelario Gtz MD     hands             Hypercholesteremia ICD-10-CM: E78.00  ICD-9-CM: 272.0  Unknown - Present        ADD (attention deficit disorder) ICD-10-CM: F98.8  ICD-9-CM: 314.00  Unknown - Present        RESOLVED: Snoring ICD-10-CM: R06.83  ICD-9-CM: 786.09  2/13/2017 - 3/1/2017        RESOLVED: Diabetes mellitus type 2, controlled ([facility] ICD-10-CM: E11.9  ICD-9-CM: 250.00  2/3/2016 - 3/21/2018        RESOLVED: Aneurysm of unspecified site [facility]) ICD-10-CM: I72.9  ICD-9-CM: 442.9  7/22/2015 - 8/23/2017               Discharge Condition: Good    Hospital Course: Pt was admitted for embolization of his known Dural AV Fistula with Dr. Amauri Daniels. The pt had [place] embolization of his Dural AVF that is supplied by his bilat ECA's and left vertebral artery, the pt was then admitted overnight to our ICU for further evaluation. The pt did take over hour to wake from anesthesia but was able to be extubated post procedure and wake up and follow commands without any difficulty once bilat hearing aids were placed. The pt has done well overnight and is ready for dc home today.  The pt did have repeat CT head and CTA head and neck this am that showed a new left posterior subdural hematoma, pt with no neuro changes and SDH is minimal. Dr. Marquis Norwood evaluated pt prior to dc and updated pt and wife on post procedure imaging, pt will dc home on metoprolol 50mg po bid to keep SBP <160 and we will see him back with repeat CT head/CTA head in 4 weeks, sooner if any acute neuro changes. The pt is aox3 on exam, maew and in nad. R groin is CDI with pulses intact +2 and no acute changes. Pt and wife verbalized understanding of dc instructions and need to call ASAP if any neuro changes, nv, headache, weakness or any concerns about R groin site. Pt will call if any concerns or acute changes with new BP med, metoprolol. Pt to follow up with Dr. Marquis Norwood in 4 weeks in clinic. Consults: None    Significant Diagnostic Studies: cerebral angiogram for AV fistula embolization, daily labs. Disposition: home    Discharge Medications:   Current Discharge Medication List      CONTINUE these medications which have NOT CHANGED    Details   DRYSOL 20 % external solution JOSEFA AA AS DIRECTED FOR SWEATING  Refills: 5      Vitamin B Complex No.12-Niacin 50 mg/15 mL liqd Take  by mouth.      magnesium oxide (MAG-OX) 400 mg tablet Take 400 mg by mouth. therapeutic multivitamin (THERAGRAN) tablet Take 1 Tab by mouth. omega-3 fatty acids-fish oil 360-1,200 mg cap 2 per mouth daily      aspirin delayed-release 81 mg tablet Take 81 mg by mouth. cholecalciferol (VITAMIN D3) 1,000 unit cap 2,000 Units. omeprazole (PRILOSEC) 20 mg capsule Take 20 mg by mouth. naproxen sodium (ALEVE) 220 mg tablet Take 440 mg by mouth every six (6) hours as needed. raNITIdine (ZANTAC 75) 75 mg tab Take  by mouth two (2) times a day. potassium chloride SR (KLOR-CON 10) 10 mEq tablet Take 10 mEq by mouth every other day. tamsulosin (FLOMAX) 0.4 mg capsule Take 0.4 mg by mouth nightly. clonazePAM (KLONOPIN) 1 mg tablet Take 0.25 mg by mouth nightly.       triamterene-hydroCHLOROthiazide (DYAZIDE) 37.5-25 mg per capsule TK 1 C PO QD  Refills: 6      tadalafil (CIALIS) 5 mg tablet Take 5 mg by mouth as needed. LINZESS 72 mcg cap 72 mcg daily as needed. Refills: 3      nitroglycerin (NITROSTAT) 0.4 mg SL tablet 0.4 mg by SubLINGual route every five (5) minutes as needed for Chest Pain. Indications: Angina      atorvastatin (LIPITOR) 80 mg tablet Take 80 mg by mouth nightly. Indications: hypercholesterolemia      multivitamin (ONE A DAY) tablet Take 1 Tab by mouth daily. turmeric root extract 500 mg cap 1 per mouth daily      co-enzyme Q-10 (CO Q-10) 100 mg capsule Take 200 mg by mouth daily. NEW MEDICATION: Started on Metoprolol 50mg po twice daily for BP, goal to keep SBP <160 until follow up . Activity: no driving or exercise  for 48 hours, no lifting or bending next 7 days. May walk with your exercise group after 48 hours and you feel up to it. No Gym with lifting or bending for 7 days. No bath x7days, shower only. Diet: Regular Diet  Wound Care: R groin puncture site: call us ASAP if any bleeding, increased pain, numbness in right leg, redness at site with fever or any concerns. DC time: 25 minutes for dc education and med recs. FOLLOW UP WILL BE IN 4 WEEKS with Dr. Rob Pisano at our office, sooner if any acute changes or concerns.          Signed By: Speedy Benton NP     August 10, 2019

## 2019-08-10 NOTE — PROGRESS NOTES
Carmel Guillen NP notified of recent CT results; no new orders received at this time. K+ 3.2 this morning - ok to replace.

## 2019-08-10 NOTE — PROGRESS NOTES
Pt with discharge orders this day to return home. No discharge needs voiced at this time. Milestones met. Care Management Interventions  PCP Verified by CM: Yes  Mode of Transport at Discharge:  Other (see comment)  Transition of Care Consult (CM Consult): Discharge Planning  Current Support Network: Lives with Spouse, Own Home  Confirm Follow Up Transport: Self  Plan discussed with Pt/Family/Caregiver: Yes  Freedom of Choice Offered: Yes  [address] Provided?: (MCR/supplemental)  Discharge Location  Discharge Placement: Home

## 2019-08-10 NOTE — DISCHARGE INSTRUCTIONS
1. Follow up with Dr. Monique North in 4 weeks: our office will call you to set up appointment date and time. 2. Call our office for any concerns or questions. [facility]'s phone: [phone]  3. No bath's for 7 days, shower only. Call if any increased pain, drainage, bleeding, or concerns about right groin puncture site. 4. No driving for 48 hours then as tolerated. 5. If any increased headache, dizziness, vomiting, weakness call our office ASAP or go to Emergency Room here at [address] [place] [address] Dr. Anum John DT- we will want to see you.

## 2019-08-10 NOTE — PROGRESS NOTES
Report received from Sidney Arreguin, [place] and dual neuro assessment performed. NIH 0. Pt sleeping, wakens easily to voice, oriented x4. Follows commands.  equal and strong. Pupils equal, round and reactive. SR on monitor with HR 80s. BP within range. SpO2 95-97% on room air. Afebrile. Abdomen soft, intact with active bowel sounds. Ramos draining clear, yellow urine. Wife at bedside. Pt and spouse deny needs at this time. To continue to monitor.

## 2019-09-06 ENCOUNTER — HOSPITAL ENCOUNTER (OUTPATIENT)
Dept: CT IMAGING | Age: 74
Discharge: HOME | End: 2019-09-06
Attending: NEUROLOGICAL SURGERY
Payer: MEDICARE

## 2019-09-06 VITALS — HEIGHT: 68 IN | BODY MASS INDEX: 21.98 KG/M2 | WEIGHT: 145 LBS

## 2019-09-06 DIAGNOSIS — S06.5XAA SUBDURAL HEMATOMA: ICD-10-CM

## 2019-09-06 DIAGNOSIS — I67.1 CEREBROVASCULAR DURAL AV FISTULA: ICD-10-CM

## 2019-09-06 PROCEDURE — 74011000258 HC RX REV CODE- 258: Performed by: NEUROLOGICAL SURGERY

## 2019-09-06 PROCEDURE — 70450 CT HEAD/BRAIN W/O DYE: CPT

## 2019-09-06 PROCEDURE — 70496 CT ANGIOGRAPHY HEAD: CPT

## 2019-09-06 PROCEDURE — 74011636320 HC RX REV CODE- 636/320: Performed by: NEUROLOGICAL SURGERY

## 2019-09-06 RX ORDER — SODIUM CHLORIDE 0.9 % (FLUSH) 0.9 %
10 SYRINGE (ML) INJECTION
Status: COMPLETED | OUTPATIENT
Start: 2019-09-06 | End: 2019-09-06

## 2019-09-06 RX ADMIN — SODIUM CHLORIDE 100 ML: 900 INJECTION, SOLUTION INTRAVENOUS at 11:10

## 2019-09-06 RX ADMIN — IOPAMIDOL 50 ML: 755 INJECTION, SOLUTION INTRAVENOUS at 11:10

## 2019-09-06 RX ADMIN — Medication 10 ML: at 11:10

## 2019-11-26 ENCOUNTER — HOSPITAL ENCOUNTER (OUTPATIENT)
Dept: OTHER | Age: 74
Discharge: HOME | End: 2019-11-26
Attending: NEUROLOGICAL SURGERY
Payer: MEDICARE

## 2019-11-26 VITALS
DIASTOLIC BLOOD PRESSURE: 69 MMHG | TEMPERATURE: 97.8 F | HEART RATE: 52 BPM | SYSTOLIC BLOOD PRESSURE: 126 MMHG | RESPIRATION RATE: 21 BRPM | OXYGEN SATURATION: 97 %

## 2019-11-26 DIAGNOSIS — I67.1 CEREBROVASCULAR MALFORMATION, DURAL AV FISTULA: Primary | ICD-10-CM

## 2019-11-26 DIAGNOSIS — I77.0 A-V FISTULA (HCC): ICD-10-CM

## 2019-11-26 LAB
ANION GAP SERPL CALC-SCNC: 4 MMOL/L (ref 7–16)
BASOPHILS # BLD: 0.1 K/UL (ref 0–0.2)
BASOPHILS NFR BLD: 1 % (ref 0–2)
BUN SERPL-MCNC: 18 MG/DL (ref 8–23)
CALCIUM SERPL-MCNC: 9.2 MG/DL (ref 8.3–10.4)
CHLORIDE SERPL-SCNC: 107 MMOL/L (ref 98–107)
CO2 SERPL-SCNC: 29 MMOL/L (ref 21–32)
CREAT SERPL-MCNC: 1.12 MG/DL (ref 0.8–1.5)
DIFFERENTIAL METHOD BLD: ABNORMAL
EOSINOPHIL # BLD: 0.2 K/UL (ref 0–0.8)
EOSINOPHIL NFR BLD: 3 % (ref 0.5–7.8)
ERYTHROCYTE [DISTWIDTH] IN BLOOD BY AUTOMATED COUNT: 14.4 % (ref 11.9–14.6)
GLUCOSE SERPL-MCNC: 103 MG/DL (ref 65–100)
HCT VFR BLD AUTO: 40.1 % (ref 41.1–50.3)
HGB BLD-MCNC: 13.1 G/DL (ref 13.6–17.2)
IMM GRANULOCYTES # BLD AUTO: 0 K/UL (ref 0–0.5)
IMM GRANULOCYTES NFR BLD AUTO: 0 % (ref 0–5)
INR PPP: 1
LYMPHOCYTES # BLD: 1.7 K/UL (ref 0.5–4.6)
LYMPHOCYTES NFR BLD: 35 % (ref 13–44)
MCH RBC QN AUTO: 30 PG (ref 26.1–32.9)
MCHC RBC AUTO-ENTMCNC: 32.7 G/DL (ref 31.4–35)
MCV RBC AUTO: 91.8 FL (ref 79.6–97.8)
MONOCYTES # BLD: 0.6 K/UL (ref 0.1–1.3)
MONOCYTES NFR BLD: 12 % (ref 4–12)
NEUTS SEG # BLD: 2.3 K/UL (ref 1.7–8.2)
NEUTS SEG NFR BLD: 48 % (ref 43–78)
NRBC # BLD: 0 K/UL (ref 0–0.2)
PLATELET # BLD AUTO: 153 K/UL (ref 150–450)
PMV BLD AUTO: 11 FL (ref 9.4–12.3)
POTASSIUM SERPL-SCNC: 4.2 MMOL/L (ref 3.5–5.1)
PROTHROMBIN TIME: 13.2 SEC (ref 11.7–14.5)
RBC # BLD AUTO: 4.37 M/UL (ref 4.23–5.6)
SODIUM SERPL-SCNC: 140 MMOL/L (ref 136–145)
WBC # BLD AUTO: 4.8 K/UL (ref 4.3–11.1)

## 2019-11-26 PROCEDURE — 36224 PLACE CATH CAROTD ART: CPT | Performed by: NEUROLOGICAL SURGERY

## 2019-11-26 PROCEDURE — 99153 MOD SED SAME PHYS/QHP EA: CPT

## 2019-11-26 PROCEDURE — C1769 GUIDE WIRE: HCPCS

## 2019-11-26 PROCEDURE — C1894 INTRO/SHEATH, NON-LASER: HCPCS

## 2019-11-26 PROCEDURE — 80048 BASIC METABOLIC PNL TOTAL CA: CPT

## 2019-11-26 PROCEDURE — C1760 CLOSURE DEV, VASC: HCPCS

## 2019-11-26 PROCEDURE — 36226 PLACE CATH VERTEBRAL ART: CPT | Performed by: NEUROLOGICAL SURGERY

## 2019-11-26 PROCEDURE — 74011250636 HC RX REV CODE- 250/636: Performed by: NEUROLOGICAL SURGERY

## 2019-11-26 PROCEDURE — 74011636320 HC RX REV CODE- 636/320: Performed by: NEUROLOGICAL SURGERY

## 2019-11-26 PROCEDURE — 36227 PLACE CATH XTRNL CAROTID: CPT | Performed by: NEUROLOGICAL SURGERY

## 2019-11-26 PROCEDURE — 85025 COMPLETE CBC W/AUTO DIFF WBC: CPT

## 2019-11-26 PROCEDURE — 85610 PROTHROMBIN TIME: CPT

## 2019-11-26 PROCEDURE — 77030012468 HC VLV BLEEDBK CNTRL ABBT -B

## 2019-11-26 PROCEDURE — C1887 CATHETER, GUIDING: HCPCS

## 2019-11-26 RX ORDER — SODIUM CHLORIDE 9 MG/ML
150 INJECTION, SOLUTION INTRAVENOUS CONTINUOUS
Status: ACTIVE | OUTPATIENT
Start: 2019-11-26 | End: 2019-11-26

## 2019-11-26 RX ORDER — MIDAZOLAM HYDROCHLORIDE 1 MG/ML
.25-2 INJECTION, SOLUTION INTRAMUSCULAR; INTRAVENOUS
Status: DISCONTINUED | OUTPATIENT
Start: 2019-11-26 | End: 2019-11-26 | Stop reason: ALTCHOICE

## 2019-11-26 RX ORDER — IODIXANOL 320 MG/ML
5-250 INJECTION, SOLUTION INTRAVASCULAR
Status: COMPLETED | OUTPATIENT
Start: 2019-11-26 | End: 2019-11-26

## 2019-11-26 RX ORDER — ONDANSETRON 2 MG/ML
4 INJECTION INTRAMUSCULAR; INTRAVENOUS
Status: DISCONTINUED | OUTPATIENT
Start: 2019-11-26 | End: 2019-11-30 | Stop reason: HOSPADM

## 2019-11-26 RX ORDER — HEPARIN SODIUM 200 [USP'U]/100ML
1000 INJECTION, SOLUTION INTRAVENOUS
Status: DISCONTINUED | OUTPATIENT
Start: 2019-11-26 | End: 2019-11-26 | Stop reason: ALTCHOICE

## 2019-11-26 RX ORDER — LIDOCAINE HYDROCHLORIDE 20 MG/ML
20-200 INJECTION, SOLUTION INFILTRATION; PERINEURAL
Status: DISCONTINUED | OUTPATIENT
Start: 2019-11-26 | End: 2019-11-26 | Stop reason: ALTCHOICE

## 2019-11-26 RX ORDER — ACETAMINOPHEN 325 MG/1
650 TABLET ORAL
Status: DISCONTINUED | OUTPATIENT
Start: 2019-11-26 | End: 2019-11-30 | Stop reason: HOSPADM

## 2019-11-26 RX ORDER — SODIUM CHLORIDE 9 MG/ML
25 INJECTION, SOLUTION INTRAVENOUS ONCE
Status: COMPLETED | OUTPATIENT
Start: 2019-11-26 | End: 2019-11-26

## 2019-11-26 RX ORDER — FENTANYL CITRATE 50 UG/ML
25-100 INJECTION, SOLUTION INTRAMUSCULAR; INTRAVENOUS
Status: DISCONTINUED | OUTPATIENT
Start: 2019-11-26 | End: 2019-11-26 | Stop reason: ALTCHOICE

## 2019-11-26 RX ADMIN — HEPARIN SODIUM 2000 UNITS: 200 INJECTION, SOLUTION INTRAVENOUS at 11:48

## 2019-11-26 RX ADMIN — HEPARIN SODIUM 2000 UNITS: 200 INJECTION, SOLUTION INTRAVENOUS at 11:47

## 2019-11-26 RX ADMIN — SODIUM CHLORIDE 25 ML/HR: 900 INJECTION, SOLUTION INTRAVENOUS at 11:26

## 2019-11-26 RX ADMIN — IODIXANOL 125 ML: 320 INJECTION, SOLUTION INTRAVASCULAR at 12:14

## 2019-11-26 RX ADMIN — FENTANYL CITRATE 25 MCG: 50 INJECTION, SOLUTION INTRAMUSCULAR; INTRAVENOUS at 12:11

## 2019-11-26 RX ADMIN — FENTANYL CITRATE 25 MCG: 50 INJECTION, SOLUTION INTRAMUSCULAR; INTRAVENOUS at 11:50

## 2019-11-26 RX ADMIN — FENTANYL CITRATE 50 MCG: 50 INJECTION, SOLUTION INTRAMUSCULAR; INTRAVENOUS at 11:30

## 2019-11-26 RX ADMIN — HEPARIN SODIUM 2000 UNITS: 200 INJECTION, SOLUTION INTRAVENOUS at 11:46

## 2019-11-26 RX ADMIN — MIDAZOLAM 1 MG: 1 INJECTION INTRAMUSCULAR; INTRAVENOUS at 11:30

## 2019-11-26 RX ADMIN — HEPARIN SODIUM 2000 UNITS: 200 INJECTION, SOLUTION INTRAVENOUS at 11:45

## 2019-11-26 NOTE — PROGRESS NOTES
Patient up OOB ambulating in unit with noted slow steady gait , denies any c/o 's at this time. Right groin puncture site dressing dry intact.

## 2019-11-26 NOTE — OP NOTES
Procedure: Cerebral angiogram  Surgeon: Dr. Oksana Magdaleno  Pre-op Dx: s/p embolization of AV fistula 8/2019  Post-op Dx: same  Anesthesia: Conscious sedation with fentanyl and versed  Complications: None  Findings: Right ICA dural branches, one branch from the left ECA and left vertebral artery branches continue to feed the fistula.

## 2019-11-26 NOTE — PROGRESS NOTES
TRANSFER - OUT REPORT:    Verbal report given to Jonh Morris RN (name) on [facility].  being transferred to IR recovery (unit) for routine progression of care       Report consisted of patients Situation, Background, Assessment and   Recommendations(SBAR). Information from the following report(s) Procedure Summary and MAR was reviewed with the receiving nurse. Opportunity for questions and clarification was provided. Conscious Sedation:   100 Mcg of Fentanyl administered  1 Mg of Versed administered    Pt tolerated procedure well.      Visit Vitals  /55   Pulse (!) 44   Temp 97.8 °F (36.6 °C)   Resp 14   SpO2 98%     Past Medical History:   Diagnosis Date    ADD (attention deficit disorder)     controlled with Adderall    Anemia, unspecified     Aneurysm of unspecified site [facility])     Patient denies    Anxiety     Arthritis     hands, back    Attention deficit disorder with hyperactivity(314.01)     Benign paroxysmal vertigo     BPH (benign prostatic hypertrophy)     CAD (coronary artery disease)     2 stents placed 09/14/17; denes MI    Carpal tunnel syndrome of right wrist 4/1/2019    Chronic prostatitis 11/15/2013    Colitis, enteritis, and gastroenteritis of presumed infectious origin     Depressive disorder, not elsewhere classified     Patient denies depression but has anxiety    Disorders of bursae and tendons in shoulder region, unspecified     Fatty liver     Former smoker     GERD (gastroesophageal reflux disease)     controlled w/med    History of tetanus, diphtheria, and acellular pertussis booster vaccination (Tdap)     done 2010    Hypercholesteremia     Hypertrophy of prostate with urinary obstruction and other lower urinary tract symptoms (LUTS) 11/15/2013    Impotence of organic origin     Inguinal hernia 2014    bilat    Insomnia, unspecified     Medial epicondylitis of elbow     Nonspecific elevation of levels of transaminase or lactic acid dehydrogenase (LDH)     Orthostatic hypotension     Osteoporosis 11/15/2013    Other and unspecified hyperlipidemia     Pain of right thumb 4/1/2019    Sleep apnea     No CPAP     Tremor     from Adderall, Inderal prn    Type II or unspecified type diabetes mellitus without mention of complication, not stated as uncontrolled     per pt.  \"pre-diabetic\"; no treatment; last A1C 6.2    Vertigo      Peripheral IV 11/26/19 Left Antecubital (Active)

## 2019-11-26 NOTE — H&P
History of Present Illness  Patient presents for follow up s/p embolization of fistula 8/2019. He denies any issues other than fatigue since his surgery. He is on the metoprolol 25mg BID and seems to be tolerating it well. His BP have been good at home.  He continues to have infrequent headaches but these are unchanged.          Past Medical History:   Diagnosis Date    ADD (attention deficit disorder)       controlled with Adderall    Anemia, unspecified      Aneurysm of unspecified site [facility])       Patient denies    Anxiety      Arthritis       hands, back    Attention deficit disorder with hyperactivity(314.01)      Benign paroxysmal vertigo      BPH (benign prostatic hypertrophy)      CAD (coronary artery disease)       2 stents placed 09/14/17; denes MI    Carpal tunnel syndrome of right wrist 4/1/2019    Chronic prostatitis 11/15/2013    Colitis, enteritis, and gastroenteritis of presumed infectious origin      Depressive disorder, not elsewhere classified       Patient denies depression but has anxiety    Disorders of bursae and tendons in shoulder region, unspecified      Fatty liver      Former smoker      GERD (gastroesophageal reflux disease)       controlled w/med    History of tetanus, diphtheria, and acellular pertussis booster vaccination (Tdap)       done 2010    Hypercholesteremia      Hypertrophy of prostate with urinary obstruction and other lower urinary tract symptoms (LUTS) 11/15/2013    Impotence of organic origin      Inguinal hernia 2014     bilat    Insomnia, unspecified      Medial epicondylitis of elbow      Nonspecific elevation of levels of transaminase or lactic acid dehydrogenase (LDH)      Orthostatic hypotension      Osteoporosis 11/15/2013    Other and unspecified hyperlipidemia      Pain of right thumb 4/1/2019    Sleep apnea       No CPAP     Tremor       from Adderall, Inderal prn    Type II or unspecified type diabetes mellitus without mention of complication, not stated as uncontrolled       per pt. \"pre-diabetic\"; no treatment; last A1C 6.2    Vertigo                          Allergies   Allergen Reactions    Other Medication Other (comments)       Allergy to vinegar \"my nose runs\" and allergy to yeast \"my nose gets stuffy;' red wine-runny nose       Red Wine Extract Other (comments)       Allergy to vinegar \"my nose runs\" and allergy to yeast \"my nose gets stuffy;' red wine-runny nose               Family History   Problem Relation Age of Onset    Glaucoma Mother      Hypertension Mother      Stroke Mother      Stroke Brother      Heart Disease Brother      Coronary Artery Disease Brother      Heart Disease Father      Cancer Father           esophageal    Coronary Artery Disease Father           Social History            Socioeconomic History    Marital status:        Spouse name: Not on file    Number of children: Not on file    Years of education: Not on file    Highest education level: Not on file   Occupational History    Not on file   Social Needs    Financial resource strain: Not on file    Food insecurity:       Worry: Not on file       Inability: Not on file    Transportation needs:       Medical: Not on file       Non-medical: Not on file   Tobacco Use    Smoking status: Former Smoker       Packs/day: 1.00       Years: 10.00       Pack years: 10.00       Last attempt to quit: 1977       Years since quittin.5    Smokeless tobacco: Never Used   Substance and Sexual Activity    Alcohol use:  Yes       Alcohol/week: 5.8 standard drinks       Types: 7 Glasses of wine per week       Comment: occass, 3 drinks per week    Drug use: No    Sexual activity: Not Currently   Lifestyle    Physical activity:       Days per week: Not on file       Minutes per session: Not on file    Stress: Not on file   Relationships    Social connections:       Talks on phone: Not on file       Gets together: Not on file       Attends Mandaeism service: Not on file       Active member of club or organization: Not on file       Attends meetings of clubs or organizations: Not on file       Relationship status: Not on file    Intimate partner violence:       Fear of current or ex partner: Not on file       Emotionally abused: Not on file       Physically abused: Not on file       Forced sexual activity: Not on file   Other Topics Concern    Not on file   Social History Narrative    Not on file                Current Outpatient Medications   Medication Sig Dispense Refill    metoprolol tartrate (LOPRESSOR) 50 mg tablet Take 1 Tab by mouth two (2) times a day.  (Patient taking differently: Take 25 mg by mouth two (2) times a day.) 60 Tab 1    DRYSOL 20 % external solution JOSEFA AA AS DIRECTED FOR SWEATING   5    Vitamin B Complex No.12-Niacin 50 mg/15 mL liqd Take  by mouth.        magnesium oxide (MAG-OX) 400 mg tablet Take 500 mg by mouth daily.        therapeutic multivitamin (THERAGRAN) tablet Take 1 Tab by mouth.        omega-3 fatty acids-fish oil 360-1,200 mg cap 1,000 mg daily.        aspirin delayed-release 81 mg tablet Take 81 mg by mouth daily.        cholecalciferol (VITAMIN D3) 1,000 unit cap 2,000 Units daily.        omeprazole (PRILOSEC) 20 mg capsule Take 20 mg by mouth daily.        naproxen sodium (ALEVE) 220 mg tablet Take 440 mg by mouth as needed.        raNITIdine (ZANTAC 75) 75 mg tab Take 75 mg by mouth daily.        potassium chloride SR (KLOR-CON 10) 10 mEq tablet Take 10 mEq by mouth every other day.        tamsulosin (FLOMAX) 0.4 mg capsule Take 0.4 mg by mouth nightly.        clonazePAM (KLONOPIN) 1 mg tablet Take 0.5 mg by mouth nightly.        triamterene-hydroCHLOROthiazide (DYAZIDE) 37.5-25 mg per capsule TK 1 C PO QD   6    tadalafil (CIALIS) 5 mg tablet Take 5 mg by mouth as needed.        LINZESS 72 mcg cap 72 mcg daily as needed.   3    atorvastatin (LIPITOR) 80 mg tablet Take 80 mg by mouth nightly. Indications: hypercholesterolemia        multivitamin (ONE A DAY) tablet Take 1 Tab by mouth daily.        turmeric root extract 500 mg cap 1 per mouth daily        co-enzyme Q-10 (CO Q-10) 100 mg capsule Take 200 mg by mouth daily.        nitroglycerin (NITROSTAT) 0.4 mg SL tablet 0.4 mg by SubLINGual route every five (5) minutes as needed for Chest Pain. Indications: Angina                ROS  Negative except HPI.     Physical Exam     Cranial Nerves:   MARIYA, EOM's full, no nystagmus, no ptosis. Facial sensation is normal. Facial movement is symmetric. Hearing is normal bilaterally. Palate is midline with normal sternocleidomastoid and trapezius muscles are normal. Tongue is midline. Motor:  5/5 strength in upper and lower proximal and distal muscles. Normal bulk and tone. No fasciculations. Reflexes:    Not done   Sensory:   Normal to touch. Gait:  Normal gait. Tremor:   No tremor noted. Cerebellar:  No cerebellar signs present.               FILMS: CTA head showed stable AVF. No significant changes noted. CT head showed resolution of the small SDH seen post op.     Assessment & Plan:  I had a good discussion with the patient about the CTA findings and that the small SDH had resolved. I went over the cerebral angiogram procedure including the benefits, alternatives, and risks. Consent was obtained. All of his questions were answered. He was in agreement with this plan.

## 2019-11-26 NOTE — DISCHARGE INSTRUCTIONS
[address] [facility],4Th Floor  [facility]            Cerebral Angiography Discharge Instructions    General Information: This test is done to evaluate the blood vessels in your brain and neck. Following the procedure, you will be asked to lie flat on your back for 2-6 hours after the procedure to prevent bleeding complications. The physician may use an arterial closure device that will decrease your recovery time. If this is used, your nurse will explain the difference in recovery. We have no way to determine if we can use a closure device until the procedure is started. We will let you know when you wake up after the procedure. Home Care Instructions: You can resume your regular diet. Do not shower, bathe, swim, drink alcohol, or make any important legal decisions in the next 48 hours. You may drive after 24 hours. Do not lift anything heavier than a gallon of milk for 2 days. Watch the site carefully for signs of infection, like fever, drainage, redness, and/or swelling. If you take Glucophage (Metformin) for diabetes, do not take it for the next 48 hours. If you were asked to hold any blood thinners prior to the procedure, you may restart that medicine the day after the procedure is completed or when instructed by your physician. Recline your car seat for the ride home. Call If: You should call your Physician and/or the Radiology Nurse if you have any signs of infection like fever, drainage, redness, and/or swelling. If the puncture site should ooze, please call. Also call if you have any pain, decreased sensation, numbness, tingling, swelling, or change in color to the affected extremity. SEEK IMMEDIATE MEDICAL CARE IF YOUR PUNCTURE SITE STARTS TO BLEED. APPLY ENOUGH FIRM PRESSURE TO THE SITE WITH THE TIPS OF YOUR FINGERS TO STOP THE BLEEDING. Arterial bleeding is a medical emergency and should be evaluated immediately.   Interventional Radiology General Nurse Discharge    After general anesthesia or intravenous sedation, for 24 hours or while taking prescription Narcotics:  · Limit your activities  · Do not drive and operate hazardous machinery  · Do not make important personal or business decisions  · Do  not drink alcoholic beverages  · If you have not urinated within 8 hours after discharge, please contact your surgeon on call. * Please give a list of your current medications to your Primary Care Provider. * Please update this list whenever your medications are discontinued, doses are     changed, or new medications (including over-the-counter products) are added. * Please carry medication information at all times in case of emergency situations. These are general instructions for a healthy lifestyle:    No smoking/ No tobacco products/ Avoid exposure to second hand smoke  Surgeon General's Warning:  Quitting smoking now greatly reduces serious risk to your health. Obesity, smoking, and sedentary lifestyle greatly increases your risk for illness  A healthy diet, regular physical exercise & weight monitoring are important for maintaining a healthy lifestyle    You may be retaining fluid if you have a history of heart failure or if you experience any of the following symptoms:  Weight gain of 3 pounds or more overnight or 5 pounds in a week, increased swelling in our hands or feet or shortness of breath while lying flat in bed. Please call your doctor as soon as you notice any of these symptoms; do not wait until your next office visit. Recognize signs and symptoms of STROKE:  F-face looks uneven    A-arms unable to move or move unevenly    S-speech slurred or non-existent    T-time-call 911 as soon as signs and symptoms begin-DO NOT go       Back to bed or wait to see if you get better-TIME IS BRAIN. Follow-Up Instructions:  Please follow up with your ordering doctor as he/she has requested. To Reach Us:   If you have any questions about your procedure, please call Korin Loja NP at [phone].

## 2019-12-04 ENCOUNTER — HOSPITAL ENCOUNTER (OUTPATIENT)
Dept: LAB | Age: 74
Discharge: HOME | End: 2019-12-04

## 2019-12-04 PROCEDURE — 88312 SPECIAL STAINS GROUP 1: CPT

## 2019-12-04 PROCEDURE — 88305 TISSUE EXAM BY PATHOLOGIST: CPT

## 2020-01-17 DIAGNOSIS — D68.9 BLOOD CLOTTING DISORDER (HCC): ICD-10-CM

## 2020-01-17 DIAGNOSIS — I67.1 CEREBROVASCULAR DURAL AV FISTULA: Primary | ICD-10-CM

## 2020-03-02 ENCOUNTER — ANESTHESIA EVENT (OUTPATIENT)
Dept: SURGERY | Age: 75
DRG: 093 | End: 2020-03-02
Payer: MEDICARE

## 2020-03-02 ENCOUNTER — HOSPITAL ENCOUNTER (OUTPATIENT)
Dept: SURGERY | Age: 75
Discharge: HOME | End: 2020-03-02
Payer: MEDICARE

## 2020-03-02 VITALS
RESPIRATION RATE: 18 BRPM | DIASTOLIC BLOOD PRESSURE: 66 MMHG | SYSTOLIC BLOOD PRESSURE: 119 MMHG | WEIGHT: 155.19 LBS | TEMPERATURE: 98.1 F | BODY MASS INDEX: 23.52 KG/M2 | OXYGEN SATURATION: 98 % | HEIGHT: 68 IN | HEART RATE: 57 BPM

## 2020-03-02 DIAGNOSIS — D68.9 BLOOD CLOTTING DISORDER (HCC): ICD-10-CM

## 2020-03-02 DIAGNOSIS — I67.1 CEREBROVASCULAR DURAL AV FISTULA: Primary | ICD-10-CM

## 2020-03-02 DIAGNOSIS — I67.1 CEREBROVASCULAR DURAL AV FISTULA: ICD-10-CM

## 2020-03-02 LAB
ANION GAP SERPL CALC-SCNC: 5 MMOL/L (ref 7–16)
APTT PPP: 27.3 SEC (ref 24.3–35.4)
ASPIRIN TEST, ASPIRN: 483 ARU (ref 620–672)
BUN SERPL-MCNC: 19 MG/DL (ref 8–23)
CALCIUM SERPL-MCNC: 9.2 MG/DL (ref 8.3–10.4)
CHLORIDE SERPL-SCNC: 107 MMOL/L (ref 98–107)
CO2 SERPL-SCNC: 27 MMOL/L (ref 21–32)
CREAT SERPL-MCNC: 1.15 MG/DL (ref 0.8–1.5)
ERYTHROCYTE [DISTWIDTH] IN BLOOD BY AUTOMATED COUNT: 14.2 % (ref 11.9–14.6)
GLUCOSE SERPL-MCNC: 96 MG/DL (ref 65–100)
HCT VFR BLD AUTO: 40.1 % (ref 41.1–50.3)
HGB BLD-MCNC: 13.1 G/DL (ref 13.6–17.2)
INR PPP: 0.9
MCH RBC QN AUTO: 29.4 PG (ref 26.1–32.9)
MCHC RBC AUTO-ENTMCNC: 32.7 G/DL (ref 31.4–35)
MCV RBC AUTO: 89.9 FL (ref 79.6–97.8)
NRBC # BLD: 0 K/UL (ref 0–0.2)
PLATELET # BLD AUTO: 149 K/UL (ref 150–450)
PMV BLD AUTO: 11.1 FL (ref 9.4–12.3)
POTASSIUM SERPL-SCNC: 4.2 MMOL/L (ref 3.5–5.1)
PROTHROMBIN TIME: 12.5 SEC (ref 12–14.7)
RBC # BLD AUTO: 4.46 M/UL (ref 4.23–5.6)
SODIUM SERPL-SCNC: 139 MMOL/L (ref 136–145)
WBC # BLD AUTO: 6.6 K/UL (ref 4.3–11.1)

## 2020-03-02 PROCEDURE — 36415 COLL VENOUS BLD VENIPUNCTURE: CPT

## 2020-03-02 PROCEDURE — 85027 COMPLETE CBC AUTOMATED: CPT

## 2020-03-02 PROCEDURE — 80048 BASIC METABOLIC PNL TOTAL CA: CPT

## 2020-03-02 PROCEDURE — 85610 PROTHROMBIN TIME: CPT

## 2020-03-02 PROCEDURE — 85730 THROMBOPLASTIN TIME PARTIAL: CPT

## 2020-03-02 PROCEDURE — 85576 BLOOD PLATELET AGGREGATION: CPT

## 2020-03-02 NOTE — PERIOP NOTES
PLEASE CONTINUE TAKING ALL PRESCRIPTION MEDICATIONS UP TO THE DAY OF SURGERY UNLESS OTHERWISE DIRECTED BELOW. DISCONTINUE all vitamins and supplements 7 days prior to surgery. DISCONTINUE Non-Steriodal Anti-Inflammatory (NSAIDS) such as Advil and Aleve 5 days prior to surgery. Home Medications to take  the day of surgery    Asprin 81 mg, Metoprolol, omeprazole,           Home Medications   to Hold   Vitamin D, CoQ10, Magnesium, multivitamin, omega 3, Tumeric, vitamin b Complex        Comments    Acetaminophen 1000mg in the morning and at bedtime   OR Acetaminophen 650mg in the morning, afternoon and bedtime Night before Surgery             Please do not bring home medications with you on the day of surgery unless otherwise directed by your nurse. If you are instructed to bring home medications, please give them to your nurse as they will be administered by the nursing staff. If you have any questions, please call [facility] [phone] or [facility] [phone].

## 2020-03-02 NOTE — PERIOP NOTES
Patient verified name and     Order for consent  found in EHR and matches case posting; patient verified. Type 3 surgery, walk in  assessment complete. Labs per surgeon: ASA test, cbc,bmp,pt,ptt results pending  Labs per anesthesia protocol: cbc,bmp; results pending  [id number]  Dr Jennifer Jeffers notified of pt surgery. He does not need to see pt today. Hospital approved surgical skin cleanser and instructions given per hospital policy. Patient provided with and instructed on educational handouts including Guide to Surgery, Pain Management, Hand Hygiene, Blood Transfusion Education, and [facility] Brochure. Patient answered medical/surgical history questions at their best of ability. All prior to admission medications documented in [facility]. Original medication prescription bottle not visualized during patient appointment. Patient instructed to hold all vitamins 7 days prior to surgery and NSAIDS 5 days prior to surgery, patient verbalized understanding. Patient teach back successful and patient demonstrates knowledge of instructions.

## 2020-03-02 NOTE — PERIOP NOTES
Recent Results (from the past 12 hour(s))   ASPIRIN TEST    Collection Time: 03/02/20 12:26 PM   Result Value Ref Range    Aspirin test 483 (L) 620 - 672 ARU   CBC W/O DIFF    Collection Time: 03/02/20 12:26 PM   Result Value Ref Range    WBC 6.6 4.3 - 11.1 K/uL    RBC 4.46 4.23 - 5.6 M/uL    HGB 13.1 (L) 13.6 - 17.2 g/dL    HCT 40.1 (L) 41.1 - 50.3 %    MCV 89.9 79.6 - 97.8 FL    MCH 29.4 26.1 - 32.9 PG    MCHC 32.7 31.4 - 35.0 g/dL    RDW 14.2 11.9 - 14.6 %    PLATELET 630 (L) 134 - 450 K/uL    MPV 11.1 9.4 - 12.3 FL    ABSOLUTE NRBC 0.00 0.0 - 0.2 K/uL   METABOLIC PANEL, BASIC    Collection Time: 03/02/20 12:26 PM   Result Value Ref Range    Sodium 139 136 - 145 mmol/L    Potassium 4.2 3.5 - 5.1 mmol/L    Chloride 107 98 - 107 mmol/L    CO2 27 21 - 32 mmol/L    Anion gap 5 (L) 7 - 16 mmol/L    Glucose 96 65 - 100 mg/dL    BUN 19 8 - 23 MG/DL    Creatinine 1.15 0.8 - 1.5 MG/DL    GFR est AA >60 >60 ml/min/1.73m2    GFR est non-AA >60 >60 ml/min/1.73m2    Calcium 9.2 8.3 - 10.4 MG/DL   PROTHROMBIN TIME + INR    Collection Time: 03/02/20 12:26 PM   Result Value Ref Range    Prothrombin time 12.5 12.0 - 14.7 sec    INR 0.9     PTT    Collection Time: 03/02/20 12:26 PM   Result Value Ref Range    aPTT 27.3 24.3 - 35.4 SEC   Reviewed

## 2020-03-08 RX ORDER — LIDOCAINE HYDROCHLORIDE 10 MG/ML
0.1 INJECTION INFILTRATION; PERINEURAL AS NEEDED
Status: CANCELLED | OUTPATIENT
Start: 2020-03-08

## 2020-03-08 RX ORDER — FENTANYL CITRATE 50 UG/ML
100 INJECTION, SOLUTION INTRAMUSCULAR; INTRAVENOUS ONCE
Status: CANCELLED | OUTPATIENT
Start: 2020-03-08 | End: 2020-03-08

## 2020-03-08 RX ORDER — SODIUM CHLORIDE 0.9 % (FLUSH) 0.9 %
5-40 SYRINGE (ML) INJECTION EVERY 8 HOURS
Status: CANCELLED | OUTPATIENT
Start: 2020-03-08

## 2020-03-08 RX ORDER — SODIUM CHLORIDE 0.9 % (FLUSH) 0.9 %
5-40 SYRINGE (ML) INJECTION AS NEEDED
Status: CANCELLED | OUTPATIENT
Start: 2020-03-08

## 2020-03-09 ENCOUNTER — HOSPITAL ENCOUNTER (INPATIENT)
Age: 75
LOS: 1 days | Discharge: HOME | DRG: 093 | End: 2020-03-10
Attending: NEUROLOGICAL SURGERY | Admitting: NEUROLOGICAL SURGERY
Payer: MEDICARE

## 2020-03-09 ENCOUNTER — APPOINTMENT (OUTPATIENT)
Dept: OTHER | Age: 75
DRG: 093 | End: 2020-03-09
Attending: NEUROLOGICAL SURGERY
Payer: MEDICARE

## 2020-03-09 ENCOUNTER — ANESTHESIA (OUTPATIENT)
Dept: SURGERY | Age: 75
DRG: 093 | End: 2020-03-09
Payer: MEDICARE

## 2020-03-09 DIAGNOSIS — D68.9 BLOOD CLOTTING DISORDER (HCC): ICD-10-CM

## 2020-03-09 DIAGNOSIS — R93.0 ABNORMAL ANGIOGRAM OF HEAD: ICD-10-CM

## 2020-03-09 DIAGNOSIS — I67.1 CEREBROVASCULAR MALFORMATION, DURAL AV FISTULA: Primary | ICD-10-CM

## 2020-03-09 DIAGNOSIS — I67.1 CEREBROVASCULAR DURAL AV FISTULA: ICD-10-CM

## 2020-03-09 LAB
ABO + RH BLD: NORMAL
ANION GAP SERPL CALC-SCNC: 4 MMOL/L (ref 7–16)
APTT PPP: 30.1 SEC (ref 24.3–35.4)
ASPIRIN TEST, ASPIRN: 446 ARU (ref 620–672)
BLOOD GROUP ANTIBODIES SERPL: NORMAL
BUN SERPL-MCNC: 16 MG/DL (ref 8–23)
CALCIUM SERPL-MCNC: 8.7 MG/DL (ref 8.3–10.4)
CHLORIDE SERPL-SCNC: 106 MMOL/L (ref 98–107)
CO2 SERPL-SCNC: 30 MMOL/L (ref 21–32)
CREAT SERPL-MCNC: 1.17 MG/DL (ref 0.8–1.5)
ERYTHROCYTE [DISTWIDTH] IN BLOOD BY AUTOMATED COUNT: 14.3 % (ref 11.9–14.6)
GLUCOSE SERPL-MCNC: 103 MG/DL (ref 65–100)
HCT VFR BLD AUTO: 37.9 % (ref 41.1–50.3)
HGB BLD-MCNC: 12.2 G/DL (ref 13.6–17.2)
INR PPP: 1
MCH RBC QN AUTO: 28.9 PG (ref 26.1–32.9)
MCHC RBC AUTO-ENTMCNC: 32.2 G/DL (ref 31.4–35)
MCV RBC AUTO: 89.8 FL (ref 79.6–97.8)
NRBC # BLD: 0 K/UL (ref 0–0.2)
PLATELET # BLD AUTO: 146 K/UL (ref 150–450)
PMV BLD AUTO: 11.2 FL (ref 9.4–12.3)
POTASSIUM SERPL-SCNC: 3.4 MMOL/L (ref 3.5–5.1)
PROTHROMBIN TIME: 13 SEC (ref 12–14.7)
RBC # BLD AUTO: 4.22 M/UL (ref 4.23–5.6)
SODIUM SERPL-SCNC: 140 MMOL/L (ref 136–145)
SPECIMEN EXP DATE BLD: NORMAL
WBC # BLD AUTO: 5.1 K/UL (ref 4.3–11.1)

## 2020-03-09 PROCEDURE — 65610000001 HC ROOM ICU GENERAL

## 2020-03-09 PROCEDURE — B31F1ZZ FLUOROSCOPY OF LEFT VERTEBRAL ARTERY USING LOW OSMOLAR CONTRAST: ICD-10-PCS | Performed by: NEUROLOGICAL SURGERY

## 2020-03-09 PROCEDURE — 74011250636 HC RX REV CODE- 250/636: Performed by: NURSE ANESTHETIST, CERTIFIED REGISTERED

## 2020-03-09 PROCEDURE — 74011250636 HC RX REV CODE- 250/636: Performed by: ANESTHESIOLOGY

## 2020-03-09 PROCEDURE — C1887 CATHETER, GUIDING: HCPCS

## 2020-03-09 PROCEDURE — 80048 BASIC METABOLIC PNL TOTAL CA: CPT

## 2020-03-09 PROCEDURE — 36226 PLACE CATH VERTEBRAL ART: CPT

## 2020-03-09 PROCEDURE — 36223 PLACE CATH CAROTID/INOM ART: CPT | Performed by: NEUROLOGICAL SURGERY

## 2020-03-09 PROCEDURE — 77030005518 HC CATH URETH FOL 2W BARD -B

## 2020-03-09 PROCEDURE — 76210000063 HC OR PH I REC FIRST 0.5 HR: Performed by: NEUROLOGICAL SURGERY

## 2020-03-09 PROCEDURE — 77030034850

## 2020-03-09 PROCEDURE — 77030037088 HC TUBE ENDOTRACH ORAL NSL COVD-A: Performed by: ANESTHESIOLOGY

## 2020-03-09 PROCEDURE — 36226 PLACE CATH VERTEBRAL ART: CPT | Performed by: NEUROLOGICAL SURGERY

## 2020-03-09 PROCEDURE — 74011250636 HC RX REV CODE- 250/636: Performed by: NEUROLOGICAL SURGERY

## 2020-03-09 PROCEDURE — 74011250637 HC RX REV CODE- 250/637: Performed by: NURSE PRACTITIONER

## 2020-03-09 PROCEDURE — 77030004566 HC CATH ANGI DX TORCON COOK -B

## 2020-03-09 PROCEDURE — L1830 KO IMMOB CANVAS LONG PRE OTS: HCPCS

## 2020-03-09 PROCEDURE — 77030040922 HC BLNKT HYPOTHRM STRY -A: Performed by: ANESTHESIOLOGY

## 2020-03-09 PROCEDURE — 77030013794 HC KT TRNSDUC BLD EDWD -B: Performed by: ANESTHESIOLOGY

## 2020-03-09 PROCEDURE — 77030040361 HC SLV COMPR DVT MDII -B

## 2020-03-09 PROCEDURE — 86900 BLOOD TYPING SEROLOGIC ABO: CPT

## 2020-03-09 PROCEDURE — C1769 GUIDE WIRE: HCPCS

## 2020-03-09 PROCEDURE — B31R1ZZ FLUOROSCOPY OF INTRACRANIAL ARTERIES USING LOW OSMOLAR CONTRAST: ICD-10-PCS | Performed by: NEUROLOGICAL SURGERY

## 2020-03-09 PROCEDURE — 74011636320 HC RX REV CODE- 636/320: Performed by: NEUROLOGICAL SURGERY

## 2020-03-09 PROCEDURE — 77030039425 HC BLD LARYNG TRULITE DISP TELE -A: Performed by: ANESTHESIOLOGY

## 2020-03-09 PROCEDURE — 77030019905 HC CATH URETH INTMIT MDII -A

## 2020-03-09 PROCEDURE — 77030013292 HC BOWL MX PRSM J&J -A: Performed by: ANESTHESIOLOGY

## 2020-03-09 PROCEDURE — 85027 COMPLETE CBC AUTOMATED: CPT

## 2020-03-09 PROCEDURE — 74011000250 HC RX REV CODE- 250: Performed by: NEUROLOGICAL SURGERY

## 2020-03-09 PROCEDURE — C1894 INTRO/SHEATH, NON-LASER: HCPCS

## 2020-03-09 PROCEDURE — C1760 CLOSURE DEV, VASC: HCPCS

## 2020-03-09 PROCEDURE — 76376 3D RENDER W/INTRP POSTPROCES: CPT | Performed by: NEUROLOGICAL SURGERY

## 2020-03-09 PROCEDURE — 85610 PROTHROMBIN TIME: CPT

## 2020-03-09 PROCEDURE — 36227 PLACE CATH XTRNL CAROTID: CPT | Performed by: NEUROLOGICAL SURGERY

## 2020-03-09 PROCEDURE — 36223 PLACE CATH CAROTID/INOM ART: CPT

## 2020-03-09 PROCEDURE — 77030012468 HC VLV BLEEDBK CNTRL ABBT -B

## 2020-03-09 PROCEDURE — 77030005401 HC CATH RAD ARRO -A: Performed by: ANESTHESIOLOGY

## 2020-03-09 PROCEDURE — 77030020176

## 2020-03-09 PROCEDURE — 77030030163 HC BN WAX J&J -A

## 2020-03-09 PROCEDURE — 77030010507 HC ADH SKN DERMBND J&J -B

## 2020-03-09 PROCEDURE — 85576 BLOOD PLATELET AGGREGATION: CPT

## 2020-03-09 PROCEDURE — 85730 THROMBOPLASTIN TIME PARTIAL: CPT

## 2020-03-09 PROCEDURE — 03JY3ZZ INSPECTION OF UPPER ARTERY, PERCUTANEOUS APPROACH: ICD-10-PCS | Performed by: NEUROLOGICAL SURGERY

## 2020-03-09 PROCEDURE — 74011000250 HC RX REV CODE- 250: Performed by: NURSE ANESTHETIST, CERTIFIED REGISTERED

## 2020-03-09 PROCEDURE — 76060000039 HC ANESTHESIA 4 TO 4.5 HR: Performed by: NEUROLOGICAL SURGERY

## 2020-03-09 PROCEDURE — 77030040393 HC DRSG OPTIFOAM GENT MDII -B

## 2020-03-09 PROCEDURE — B3141ZZ FLUOROSCOPY OF LEFT COMMON CAROTID ARTERY USING LOW OSMOLAR CONTRAST: ICD-10-PCS | Performed by: NEUROLOGICAL SURGERY

## 2020-03-09 RX ORDER — LIDOCAINE HYDROCHLORIDE ANHYDROUS AND DEXTROSE MONOHYDRATE .4; 5 G/100ML; G/100ML
INJECTION, SOLUTION INTRAVENOUS
Status: DISCONTINUED | OUTPATIENT
Start: 2020-03-09 | End: 2020-03-09 | Stop reason: HOSPADM

## 2020-03-09 RX ORDER — REMIFENTANIL HYDROCHLORIDE 1 MG/ML
INJECTION, POWDER, LYOPHILIZED, FOR SOLUTION INTRAVENOUS AS NEEDED
Status: DISCONTINUED | OUTPATIENT
Start: 2020-03-09 | End: 2020-03-09 | Stop reason: HOSPADM

## 2020-03-09 RX ORDER — ROCURONIUM BROMIDE 10 MG/ML
INJECTION, SOLUTION INTRAVENOUS AS NEEDED
Status: DISCONTINUED | OUTPATIENT
Start: 2020-03-09 | End: 2020-03-09 | Stop reason: HOSPADM

## 2020-03-09 RX ORDER — PROPOFOL 10 MG/ML
INJECTION, EMULSION INTRAVENOUS AS NEEDED
Status: DISCONTINUED | OUTPATIENT
Start: 2020-03-09 | End: 2020-03-09 | Stop reason: HOSPADM

## 2020-03-09 RX ORDER — HYDROCODONE BITARTRATE AND ACETAMINOPHEN 5; 325 MG/1; MG/1
1 TABLET ORAL
Status: DISCONTINUED | OUTPATIENT
Start: 2020-03-09 | End: 2020-03-10 | Stop reason: HOSPADM

## 2020-03-09 RX ORDER — OXYCODONE HYDROCHLORIDE 5 MG/1
5 TABLET ORAL
Status: DISCONTINUED | OUTPATIENT
Start: 2020-03-09 | End: 2020-03-09 | Stop reason: HOSPADM

## 2020-03-09 RX ORDER — SODIUM CHLORIDE, SODIUM LACTATE, POTASSIUM CHLORIDE, CALCIUM CHLORIDE 600; 310; 30; 20 MG/100ML; MG/100ML; MG/100ML; MG/100ML
100 INJECTION, SOLUTION INTRAVENOUS CONTINUOUS
Status: DISCONTINUED | OUTPATIENT
Start: 2020-03-09 | End: 2020-03-09 | Stop reason: HOSPADM

## 2020-03-09 RX ORDER — DIPHENHYDRAMINE HYDROCHLORIDE 50 MG/ML
12.5 INJECTION, SOLUTION INTRAMUSCULAR; INTRAVENOUS
Status: DISCONTINUED | OUTPATIENT
Start: 2020-03-09 | End: 2020-03-09 | Stop reason: HOSPADM

## 2020-03-09 RX ORDER — REMIFENTANIL HYDROCHLORIDE 1 MG/ML
INJECTION, POWDER, LYOPHILIZED, FOR SOLUTION INTRAVENOUS
Status: DISCONTINUED | OUTPATIENT
Start: 2020-03-09 | End: 2020-03-09 | Stop reason: HOSPADM

## 2020-03-09 RX ORDER — HYDROMORPHONE HYDROCHLORIDE 2 MG/ML
0.5 INJECTION, SOLUTION INTRAMUSCULAR; INTRAVENOUS; SUBCUTANEOUS
Status: DISCONTINUED | OUTPATIENT
Start: 2020-03-09 | End: 2020-03-09 | Stop reason: HOSPADM

## 2020-03-09 RX ORDER — TAMSULOSIN HYDROCHLORIDE 0.4 MG/1
0.4 CAPSULE ORAL
Status: DISCONTINUED | OUTPATIENT
Start: 2020-03-09 | End: 2020-03-10 | Stop reason: HOSPADM

## 2020-03-09 RX ORDER — ACETAMINOPHEN 325 MG/1
650 TABLET ORAL
Status: DISCONTINUED | OUTPATIENT
Start: 2020-03-09 | End: 2020-03-10 | Stop reason: HOSPADM

## 2020-03-09 RX ORDER — CLONAZEPAM 0.5 MG/1
0.5 TABLET ORAL
Status: DISCONTINUED | OUTPATIENT
Start: 2020-03-09 | End: 2020-03-10 | Stop reason: HOSPADM

## 2020-03-09 RX ORDER — PANTOPRAZOLE SODIUM 40 MG/1
40 TABLET, DELAYED RELEASE ORAL 2 TIMES DAILY
Status: DISCONTINUED | OUTPATIENT
Start: 2020-03-09 | End: 2020-03-10 | Stop reason: HOSPADM

## 2020-03-09 RX ORDER — ONDANSETRON 2 MG/ML
INJECTION INTRAMUSCULAR; INTRAVENOUS AS NEEDED
Status: DISCONTINUED | OUTPATIENT
Start: 2020-03-09 | End: 2020-03-09 | Stop reason: HOSPADM

## 2020-03-09 RX ORDER — SODIUM CHLORIDE 0.9 % (FLUSH) 0.9 %
5-40 SYRINGE (ML) INJECTION EVERY 8 HOURS
Status: DISCONTINUED | OUTPATIENT
Start: 2020-03-09 | End: 2020-03-09 | Stop reason: HOSPADM

## 2020-03-09 RX ORDER — ONDANSETRON 2 MG/ML
4 INJECTION INTRAMUSCULAR; INTRAVENOUS
Status: DISCONTINUED | OUTPATIENT
Start: 2020-03-09 | End: 2020-03-10 | Stop reason: HOSPADM

## 2020-03-09 RX ORDER — SODIUM CHLORIDE 0.9 % (FLUSH) 0.9 %
5-40 SYRINGE (ML) INJECTION AS NEEDED
Status: DISCONTINUED | OUTPATIENT
Start: 2020-03-09 | End: 2020-03-09 | Stop reason: HOSPADM

## 2020-03-09 RX ORDER — PROTAMINE SULFATE 10 MG/ML
20-30 INJECTION, SOLUTION INTRAVENOUS
Status: DISCONTINUED | OUTPATIENT
Start: 2020-03-09 | End: 2020-03-09 | Stop reason: ALTCHOICE

## 2020-03-09 RX ORDER — SUCCINYLCHOLINE CHLORIDE 20 MG/ML
INJECTION INTRAMUSCULAR; INTRAVENOUS AS NEEDED
Status: DISCONTINUED | OUTPATIENT
Start: 2020-03-09 | End: 2020-03-09 | Stop reason: HOSPADM

## 2020-03-09 RX ORDER — OXYCODONE HYDROCHLORIDE 5 MG/1
10 TABLET ORAL
Status: DISCONTINUED | OUTPATIENT
Start: 2020-03-09 | End: 2020-03-09 | Stop reason: HOSPADM

## 2020-03-09 RX ORDER — LIDOCAINE HYDROCHLORIDE 20 MG/ML
INJECTION, SOLUTION EPIDURAL; INFILTRATION; INTRACAUDAL; PERINEURAL AS NEEDED
Status: DISCONTINUED | OUTPATIENT
Start: 2020-03-09 | End: 2020-03-09 | Stop reason: HOSPADM

## 2020-03-09 RX ORDER — ASPIRIN 81 MG/1
81 TABLET ORAL DAILY
Status: DISCONTINUED | OUTPATIENT
Start: 2020-03-10 | End: 2020-03-10 | Stop reason: HOSPADM

## 2020-03-09 RX ORDER — DEXAMETHASONE SODIUM PHOSPHATE 4 MG/ML
INJECTION, SOLUTION INTRA-ARTICULAR; INTRALESIONAL; INTRAMUSCULAR; INTRAVENOUS; SOFT TISSUE AS NEEDED
Status: DISCONTINUED | OUTPATIENT
Start: 2020-03-09 | End: 2020-03-09 | Stop reason: HOSPADM

## 2020-03-09 RX ORDER — SODIUM CHLORIDE 0.9 % (FLUSH) 0.9 %
5-40 SYRINGE (ML) INJECTION AS NEEDED
Status: DISCONTINUED | OUTPATIENT
Start: 2020-03-09 | End: 2020-03-10 | Stop reason: HOSPADM

## 2020-03-09 RX ORDER — NALOXONE HYDROCHLORIDE 0.4 MG/ML
0.2 INJECTION, SOLUTION INTRAMUSCULAR; INTRAVENOUS; SUBCUTANEOUS AS NEEDED
Status: DISCONTINUED | OUTPATIENT
Start: 2020-03-09 | End: 2020-03-09 | Stop reason: HOSPADM

## 2020-03-09 RX ORDER — ATORVASTATIN CALCIUM 40 MG/1
80 TABLET, FILM COATED ORAL
Status: DISCONTINUED | OUTPATIENT
Start: 2020-03-09 | End: 2020-03-10 | Stop reason: HOSPADM

## 2020-03-09 RX ORDER — PROPOFOL 10 MG/ML
INJECTION, EMULSION INTRAVENOUS
Status: DISCONTINUED | OUTPATIENT
Start: 2020-03-09 | End: 2020-03-09 | Stop reason: HOSPADM

## 2020-03-09 RX ORDER — FLUMAZENIL 0.1 MG/ML
0.2 INJECTION INTRAVENOUS
Status: DISCONTINUED | OUTPATIENT
Start: 2020-03-09 | End: 2020-03-09 | Stop reason: HOSPADM

## 2020-03-09 RX ORDER — EPHEDRINE SULFATE/0.9% NACL/PF 50 MG/5 ML
SYRINGE (ML) INTRAVENOUS AS NEEDED
Status: DISCONTINUED | OUTPATIENT
Start: 2020-03-09 | End: 2020-03-09 | Stop reason: HOSPADM

## 2020-03-09 RX ORDER — HEPARIN SODIUM 200 [USP'U]/100ML
1000 INJECTION, SOLUTION INTRAVENOUS
Status: DISCONTINUED | OUTPATIENT
Start: 2020-03-09 | End: 2020-03-09 | Stop reason: ALTCHOICE

## 2020-03-09 RX ORDER — ACETAMINOPHEN 500 MG
1000 TABLET ORAL ONCE
Status: DISCONTINUED | OUTPATIENT
Start: 2020-03-09 | End: 2020-03-09 | Stop reason: HOSPADM

## 2020-03-09 RX ORDER — GLYCOPYRROLATE 0.2 MG/ML
INJECTION INTRAMUSCULAR; INTRAVENOUS AS NEEDED
Status: DISCONTINUED | OUTPATIENT
Start: 2020-03-09 | End: 2020-03-09 | Stop reason: HOSPADM

## 2020-03-09 RX ORDER — METOPROLOL TARTRATE 25 MG/1
25 TABLET, FILM COATED ORAL 2 TIMES DAILY
Status: DISCONTINUED | OUTPATIENT
Start: 2020-03-09 | End: 2020-03-10 | Stop reason: HOSPADM

## 2020-03-09 RX ORDER — SODIUM CHLORIDE, SODIUM LACTATE, POTASSIUM CHLORIDE, CALCIUM CHLORIDE 600; 310; 30; 20 MG/100ML; MG/100ML; MG/100ML; MG/100ML
INJECTION, SOLUTION INTRAVENOUS
Status: DISCONTINUED | OUTPATIENT
Start: 2020-03-09 | End: 2020-03-09 | Stop reason: HOSPADM

## 2020-03-09 RX ORDER — VERAPAMIL HYDROCHLORIDE 2.5 MG/ML
2.5-1 INJECTION, SOLUTION INTRAVENOUS
Status: DISCONTINUED | OUTPATIENT
Start: 2020-03-09 | End: 2020-03-09 | Stop reason: ALTCHOICE

## 2020-03-09 RX ADMIN — REMIFENTANIL HYDROCHLORIDE 70.4 MCG: 1 INJECTION, POWDER, LYOPHILIZED, FOR SOLUTION INTRAVENOUS at 08:09

## 2020-03-09 RX ADMIN — SODIUM CHLORIDE, SODIUM LACTATE, POTASSIUM CHLORIDE, AND CALCIUM CHLORIDE: 600; 310; 30; 20 INJECTION, SOLUTION INTRAVENOUS at 11:30

## 2020-03-09 RX ADMIN — GLYCOPYRROLATE 0.2 MG: 0.2 INJECTION, SOLUTION INTRAMUSCULAR; INTRAVENOUS at 08:27

## 2020-03-09 RX ADMIN — PHENYLEPHRINE HYDROCHLORIDE 120 MCG: 10 INJECTION INTRAVENOUS at 09:51

## 2020-03-09 RX ADMIN — VERAPAMIL HYDROCHLORIDE 10 MG: 2.5 INJECTION, SOLUTION INTRAVENOUS at 09:48

## 2020-03-09 RX ADMIN — HEPARIN SODIUM 2000 UNITS: 200 INJECTION, SOLUTION INTRAVENOUS at 09:17

## 2020-03-09 RX ADMIN — PANTOPRAZOLE SODIUM 40 MG: 40 TABLET, DELAYED RELEASE ORAL at 19:30

## 2020-03-09 RX ADMIN — TAMSULOSIN HYDROCHLORIDE 0.4 MG: 0.4 CAPSULE ORAL at 21:35

## 2020-03-09 RX ADMIN — REMIFENTANIL HYDROCHLORIDE 0.6 MCG/KG/MIN: 1 INJECTION, POWDER, LYOPHILIZED, FOR SOLUTION INTRAVENOUS at 08:20

## 2020-03-09 RX ADMIN — CLONAZEPAM 0.5 MG: 0.5 TABLET ORAL at 21:34

## 2020-03-09 RX ADMIN — HEPARIN SODIUM 2000 UNITS: 200 INJECTION, SOLUTION INTRAVENOUS at 10:40

## 2020-03-09 RX ADMIN — PHENYLEPHRINE HYDROCHLORIDE 240 MCG: 10 INJECTION INTRAVENOUS at 09:49

## 2020-03-09 RX ADMIN — HEPARIN SODIUM 2000 UNITS: 200 INJECTION, SOLUTION INTRAVENOUS at 10:41

## 2020-03-09 RX ADMIN — ATORVASTATIN CALCIUM 80 MG: 40 TABLET, FILM COATED ORAL at 21:34

## 2020-03-09 RX ADMIN — ROCURONIUM BROMIDE 5 MG: 10 INJECTION, SOLUTION INTRAVENOUS at 08:10

## 2020-03-09 RX ADMIN — METOPROLOL TARTRATE 25 MG: 25 TABLET, FILM COATED ORAL at 21:35

## 2020-03-09 RX ADMIN — ONDANSETRON 4 MG: 2 INJECTION INTRAMUSCULAR; INTRAVENOUS at 11:14

## 2020-03-09 RX ADMIN — HEPARIN SODIUM 2000 UNITS: 200 INJECTION, SOLUTION INTRAVENOUS at 09:12

## 2020-03-09 RX ADMIN — Medication 15 MG: at 08:27

## 2020-03-09 RX ADMIN — SODIUM CHLORIDE, SODIUM LACTATE, POTASSIUM CHLORIDE, AND CALCIUM CHLORIDE: 600; 310; 30; 20 INJECTION, SOLUTION INTRAVENOUS at 08:13

## 2020-03-09 RX ADMIN — HEPARIN SODIUM 2000 UNITS: 200 INJECTION, SOLUTION INTRAVENOUS at 09:15

## 2020-03-09 RX ADMIN — REMIFENTANIL HYDROCHLORIDE: 1 INJECTION, POWDER, LYOPHILIZED, FOR SOLUTION INTRAVENOUS at 09:33

## 2020-03-09 RX ADMIN — Medication 10 MG: at 08:26

## 2020-03-09 RX ADMIN — LIDOCAINE HYDROCHLORIDE 60 MG: 20 INJECTION, SOLUTION EPIDURAL; INFILTRATION; INTRACAUDAL; PERINEURAL at 08:09

## 2020-03-09 RX ADMIN — SODIUM CHLORIDE, SODIUM LACTATE, POTASSIUM CHLORIDE, AND CALCIUM CHLORIDE: 600; 310; 30; 20 INJECTION, SOLUTION INTRAVENOUS at 08:03

## 2020-03-09 RX ADMIN — SUCCINYLCHOLINE CHLORIDE 120 MG: 20 INJECTION, SOLUTION INTRAMUSCULAR; INTRAVENOUS at 08:10

## 2020-03-09 RX ADMIN — HEPARIN SODIUM 2000 UNITS: 200 INJECTION, SOLUTION INTRAVENOUS at 09:14

## 2020-03-09 RX ADMIN — LIDOCAINE HYDROCHLORIDE 1 MG/KG/HR: 4 INJECTION, SOLUTION INTRAVENOUS at 08:20

## 2020-03-09 RX ADMIN — PROPOFOL: 10 INJECTION, EMULSION INTRAVENOUS at 10:32

## 2020-03-09 RX ADMIN — PHENYLEPHRINE HYDROCHLORIDE 120 MCG: 10 INJECTION INTRAVENOUS at 09:50

## 2020-03-09 RX ADMIN — SODIUM CHLORIDE, SODIUM LACTATE, POTASSIUM CHLORIDE, AND CALCIUM CHLORIDE 100 ML/HR: 600; 310; 30; 20 INJECTION, SOLUTION INTRAVENOUS at 07:00

## 2020-03-09 RX ADMIN — PROPOFOL: 10 INJECTION, EMULSION INTRAVENOUS at 09:19

## 2020-03-09 RX ADMIN — IOPAMIDOL 175 ML: 612 INJECTION, SOLUTION INTRAVENOUS at 11:18

## 2020-03-09 RX ADMIN — PHENYLEPHRINE HYDROCHLORIDE 60 MCG/MIN: 10 INJECTION INTRAVENOUS at 08:20

## 2020-03-09 RX ADMIN — HEPARIN SODIUM 2000 UNITS: 200 INJECTION, SOLUTION INTRAVENOUS at 09:16

## 2020-03-09 RX ADMIN — DEXAMETHASONE SODIUM PHOSPHATE 4 MG: 4 INJECTION, SOLUTION INTRAMUSCULAR; INTRAVENOUS at 08:38

## 2020-03-09 RX ADMIN — PHENYLEPHRINE HYDROCHLORIDE 240 MCG: 10 INJECTION INTRAVENOUS at 11:33

## 2020-03-09 RX ADMIN — PROPOFOL 150 MG: 10 INJECTION, EMULSION INTRAVENOUS at 08:09

## 2020-03-09 RX ADMIN — PROPOFOL 200 MCG/KG/MIN: 10 INJECTION, EMULSION INTRAVENOUS at 08:20

## 2020-03-09 RX ADMIN — HEPARIN SODIUM 2000 UNITS: 200 INJECTION, SOLUTION INTRAVENOUS at 09:13

## 2020-03-09 NOTE — PROGRESS NOTES
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 Humberto Andrea and admission to ICU. No needs identified at present for d/c. CM will follow for any needs per MD.     Care Management Interventions  PCP Verified by CM: Yes  Mode of Transport at Discharge:  Other (see comment)  Transition of Care Consult (CM Consult): Discharge Planning  Current Support Network: Lives with Spouse  Confirm Follow Up Transport: Family   Resource Information Provided?: [facility])  Discharge Location  Discharge Placement: Home

## 2020-03-09 NOTE — H&P
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 He has been doing well and has had no issues since his angiogram 11/26/2019. He presents for further embolization of his fistula.          Past Medical History:   Diagnosis Date    ADD (attention deficit disorder)       controlled with Adderall    Anemia, unspecified      Aneurysm of unspecified site [facility])       Patient denies    Anxiety      Arthritis       hands, back    Attention deficit disorder with hyperactivity(314.01)      Benign paroxysmal vertigo      BPH (benign prostatic hypertrophy)      CAD (coronary artery disease)       2 stents placed 09/14/17; denes MI    Carpal tunnel syndrome of right wrist 4/1/2019    Chronic prostatitis 11/15/2013    Colitis, enteritis, and gastroenteritis of presumed infectious origin      Depressive disorder, not elsewhere classified       Patient denies depression but has anxiety    Disorders of bursae and tendons in shoulder region, unspecified      Fatty liver      Former smoker      GERD (gastroesophageal reflux disease)       controlled w/med    History of tetanus, diphtheria, and acellular pertussis booster vaccination (Tdap)       done 2010    Hypercholesteremia      Hypertrophy of prostate with urinary obstruction and other lower urinary tract symptoms (LUTS) 11/15/2013    Impotence of organic origin      Inguinal hernia 2014     bilat    Insomnia, unspecified      Medial epicondylitis of elbow      Nonspecific elevation of levels of transaminase or lactic acid dehydrogenase (LDH)      Orthostatic hypotension      Osteoporosis 11/15/2013    Other and unspecified hyperlipidemia      Pain of right thumb 4/1/2019    Sleep apnea       No CPAP     Tremor       from Adderall, Inderal prn    Type II or unspecified type diabetes mellitus without mention of complication, not stated as uncontrolled       per pt.  \"pre-diabetic\"; no treatment; last A1C 6.2    Vertigo                          Allergies   Allergen Reactions    Other Medication Other (comments)       Allergy to vinegar \"my nose runs\" and allergy to yeast \"my nose gets stuffy;' red wine-runny nose       Red Wine Extract Other (comments)       Allergy to vinegar \"my nose runs\" and allergy to yeast \"my nose gets stuffy;' red wine-runny nose               Family History   Problem Relation Age of Onset    Glaucoma Mother      Hypertension Mother      Stroke Mother      Stroke Brother      Heart Disease Brother      Coronary Artery Disease Brother      Heart Disease Father      Cancer Father           esophageal    Coronary Artery Disease Father           Social History            Socioeconomic History    Marital status:        Spouse name: Not on file    Number of children: Not on file    Years of education: Not on file    Highest education level: Not on file   Occupational History    Not on file   Social Needs    Financial resource strain: Not on file    Food insecurity:       Worry: Not on file       Inability: Not on file    Transportation needs:       Medical: Not on file       Non-medical: Not on file   Tobacco Use    Smoking status: Former Smoker       Packs/day: 1.00       Years: 10.00       Pack years: 10.00       Last attempt to quit: 1977       Years since quittin.11    Smokeless tobacco: Never Used   Substance and Sexual Activity    Alcohol use:  Yes       Alcohol/week: 5.8 standard drinks       Types: 7 Glasses of wine per week       Comment: occass, 3 drinks per week    Drug use: No    Sexual activity: Not Currently   Lifestyle    Physical activity:       Days per week: Not on file       Minutes per session: Not on file    Stress: Not on file   Relationships    Social connections:       Talks on phone: Not on file       Gets together: Not on file       Attends Mu-ism service: Not on file       Active member of club or organization: Not on file       Attends meetings of clubs or organizations: Not on file       Relationship status: Not on file    Intimate partner violence:       Fear of current or ex partner: Not on file       Emotionally abused: Not on file       Physically abused: Not on file       Forced sexual activity: Not on file   Other Topics Concern    Not on file   Social History Narrative    Not on file                Current Outpatient Medications   Medication Sig Dispense Refill    multivitamin (ONE A DAY) tablet Take 1 Tab by mouth daily.        metoprolol tartrate (LOPRESSOR) 50 mg tablet Take 1 Tab by mouth two (2) times a day. (Patient taking differently: Take 25 mg by mouth two (2) times a day.) 60 Tab 1    DRYSOL 20 % external solution JOSEFA AA AS DIRECTED FOR SWEATING   5    Vitamin B Complex No.12-Niacin 50 mg/15 mL liqd Take  by mouth.        magnesium oxide (MAG-OX) 400 mg tablet Take 500 mg by mouth daily.        omega-3 fatty acids-fish oil 360-1,200 mg cap 1,000 mg daily.        aspirin delayed-release 81 mg tablet Take 81 mg by mouth daily.        cholecalciferol (VITAMIN D3) 1,000 unit cap 2,000 Units daily.        omeprazole (PRILOSEC) 20 mg capsule Take 20 mg by mouth two (2) times a day.        potassium chloride SR (KLOR-CON 10) 10 mEq tablet Take 10 mEq by mouth every other day.        tamsulosin (FLOMAX) 0.4 mg capsule Take 0.4 mg by mouth nightly.        clonazePAM (KLONOPIN) 1 mg tablet Take 0.5 mg by mouth nightly.        triamterene-hydroCHLOROthiazide (DYAZIDE) 37.5-25 mg per capsule TK 1 C PO QD   6    tadalafil (CIALIS) 5 mg tablet Take 5 mg by mouth as needed.        atorvastatin (LIPITOR) 80 mg tablet Take 80 mg by mouth nightly.  Indications: hypercholesterolemia        turmeric root extract 500 mg cap 1 per mouth daily        co-enzyme Q-10 (CO Q-10) 100 mg capsule Take 200 mg by mouth daily.                ROS  Negative except HPI     Physical Exam     Cranial Nerves: MARIYA, EOM's full, no nystagmus, no ptosis. Facial sensation is normal. Facial movement is symmetric. Hearing is normal bilaterally. Palate is midline with normal sternocleidomastoid and trapezius muscles are normal. Tongue is midline. Motor:  5/5 strength in upper and lower proximal and distal muscles. Normal bulk and tone. No fasciculations. Reflexes:    Not done   Sensory:   Normal to touch. Gait:  Normal gait. Tremor:   No tremor noted. Cerebellar:  No cerebellar signs present.               FILMS: Cerebral angiogram 11/26/2019 showed continued filling of the dural AVF from the right ICA dural branch, the left vertebral artery, and several new branches from the left ECA. There is significant cortical venous drainage and a large venous varix present.      Assessment & Plan:  I had a good discussion with the patient about the fistula and the angiogram findings. I explained again that the fistula is still filling and that it has recruited some new branches. I also went over that trying to treat the fistula again by taking the vertebral artery branches has increased risk because that artery supplies the brain. The literature states that fistulas have anywhere from 3-20% chance of rupture. The risk of treatment is about 5-8% chance that either bleeding or ischemic stroke can happen resulting in neurological injury. I explained that trying to treat the fistula again is reasonable and so is waiting and repeating the angiogram in 3-6 months to see if it will recruit more branches. I do not recommend open surgery for him given his age. They decided to go ahead with repeat embolization of the fistula. Consent was obtained. All their questions were answered.

## 2020-03-09 NOTE — ANESTHESIA PROCEDURE NOTES
Arterial Line Placement    Start time: 3/9/2020 8:14 AM  End time: 3/9/2020 8:16 AM  Performed by: Madie Baca CRNA  Authorized by: Chrissy Huff MD     Pre-Procedure  Indications:  Arterial pressure monitoring and blood sampling  Preanesthetic Checklist: patient identified, risks and benefits discussed, anesthesia consent and site marked      Procedure:   Prep:  Chlorhexidine  Orientation:  Left  Location:  Radial artery  Catheter size:  20 G  Number of attempts:  1    Assessment:   Post-procedure:  Sterile dressing applied and line secured  Patient Tolerance:  Patient tolerated the procedure well with no immediate complications

## 2020-03-09 NOTE — PROGRESS NOTES
Patient feels like he has to void and has lots of pressure in bladder, tried urinal multiple times without success. Bladder scanned and 150 mL found in bladder. Informed Frantz Lua NP, about patients status and orders to in and out cath received. 400 mL of clear urine received from in and out cath, patient felt immediate relief. Will continue to monitor.

## 2020-03-09 NOTE — ANESTHESIA POSTPROCEDURE EVALUATION
Procedure(s):  PIPELINE EMBOLIZATION OF DURAL AV FISTULA NEURO MONITORING. total IV anesthesia    Anesthesia Post Evaluation      Multimodal analgesia: multimodal analgesia used between 6 hours prior to anesthesia start to PACU discharge  Patient location during evaluation: PACU  Patient participation: complete - patient participated  Level of consciousness: awake and alert  Pain management: adequate  Airway patency: patent  Anesthetic complications: no  Cardiovascular status: acceptable and hemodynamically stable  Respiratory status: acceptable  Hydration status: acceptable        Vitals Value Taken Time   /59 3/9/2020  1:16 PM   Temp 36.3 °C (97.4 °F) 3/9/2020 12:16 PM   Pulse 73 3/9/2020  1:20 PM   Resp 65 3/9/2020  1:20 PM   SpO2 98 % 3/9/2020  1:20 PM   Vitals shown include unvalidated device data.

## 2020-03-09 NOTE — PERIOP NOTES
TRANSFER - OUT REPORT:    Verbal report given to CRISTI Smith on [employer].  being transferred to [employer] for routine post - op       Report consisted of patients Situation, Background, Assessment and   Recommendations(SBAR). Information from the following report(s) SBAR, OR Summary, Procedure Summary, Intake/Output and MAR was reviewed with the receiving nurse. Lines:   Peripheral IV 03/09/20 Left Antecubital (Active)   Site Assessment Clean, dry, & intact 3/9/2020 12:16 PM   Phlebitis Assessment 0 3/9/2020 12:16 PM   Infiltration Assessment 0 3/9/2020 12:16 PM   Dressing Status Clean, dry, & intact 3/9/2020 12:16 PM   Dressing Type Tape;Transparent 3/9/2020 12:16 PM   Hub Color/Line Status Infusing;Patent 3/9/2020 12:16 PM       Peripheral IV 03/09/20 Right Wrist (Active)   Site Assessment Clean, dry, & intact 3/9/2020 12:16 PM   Phlebitis Assessment 0 3/9/2020 12:16 PM   Infiltration Assessment 0 3/9/2020 12:16 PM   Dressing Status Clean, dry, & intact 3/9/2020 12:16 PM   Dressing Type Tape;Transparent 3/9/2020 12:16 PM   Hub Color/Line Status Green; Infusing 3/9/2020 12:16 PM       Arterial Line 03/09/20 Left Radial artery (Active)   Site Assessment Clean, dry, & intact 3/9/2020 12:16 PM   Dressing Status Clean, dry, & intact 3/9/2020 12:16 PM   Dressing Type Tape;Transparent 3/9/2020 12:16 PM   Line Status Intact and in place 3/9/2020 12:16 PM   Treatment Zeroed or re-zeroed 3/9/2020 12:16 PM   Affected Extremity/Extremities Color distal to insertion site pink (or appropriate for race); Pulses palpable;Range of motion performed 3/9/2020 12:16 PM        Opportunity for questions and clarification was provided. Patient transported with:   Monitor  O2 @ 2 liters  Registered Nurse    VTE prophylaxis orders have been written for [employer]. .    Patient and family given floor number and nurses name. Family updated re: pt status after security code verified.

## 2020-03-09 NOTE — PROGRESS NOTES
Primary Nurse Tom Florence and Micheal Corley RN performed a dual skin assessment on this patient No impairment noted. Patient skin warm and intact. Puncture site to R groin closed with angioseal post procedure, C/D/I and no hematoma. R pedal pulse palpable. R radial art line C/D/I. NIH score of a 0. Patient with no concerns at this time.

## 2020-03-09 NOTE — OP NOTES
Procedure: Attempted embolization of dural AVF  Surgeon: Dr. Sisi Dave  Pre-op Dx: s/p embolization of dural AVF in  8/2019 with continued filling  Post-op Dx: same  Anesthesia: General anesthesia  Complications: None  EBL: 10cc  Specimens: None  Findings: Filling of the dural AVF from the left occipital artery and the left vertebral artery and superior cerebellar artery. Unable to get the micro catheters far enough in the vessels to embolize them.

## 2020-03-09 NOTE — PROGRESS NOTES
Patient feels like he has to void again, tried sitting on side of bed, standing and using the urinal and sitting on the toilet without success. Informed Renetta, NP and orders to place montes de oca. After montes de oca placed, 700 mL of clear urine returned into montes de oca bag.

## 2020-03-09 NOTE — ADDENDUM NOTE
Addendum  created 03/09/20 1322 by Gwen Pereira MD    Attestation recorded in [address] [place] [address], [facility] 97 filed

## 2020-03-09 NOTE — PROGRESS NOTES
TRANSFER - IN REPORT:    Verbal report received from Summer on [address] [facility].  being received from PACU for routine progression of care      Report consisted of patients Situation, Background, Assessment and   Recommendations(SBAR). Information from the following report(s) SBAR, Kardex, ED Summary, Procedure Summary, MAR, Recent Results, Med Rec Status and Cardiac Rhythm NSR was reviewed with the receiving nurse. Opportunity for questions and clarification was provided. Assessment completed upon patients arrival to unit and care assumed.

## 2020-03-10 ENCOUNTER — APPOINTMENT (OUTPATIENT)
Dept: CT IMAGING | Age: 75
DRG: 093 | End: 2020-03-10
Attending: NEUROLOGICAL SURGERY
Payer: MEDICARE

## 2020-03-10 VITALS
OXYGEN SATURATION: 98 % | TEMPERATURE: 97.7 F | RESPIRATION RATE: 14 BRPM | BODY MASS INDEX: 24.4 KG/M2 | DIASTOLIC BLOOD PRESSURE: 67 MMHG | WEIGHT: 160.5 LBS | SYSTOLIC BLOOD PRESSURE: 125 MMHG | HEART RATE: 57 BPM

## 2020-03-10 LAB
ANION GAP SERPL CALC-SCNC: 7 MMOL/L (ref 7–16)
BASOPHILS # BLD: 0 K/UL (ref 0–0.2)
BASOPHILS NFR BLD: 0 % (ref 0–2)
BUN SERPL-MCNC: 9 MG/DL (ref 8–23)
CALCIUM SERPL-MCNC: 8.6 MG/DL (ref 8.3–10.4)
CHLORIDE SERPL-SCNC: 111 MMOL/L (ref 98–107)
CO2 SERPL-SCNC: 24 MMOL/L (ref 21–32)
CREAT SERPL-MCNC: 0.85 MG/DL (ref 0.8–1.5)
DIFFERENTIAL METHOD BLD: ABNORMAL
EOSINOPHIL # BLD: 0 K/UL (ref 0–0.8)
EOSINOPHIL NFR BLD: 0 % (ref 0.5–7.8)
ERYTHROCYTE [DISTWIDTH] IN BLOOD BY AUTOMATED COUNT: 14.3 % (ref 11.9–14.6)
GLUCOSE SERPL-MCNC: 116 MG/DL (ref 65–100)
HCT VFR BLD AUTO: 34.2 % (ref 41.1–50.3)
HGB BLD-MCNC: 11.1 G/DL (ref 13.6–17.2)
IMM GRANULOCYTES # BLD AUTO: 0 K/UL (ref 0–0.5)
IMM GRANULOCYTES NFR BLD AUTO: 0 % (ref 0–5)
LYMPHOCYTES # BLD: 1.7 K/UL (ref 0.5–4.6)
LYMPHOCYTES NFR BLD: 17 % (ref 13–44)
MCH RBC QN AUTO: 28.7 PG (ref 26.1–32.9)
MCHC RBC AUTO-ENTMCNC: 32.5 G/DL (ref 31.4–35)
MCV RBC AUTO: 88.4 FL (ref 79.6–97.8)
MONOCYTES # BLD: 0.9 K/UL (ref 0.1–1.3)
MONOCYTES NFR BLD: 9 % (ref 4–12)
NEUTS SEG # BLD: 7 K/UL (ref 1.7–8.2)
NEUTS SEG NFR BLD: 73 % (ref 43–78)
NRBC # BLD: 0 K/UL (ref 0–0.2)
PLATELET # BLD AUTO: 159 K/UL (ref 150–450)
PMV BLD AUTO: 11.1 FL (ref 9.4–12.3)
POTASSIUM SERPL-SCNC: 3.3 MMOL/L (ref 3.5–5.1)
RBC # BLD AUTO: 3.87 M/UL (ref 4.23–5.6)
SODIUM SERPL-SCNC: 142 MMOL/L (ref 136–145)
WBC # BLD AUTO: 9.7 K/UL (ref 4.3–11.1)

## 2020-03-10 PROCEDURE — 70450 CT HEAD/BRAIN W/O DYE: CPT

## 2020-03-10 PROCEDURE — 70496 CT ANGIOGRAPHY HEAD: CPT

## 2020-03-10 PROCEDURE — 85025 COMPLETE CBC W/AUTO DIFF WBC: CPT

## 2020-03-10 PROCEDURE — 74011000258 HC RX REV CODE- 258: Performed by: NURSE PRACTITIONER

## 2020-03-10 PROCEDURE — 74011250637 HC RX REV CODE- 250/637: Performed by: NURSE PRACTITIONER

## 2020-03-10 PROCEDURE — 74011636320 HC RX REV CODE- 636/320: Performed by: NURSE PRACTITIONER

## 2020-03-10 PROCEDURE — 99238 HOSP IP/OBS DSCHRG MGMT 30/<: CPT | Performed by: NURSE PRACTITIONER

## 2020-03-10 PROCEDURE — 80048 BASIC METABOLIC PNL TOTAL CA: CPT

## 2020-03-10 PROCEDURE — 74011250637 HC RX REV CODE- 250/637: Performed by: NEUROLOGICAL SURGERY

## 2020-03-10 PROCEDURE — 74011250636 HC RX REV CODE- 250/636: Performed by: NURSE PRACTITIONER

## 2020-03-10 PROCEDURE — 77030041247 HC PROTECTOR HEEL HEELMEDIX MDII -B

## 2020-03-10 PROCEDURE — 36591 DRAW BLOOD OFF VENOUS DEVICE: CPT

## 2020-03-10 RX ORDER — SODIUM CHLORIDE 0.9 % (FLUSH) 0.9 %
10 SYRINGE (ML) INJECTION
Status: COMPLETED | OUTPATIENT
Start: 2020-03-10 | End: 2020-03-10

## 2020-03-10 RX ADMIN — SODIUM CHLORIDE 100 ML: 900 INJECTION, SOLUTION INTRAVENOUS at 09:12

## 2020-03-10 RX ADMIN — IOPAMIDOL 50 ML: 755 INJECTION, SOLUTION INTRAVENOUS at 09:11

## 2020-03-10 RX ADMIN — METOPROLOL TARTRATE 25 MG: 25 TABLET, FILM COATED ORAL at 08:11

## 2020-03-10 RX ADMIN — POTASSIUM BICARBONATE 40 MEQ: 782 TABLET, EFFERVESCENT ORAL at 08:11

## 2020-03-10 RX ADMIN — PANTOPRAZOLE SODIUM 40 MG: 40 TABLET, DELAYED RELEASE ORAL at 08:11

## 2020-03-10 RX ADMIN — ASPIRIN 81 MG: 81 TABLET ORAL at 08:11

## 2020-03-10 RX ADMIN — Medication 10 ML: at 09:12

## 2020-03-10 RX ADMIN — ONDANSETRON 4 MG: 2 INJECTION INTRAMUSCULAR; INTRAVENOUS at 04:58

## 2020-03-10 NOTE — INTERDISCIPLINARY ROUNDS
Interdisciplinary team rounds were held 3/10/2020 with the following team members:Care Management, Nursing, Nurse Practitioner, Nutrition, Pastoral Care, Pharmacy, Physical Therapy, Physician, Respiratory Therapy and Clinical Coordinator and the patient. Plan of care discussed. See clinical pathway and/or care plan for interventions and desired outcomes.

## 2020-03-10 NOTE — PROGRESS NOTES
Bedside shift change report given to Kathe Degroot, RN (oncoming nurse) by Maribel Kilpatrick RN (offgoing nurse). Report included the following information SBAR, Kardex, Intake/Output, Recent Results, Med Rec Status and Cardiac Rhythm NSR. Dual neuro assessment completed.  NIH 0.

## 2020-03-10 NOTE — PROGRESS NOTES
Pt voided another larger amount. Agustin Avitia NP notified. Pt to be discharged. Reviewed discharge instructions with pt and pt's wife, Teja Marte. Opportunity for questions provided. Reviewed follow up appts and instructions for groin, stroke sx. Wife to drive pt home.

## 2020-03-10 NOTE — PROGRESS NOTES
Pt d/c home with no needs voiced per MD or pt. Care Management Interventions  PCP Verified by CM: Yes  Mode of Transport at Discharge:  Other (see comment)  Transition of Care Consult (CM Consult): Discharge Planning  Current Support Network: Lives with Spouse  Confirm Follow Up Transport: Family  [place] Resource Information Provided?: [facility])  Discharge Location  Discharge Placement: Home

## 2020-03-10 NOTE — PROGRESS NOTES
[facility] Pt alert and oriented x4. Dual NIH and skin assessment performed. Insertion site intact with no hematoma. VS- /63. HR 65. O2 sat 98% on Room Air.

## 2020-03-10 NOTE — PROGRESS NOTES
Pt has voided 1x, small amount, after 1 L NS fluid bolus, several cups of coffee/water, ambulation around unit. Bladder scan revealed 200-250 mL in bladder. [facility], NP notified.

## 2020-03-10 NOTE — CONSULTS
LEAPFROG PROTOCOL NOTE    April Plover.  3/10/2020    The patient is currently in the critical care setting managed by Dr. Adri Galeana with dural AVF. The patient's chart is reviewed and the patient is discussed with the staff. Patient is currently hemodynamically stable. Patient has no needs identified for Intensivist management in the critical care setting at this time. Please notify us if can be of assistance. No charge billed to the patient. Thank you. CORRINE Hernandes     Agree.     Dru Sloan MD

## 2020-03-10 NOTE — PROGRESS NOTES
Initial visit was made, emotional support and a spiritual presence were provided.  card was left with the patient. His wife, Berkley Pearson was present.       YISEL Raymond

## 2020-03-10 NOTE — DISCHARGE INSTRUCTIONS
Patient Education   1. Follow up with Dr. Ridge Patel, Oncology and Dr. Shai Cash will be arranged by Bacilio Duong  2. No baths x7 days, shower only. Call if any concerns, pain, drainage from R groin puncture site. Call if any paresthesia right leg. 3. Call us for any questions or concerns. Brain Angiogram: What to Expect at [address] brain angiogram (cerebral angiogram) is a test (also called a procedure) that looks for problems with blood vessels and blood flow in the brain. The doctor inserted a thin, flexible tube (catheter) into a blood vessel in your groin. Or the doctor may have put the catheter in a blood vessel in your arm. Then he or she injected a dye into the catheter. The dye flows into the blood vessel. The dye made the blood vessels show up on a video screen. You may have had this test to see if a blood vessel in the brain is bulging, narrowed, or blocked. The test may also be used to check other symptoms, such as unusual headaches, or to check problems found during a different test.  You may have a bruise where the catheter was inserted, and you may feel sore for a day or two after a brain angiogram. You can do light activities around the house. But don't do anything strenuous for several days. This care sheet gives you a general idea about how long it will take for you to recover. But each person recovers at a different pace. Follow the steps below to feel better as quickly as possible. How can you care for yourself at home? Activity    · Do not do strenuous exercise and do not lift, pull, or push anything heavy until your doctor says it is okay. This may be for a day or two. You can walk around the house and do light activity, such as cooking.     · If the catheter was placed in your groin, try not to walk up stairs for the first couple of days.     · If the catheter was placed in your arm near your wrist, do not bend your wrist deeply for the first couple of days.  Be careful using your hand to get into and out of a chair or bed.     · If your doctor recommends it, get more exercise. Walking is a good choice. Bit by bit, increase the amount you walk every day. Try for at least 30 minutes on most days of the week. Diet    · Drink plenty of fluids to help your body flush out the dye. If you have kidney, heart, or liver disease and have to limit fluids, talk with your doctor before you increase the amount of fluids you drink.     · Keep eating a heart-healthy diet that has lots of fruits, vegetables, and whole grains. If you need help with your diet, talk to your doctor. You also may want to talk to a dietitian. This expert can help you to learn about healthy foods and plan meals. Medicines    · Your doctor will tell you if and when you can restart your medicines. He or she will also give you instructions about taking any new medicines.     · If you take blood thinners, such as warfarin (Coumadin), clopidogrel (Plavix), or aspirin, be sure to talk to your doctor. He or she will tell you if and when to start taking those medicines again. Make sure that you understand exactly what your doctor wants you to do.     · Be safe with medicines. Read and follow all instructions on the label. ? If the doctor gave you a prescription medicine for pain, take it as prescribed. ? If you are not taking a prescription pain medicine, ask your doctor if you can take an over-the-counter medicine.    Care of the catheter site    · For the first 3 days, keep a bandage over the spot where the catheter was put in.     · Put ice or a cold pack on the area for 10 to 20 minutes at a time. Try to do this every 1 to 2 hours for the next 3 days (when you are awake) or until the swelling goes down. Put a thin cloth between the ice and your skin.     · You may shower 24 to 48 hours after the procedure, if your doctor okays it. Pat the incision dry.     · Do not soak the catheter site until it is healed.  Don't take a bath for 1 week, or until your doctor tells you it is okay.     · Watch for bleeding from the site. A small amount of blood (up to the size of a quarter) on the bandage can be normal.     · If you are bleeding, lie down and press on the area for 15 minutes to try to make it stop. If the bleeding does not stop, call your doctor or seek immediate medical care. Follow-up care is a key part of your treatment and safety. Be sure to make and go to all appointments, and call your doctor if you are having problems. It's also a good idea to know your test results and keep a list of the medicines you take. When should you call for help? Call 911 anytime you think you may need emergency care. For example, call if:    · You passed out (lost consciousness).     · You have severe trouble breathing.     · You have sudden chest pain and shortness of breath, or you cough up blood.     · You have symptoms of a stroke. These may include:  ? Sudden numbness, tingling, weakness, or loss of movement in your face, arm, or leg, especially on only one side of your body. ? Sudden vision changes. ? Sudden trouble speaking. ? Sudden confusion or trouble understanding simple statements. ? Sudden problems with walking or balance. ? A sudden, severe headache that is different from past headaches.    Call your doctor now or seek immediate medical care if:    · You are bleeding from the area where the catheter was put in your artery.     · You have a fast-growing, painful lump at the catheter site.     · You have symptoms of infection, such as:  ? Increased pain, swelling, warmth, or redness. ? Red streaks leading from the area. ? Pus draining from the area. ? A fever.     · Your leg, arm, or hand is painful, looks blue, or feels cold, numb, or tingly.    Watch closely for any changes in your health, and be sure to contact your doctor if:    · You are not getting better as expected. Where can you learn more?   Go to http://thomas.info/. Enter 21  in the search box to learn more about \"Brain Angiogram: What to Expect at Home. \"  Current as of: September 26, 2018  Content Version: 12.2  © 4007-7869 [employer]. Care instructions adapted under license by [employer] (which disclaims liability or warranty for this information). If you have questions about a medical condition or this instruction, always ask your healthcare professional. [place] [address] any warranty or liability for your use of this information.

## 2020-03-11 ENCOUNTER — PATIENT OUTREACH (OUTPATIENT)
Dept: CASE MANAGEMENT | Age: 75
End: 2020-03-11

## 2020-03-11 NOTE — PROGRESS NOTES
This note will not be viewable in [address] Transition of Care Discharge Follow-up Questionnaire   Date/Time of Call:    3/11/2020 1:45 PM   What was the patient hospitalized for? Cerebrovascular malformation dural AV fistula                                                                                                                                                            Does the patient understand his/her diagnosis and/or treatment and what happened during the hospitalization? Yes, spoke with patient and he states he has an understanding of his recent hospitalization, diagnosis and treatment. Did the patient receive discharge instructions? Yes    CM Assessed Risk for Readmission:         Patient stated Risk for Readmission:        Low risk for readmission. Review any discharge instructions (see discharge instructions/AVS in [facility]). Ask patient if they understand these. Do they have any questions? Discharge instructions reviewed with patient and he has an understanding. Were home services ordered (nursing, PT, OT, ST, etc.)? No   If so, has the first visit occurred? If not, why? (Assist with coordination of services if necessary.)    N/A   Was any DME ordered? No   If so, has it been received? If not, why?  (Assist patient in obtaining DME orders &/or equipment if necessary.) N/A   Completed review of all medications (new, discontinued, and continued meds per the d/c instructions and medication tab in [facility])     No changes in medication    Were all new prescriptions filled? If not, why?  (Assist patient in obtaining medications if necessary  escalate for CCM &/or SW if ongoing issues are verbalized by pt or anticipated)    N/A   Does the patient understand the purpose and dosing instructions for all medications? (If patient has questions, provide explanation and education.)    Medications reviewed with patient and he verbalizes an understanding. Does the patient have any problems in performing ADLs? (If patient is unable to perform ADLs  what is the limiting factor(s)? Do they have a support system that can assist? If no support system is present, discuss possible assistance that they may be able to obtain. Escalate for CCM/SW if ongoing issues are verbalized by pt or anticipated)    Patient is independent with ADLs. Spouse there to assist if needed. Does the patient have all follow-up appointments scheduled? 7 day f/up with PCP?   (f/up with PCP may be w/in 14 days if patient has a f/up with their specialist w/in 7 days)     7-14 day f/up with specialist?   (or per discharge instructions)     If f/up has not been made  what actions has the care coordinator made to accomplish this? Has transportation been arranged? FU with PCP Dr. Alec BOWERS. FU with neurosurgeon Dr. Sherry Plummer scheduled for 4/22/2020. Discussed the importance of FU appointments with patient and he verbalized understanding. Patient states there are no transportation needs at this time. Any other questions or concerns expressed by the patient? Patient states that he is doing well. He states that he has no pain and is independent with ADLs. Patient does not voice and concerns at this time. Schedule next appointment with [address] Coordinator or refer to RN Case Manager/ per the workflow guidelines. When is care coordinators next follow-up call scheduled? If referred for CCM  what RN care manager       Was the referral assigned?                                      7-14 days   [facility] Call Completed By: Kelley Bo LPN Care Coordinator

## 2020-03-11 NOTE — DISCHARGE SUMMARY
.      Discharge Summary     Patient: Loren Mullen. MRN: [id number]  SSN: [id number]    YOB: 1945  Age: 66 y.o.   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]    Discharge Diagnoses:   Problem List as of 3/10/2020 Date Reviewed: 1/8/2020          Codes Class Noted - Resolved    Pain of right thumb ICD-10-CM: M79.644  ICD-9-CM: 729.5  4/1/2019 - Present        Carpal tunnel syndrome of right wrist ICD-10-CM: G56.01  ICD-9-CM: 354.0  4/1/2019 - Present        DREA (obstructive sleep apnea) ICD-10-CM: G47.33  ICD-9-CM: 327.23  4/23/2018 - Present        Hypersomnolence ICD-10-CM: G47.10  ICD-9-CM: 780.54  2/13/2017 - Present        Memory change ICD-10-CM: R41.3  ICD-9-CM: 780.93  11/3/2015 - Present        Insomnia, unspecified ICD-10-CM: G47.00  ICD-9-CM: 780.52  7/22/2015 - Present        Type II or unspecified type diabetes mellitus without mention of complication, not stated as uncontrolled ICD-10-CM: E11.9  ICD-9-CM: 250.00  7/22/2015 - Present        Depressive disorder, not elsewhere classified ICD-10-CM: F32.9  ICD-9-CM: 241  7/22/2015 - Present        Bilateral inguinal hernia ICD-10-CM: K40.20  ICD-9-CM: 550.92  1/8/2014 - Present        Osteoporosis ICD-10-CM: M81.0  ICD-9-CM: 733.00  11/15/2013 - Present        Splitting of urinary stream ICD-10-CM: R39.13  ICD-9-CM: 788.61  11/15/2013 - Present        Nodular prostate with urinary obstruction ICD-10-CM: N40.3, N13.8  ICD-9-CM: 600.11  11/15/2013 - Present        Hematuria ICD-10-CM: R31.9  ICD-9-CM: 599.70  11/15/2013 - Present        Retention of urine, unspecified ICD-10-CM: R33.9  ICD-9-CM: 788.20  11/15/2013 - Present        Elevated prostate specific antigen (PSA) ICD-10-CM: R97.20  ICD-9-CM: 790.93  11/15/2013 - Present        Impotence of organic origin ICD-10-CM: N52.9  ICD-9-CM: 607.84 11/15/2013 - Present        Chronic prostatitis ICD-10-CM: N41.1  ICD-9-CM: 601.1  11/15/2013 - Present        regrowth of Nodular BPH post prior GreenLight laser procedure ICD-10-CM: N40.1, N13.8  ICD-9-CM: 600.01, 599.69  2/15/2013 - Present        Lumbar stenosis with neurogenic claudication ICD-10-CM: V23.463  ICD-9-CM: 724.03  3/1/2012 - Present        GERD (gastroesophageal reflux disease) ICD-10-CM: K21.9  ICD-9-CM: 530.81  Unknown - Present    Overview Signed 3/1/2012  1:06 PM by Jess Macario MD     Prilosec daily             Arthritis ICD-10-CM: M19.90  ICD-9-CM: 716.90  Unknown - Present    Overview Signed 3/1/2012  1:06 PM by Jess Macario MD     hands             Hypercholesteremia ICD-10-CM: E78.00  ICD-9-CM: 272.0  Unknown - Present        ADD (attention deficit disorder) ICD-10-CM: F98.8  ICD-9-CM: 314.00  Unknown - Present        RESOLVED: Snoring ICD-10-CM: R06.83  ICD-9-CM: 786.09  2/13/2017 - 3/1/2017        RESOLVED: Diabetes mellitus type 2, controlled ([facility] ICD-10-CM: E11.9  ICD-9-CM: 250.00  2/3/2016 - 3/21/2018        RESOLVED: Aneurysm of unspecified site [facility]) ICD-10-CM: I72.9  ICD-9-CM: 442.9  7/22/2015 - 8/23/2017        * (Principal) Cerebrovascular malformation, dural AV fistula ICD-10-CM: I67.1  ICD-9-CM: 437.3  8/9/2019 - Present               Discharge Condition: Good    Hospital Course: Mr. Fito Malave was admitted on 3-9-20 for embolization of his known dural AV fistula. Mr. Fito Malave had prior embolization of this fistula in August of 2019. Dr. Laure Silver was unable to get the micro catheters far enough in vessels to visualize left occipital artery and the left vertebral artery and superior cerebellar artery to perform the embolization. Mr. Fito Malave was placed in ICU to monitor overnight. He has a history of urinary retention and during admit he was unable to urinate after removal of montes de oca, it was placed back and her was started back on his home flomax.  We removed montes de oca on am of 3-10-20, gave IVF and he was able to void prior to dc home this afternoon. His R groin was CDI with no signs of hematoma. Pulses are +2. Pt aox3 and in nad. Dr. Stevan Olivier discussed options for treatment with MR. Allan Sahni and his wife. He will be referred to Dr. Gregory Rick, Oncology, and will have Radiosurgery to treat his dural AV fistula. He will follow back up with Dr. Stevan Olivier in clinic as scheduled. Mr. lAlan Sahni stable upon dc. Consults: None  Significant Diagnostic Studies: labs: cerebral angiogram. CT head, CTA head and neck. Disposition: home    Discharge Medications:   Cannot display discharge medications since this patient is not currently admitted. Activity: Activity as tolerated  Diet: Regular Diet  Wound Care: R groin: no baths x7days, shower only. Call if any concerns for infection, increased pain, drainage. Numbness to your right leg. DC time 25minutes for dc instructions/med recs. Follow-up Appointments   Procedures    FOLLOW UP VISIT Appointment in: Other (Specify)     Standing Status:   Standing     Number of Occurrences:   1     Order Specific Question:   Appointment in     Answer:    Other (Specify)       Signed By: Miley Lopez NP     March 11, 2020

## 2020-03-23 ENCOUNTER — PATIENT OUTREACH (OUTPATIENT)
Dept: CASE MANAGEMENT | Age: 75
End: 2020-03-23

## 2020-03-23 NOTE — PROGRESS NOTES
Transitions of Care  Follow up Outreach Note   Outreach type Phone call: Spoke with patient      Home visit:   Date/Time of Outreach: 3/23/2020 12:15 PM      Has patient attended PCP or specialist follow-up appointments since last contact? What was outcome of appointment? When is next follow-up scheduled? FU with PCP Dr. Chavez BOWERS. FU with neurosurgeon Dr. Garcia Salmon scheduled for 4/22/2020. All other appointments scheduled appropriately. Review medications. Any medication changes since last outreach? Does patient have any questions or issues related to their medications? Medications reviewed with patient. He verbalizes an understanding of medications. Home health active? If yes  any issue? Progress? No      Referrals needed?  (CM, SW, HH, etc. )     No    Other issues/Miscellaneous? (Transportation, access to meals, ability to perform ADLs, adequate caregiver support, etc.) Patient states that he is doing well and is recovering nicely. He is able to perform all ADLs independently. Patient does not voice any concern at this time. Next Outreach Scheduled? Graduation from program?    15-30 days       Next Steps/Goals (if applicable):             Outreach completed by:    Iam Higginbotham LPN Care Coordinator yes

## 2020-04-15 ENCOUNTER — PATIENT OUTREACH (OUTPATIENT)
Dept: CASE MANAGEMENT | Age: 75
End: 2020-04-15

## 2020-04-15 NOTE — PROGRESS NOTES
This note will not be viewable in [address] Attempted contacting patient for 30 day FU. No answer on home phone. Per CC patient has completed FU's with the cancer center on 3/17/2020. Patient has appointment with neurosurgeon on 4/22/2020 with all other FU's scheduled appropriately. No further [facility] outreach will be made as patient is following given POC. Episode closed.

## 2020-04-24 ENCOUNTER — HOSPITAL ENCOUNTER (OUTPATIENT)
Dept: GENERAL RADIOLOGY | Age: 75
Discharge: HOME | End: 2020-04-24
Attending: INTERNAL MEDICINE
Payer: MEDICARE

## 2020-04-24 DIAGNOSIS — R06.02 SOB (SHORTNESS OF BREATH): ICD-10-CM

## 2020-04-24 PROBLEM — R06.09 DYSPNEA ON EXERTION: Status: ACTIVE | Noted: 2020-04-24

## 2020-04-24 PROCEDURE — 71046 X-RAY EXAM CHEST 2 VIEWS: CPT

## 2020-07-21 PROCEDURE — 61798 SRS CRANIAL LESION COMPLEX: CPT | Performed by: NEUROLOGICAL SURGERY

## 2020-07-22 NOTE — OP NOTES
[address] [facility]  OPERATIVE REPORT    Name:  Randa Browning  MR#:  [id number]  :  1945  ACCOUNT #:  [de-identified]  DATE OF SERVICE:  2020    PREOPERATIVE DIAGNOSIS:  Dural arteriovenous fistula. POSTOPERATIVE DIAGNOSIS:  Dural arteriovenous fistula    PROCEDURE PERFORMED:  Radiosurgery of his dural arteriovenous fistula with CyberKnife with 22 gray to the 80% isodose line. SURGEON:  Stevenson Villar MD    ASSISTANT:  None    ANESTHESIA:  None. COMPLICATIONS:  None. SPECIMENS REMOVED:  None. IMPLANTS:  None. ESTIMATED BLOOD LOSS:  0cc. INDICATIONS:  The patient is a 12-year-old male with a dural arteriovenous fistula that underwent previous embolization. Followup cerebral angiogram showed that he had continued filling of his fistula. He did have an attempted re-embolization; however, it was unsuccessful and his arteriovenous fistula could not be shut down via endovascular means. After discussing the risks and benefits with the patient, he elected to undergo radiosurgery for his dural arteriovenous fistula using CyberKnife. PROCEDURE:  The patient underwent fitting for a stereotactic mask for the CyberKnife. He then had a CT angiogram performed. Once all of this data was entered into the CyberKnife planner computer, I drew the target for the procedure. The physicist and the radiation ocologist then, along with my help, made a plan to treat the arteriovenous fistula with 22 gray to the 80% isodose line. This meant that 0.9 mL will get a total of 22 gray and 12.44 mL will get 12 gray. This gives the patient approximately a 5% chance of radiation necrosis. The patient then underwent the actual treatment on 2020. The patient tolerated the procedure well. He had no issues or complications.     Ricarda Carrion MD      [id number]  D:  2020 9:11  T:  2020 12:37  JOB #:  [id number]

## 2021-01-20 ENCOUNTER — HOSPITAL ENCOUNTER (OUTPATIENT)
Dept: MRI IMAGING | Age: 76
Discharge: HOME | End: 2021-01-20
Attending: NEUROLOGICAL SURGERY
Payer: MEDICARE

## 2021-01-20 DIAGNOSIS — I67.1 CEREBROVASCULAR DURAL AV FISTULA: ICD-10-CM

## 2021-01-20 PROCEDURE — 70551 MRI BRAIN STEM W/O DYE: CPT

## 2021-03-18 ENCOUNTER — HOSPITAL ENCOUNTER (OUTPATIENT)
Dept: MRI IMAGING | Age: 76
Discharge: HOME | End: 2021-03-18
Attending: NEUROLOGICAL SURGERY
Payer: MEDICARE

## 2021-03-18 DIAGNOSIS — N88.9 CERVICAL LESION: ICD-10-CM

## 2021-03-18 PROCEDURE — 72156 MRI NECK SPINE W/O & W/DYE: CPT

## 2021-03-18 PROCEDURE — A9575 INJ GADOTERATE MEGLUMI 0.1ML: HCPCS | Performed by: NEUROLOGICAL SURGERY

## 2021-03-18 PROCEDURE — 74011250636 HC RX REV CODE- 250/636: Performed by: NEUROLOGICAL SURGERY

## 2021-03-18 RX ORDER — GADOTERATE MEGLUMINE 376.9 MG/ML
13 INJECTION INTRAVENOUS
Status: COMPLETED | OUTPATIENT
Start: 2021-03-18 | End: 2021-03-18

## 2021-03-18 RX ORDER — SODIUM CHLORIDE 0.9 % (FLUSH) 0.9 %
10 SYRINGE (ML) INJECTION
Status: COMPLETED | OUTPATIENT
Start: 2021-03-18 | End: 2021-03-18

## 2021-03-18 RX ADMIN — GADOTERATE MEGLUMINE 13 ML: 376.9 INJECTION INTRAVENOUS at 10:38

## 2021-03-18 RX ADMIN — Medication 10 ML: at 10:38

## 2021-05-03 DIAGNOSIS — I67.1 DURAL ARTERIOVENOUS FISTULA: Primary | ICD-10-CM

## 2021-05-03 DIAGNOSIS — D68.9 BLOOD CLOTTING DISORDER (HCC): ICD-10-CM

## 2021-05-04 ENCOUNTER — HOSPITAL ENCOUNTER (OUTPATIENT)
Dept: OTHER | Age: 76
Discharge: HOME | End: 2021-05-04
Attending: NEUROLOGICAL SURGERY
Payer: MEDICARE

## 2021-05-04 VITALS
BODY MASS INDEX: 21.98 KG/M2 | DIASTOLIC BLOOD PRESSURE: 68 MMHG | OXYGEN SATURATION: 99 % | WEIGHT: 145 LBS | RESPIRATION RATE: 16 BRPM | SYSTOLIC BLOOD PRESSURE: 122 MMHG | TEMPERATURE: 97.2 F | HEART RATE: 51 BPM | HEIGHT: 68 IN

## 2021-05-04 DIAGNOSIS — I67.1 DURAL ARTERIOVENOUS FISTULA: ICD-10-CM

## 2021-05-04 PROCEDURE — C1887 CATHETER, GUIDING: HCPCS

## 2021-05-04 PROCEDURE — 36226 PLACE CATH VERTEBRAL ART: CPT | Performed by: NEUROLOGICAL SURGERY

## 2021-05-04 PROCEDURE — C1894 INTRO/SHEATH, NON-LASER: HCPCS

## 2021-05-04 PROCEDURE — 74011250636 HC RX REV CODE- 250/636: Performed by: NEUROLOGICAL SURGERY

## 2021-05-04 PROCEDURE — 77030012468 HC VLV BLEEDBK CNTRL ABBT -B

## 2021-05-04 PROCEDURE — 74011000636 HC RX REV CODE- 636: Performed by: NEUROLOGICAL SURGERY

## 2021-05-04 PROCEDURE — 76937 US GUIDE VASCULAR ACCESS: CPT

## 2021-05-04 PROCEDURE — 36224 PLACE CATH CAROTD ART: CPT | Performed by: NEUROLOGICAL SURGERY

## 2021-05-04 PROCEDURE — C1769 GUIDE WIRE: HCPCS

## 2021-05-04 PROCEDURE — C1760 CLOSURE DEV, VASC: HCPCS

## 2021-05-04 PROCEDURE — 74011000250 HC RX REV CODE- 250: Performed by: NEUROLOGICAL SURGERY

## 2021-05-04 PROCEDURE — 36227 PLACE CATH XTRNL CAROTID: CPT | Performed by: NEUROLOGICAL SURGERY

## 2021-05-04 RX ORDER — HEPARIN SODIUM 200 [USP'U]/100ML
1000-8000 INJECTION, SOLUTION INTRAVENOUS CONTINUOUS
Status: DISCONTINUED | OUTPATIENT
Start: 2021-05-04 | End: 2021-05-04

## 2021-05-04 RX ORDER — SODIUM CHLORIDE 9 MG/ML
500 INJECTION, SOLUTION INTRAVENOUS CONTINUOUS
Status: DISCONTINUED | OUTPATIENT
Start: 2021-05-04 | End: 2021-05-04

## 2021-05-04 RX ORDER — FENTANYL CITRATE 50 UG/ML
25-100 INJECTION, SOLUTION INTRAMUSCULAR; INTRAVENOUS
Status: DISCONTINUED | OUTPATIENT
Start: 2021-05-04 | End: 2021-05-04

## 2021-05-04 RX ORDER — ONDANSETRON 2 MG/ML
4 INJECTION INTRAMUSCULAR; INTRAVENOUS
Status: DISCONTINUED | OUTPATIENT
Start: 2021-05-04 | End: 2021-05-08 | Stop reason: HOSPADM

## 2021-05-04 RX ORDER — MIDAZOLAM HYDROCHLORIDE 1 MG/ML
.5-2 INJECTION, SOLUTION INTRAMUSCULAR; INTRAVENOUS
Status: DISCONTINUED | OUTPATIENT
Start: 2021-05-04 | End: 2021-05-04

## 2021-05-04 RX ORDER — LIDOCAINE HYDROCHLORIDE 20 MG/ML
20-300 INJECTION, SOLUTION INFILTRATION; PERINEURAL
Status: DISCONTINUED | OUTPATIENT
Start: 2021-05-04 | End: 2021-05-04

## 2021-05-04 RX ORDER — ACETAMINOPHEN 325 MG/1
650 TABLET ORAL
Status: DISCONTINUED | OUTPATIENT
Start: 2021-05-04 | End: 2021-05-08 | Stop reason: HOSPADM

## 2021-05-04 RX ADMIN — MIDAZOLAM 1 MG: 1 INJECTION INTRAMUSCULAR; INTRAVENOUS at 08:21

## 2021-05-04 RX ADMIN — FENTANYL CITRATE 50 MCG: 50 INJECTION, SOLUTION INTRAMUSCULAR; INTRAVENOUS at 08:21

## 2021-05-04 RX ADMIN — HEPARIN SODIUM 4000 UNITS: 200 INJECTION, SOLUTION INTRAVENOUS at 08:38

## 2021-05-04 RX ADMIN — FENTANYL CITRATE 50 MCG: 50 INJECTION, SOLUTION INTRAMUSCULAR; INTRAVENOUS at 08:32

## 2021-05-04 RX ADMIN — LIDOCAINE HYDROCHLORIDE 40 MG: 20 INJECTION, SOLUTION INFILTRATION; PERINEURAL at 08:33

## 2021-05-04 RX ADMIN — IOPAMIDOL 150 ML: 612 INJECTION, SOLUTION INTRAVENOUS at 09:10

## 2021-05-04 RX ADMIN — HEPARIN SODIUM 4000 UNITS: 200 INJECTION, SOLUTION INTRAVENOUS at 09:00

## 2021-05-04 RX ADMIN — SODIUM CHLORIDE: 900 INJECTION, SOLUTION INTRAVENOUS at 08:21

## 2021-05-04 NOTE — PROGRESS NOTES
Pt to [facility] via stretcher. IR Nurse Pre-Procedure Checklist Part 2          Consent signed: Yes    H&P complete:  Yes    Antibiotics: Not applicable    Airway/Mallampati Done: Yes    Shaved: Yes    Pregnancy Form:Not applicable    Patient Position: Yes    MD Side: Yes     Biopsy Worksheet: Not applicable    Specimen Medium: Not applicable      The patient was counseled at length about the risks of malik Covid-19 during their perioperative period and any recovery window from their procedure. The patient was made aware that malik Covid-19  may worsen their prognosis for recovering from their procedure and lend to a higher morbidity and/or mortality risk. All material risks, benefits, and reasonable alternatives including postponing the procedure were discussed. The patient does  wish to proceed with the procedure at this time.

## 2021-05-04 NOTE — PROGRESS NOTES
Pt ambulatory from stretcher down the hallway without difficulty. Pt denies any problems with ambulating.

## 2021-05-04 NOTE — OP NOTES
Procedure: Cerebral angiogram  Surgeon: Dr. Clari Johns  Pre-op Dx: s/p embolization of dural AVF 8/2019 and SRS of fistula 4/2020  Post-op Dx: same  Anesthesia: Conscious sedation with fentanyl and versed  Complications: None  EBL: 10cc  Specimens: None  Findings: Minimal filling of fistula via the right ICA.

## 2021-05-04 NOTE — H&P
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 He has been doing well. His blurry vision has resolved. He still has some unsteady gait.  He did PT and that helped some.           Past Medical History:   Diagnosis Date    ADD (attention deficit disorder)       controlled with Adderall    Anemia, unspecified      Aneurysm of unspecified site [facility])       Patient denies    Anxiety      Arthritis       hands, back    Attention deficit disorder with hyperactivity(314.01)      Benign paroxysmal vertigo      BPH (benign prostatic hypertrophy)      CAD (coronary artery disease)       2 stents placed 09/14/17; denes MI    Carpal tunnel syndrome of right wrist 4/1/2019    Chronic prostatitis 11/15/2013    Colitis, enteritis, and gastroenteritis of presumed infectious origin      Depressive disorder, not elsewhere classified       Patient denies depression but has anxiety    Disorders of bursae and tendons in shoulder region, unspecified      Fatty liver      Former smoker      GERD (gastroesophageal reflux disease)       controlled w/med    History of tetanus, diphtheria, and acellular pertussis booster vaccination (Tdap)       done 2010    Hypercholesteremia      Hypertrophy of prostate with urinary obstruction and other lower urinary tract symptoms (LUTS) 11/15/2013    Impotence of organic origin      Inguinal hernia 2014     bilat    Insomnia, unspecified      Medial epicondylitis of elbow      Nonspecific elevation of levels of transaminase or lactic acid dehydrogenase (LDH)      Orthostatic hypotension      Osteoporosis 11/15/2013    Other and unspecified hyperlipidemia      Pain of right thumb 4/1/2019    Sleep apnea       No CPAP     Tremor       from Adderall, Inderal prn    Type II or unspecified type diabetes mellitus without mention of complication, not stated as uncontrolled       per pt. \"pre-diabetic\"; no treatment; last A1C 6.2    Vertigo                          Allergies   Allergen Reactions    Other Medication Other (comments)       Allergy to vinegar \"my nose runs\" and allergy to yeast \"my nose gets stuffy;' red wine-runny nose       Red Wine Extract Other (comments)       Allergy to vinegar \"my nose runs\" and allergy to yeast \"my nose gets stuffy;' red wine-runny nose  Other reaction(s): Other (see comments)  Allergy to vinegar \"my nose runs\" and allergy to yeast \"my nose gets stuffy;' red wine-runny nose  Allergy to vinegar \"my nose runs\" and allergy to yeast \"my nose gets stuffy;' red wine-runny nose         Family History   Problem Relation Age of Onset    Glaucoma Mother      Hypertension Mother      Stroke Mother      Stroke Brother      Heart Disease Brother      Coronary Artery Disease Brother      Heart Disease Father      Cancer Father           esophageal    Coronary Artery Disease Father           Social History            Socioeconomic History    Marital status:        Spouse name: Not on file    Number of children: Not on file    Years of education: Not on file    Highest education level: Not on file   Occupational History    Not on file   Social Needs    Financial resource strain: Not on file    Food insecurity       Worry: Not on file       Inability: Not on file    Transportation needs       Medical: Not on file       Non-medical: Not on file   Tobacco Use    Smoking status: Former Smoker       Packs/day: 1.00       Years: 10.00       Pack years: 10.00       Types: Cigarettes, Pipe, Cigars       Quit date: 1977       Years since quittin.5    Smokeless tobacco: Never Used   Substance and Sexual Activity    Alcohol use:  Yes       Alcohol/week: 5.8 standard drinks       Types: 7 Glasses of wine per week       Comment: occass, 3 drinks per week    Drug use: No    Sexual activity: Not Currently Lifestyle    Physical activity       Days per week: Not on file       Minutes per session: Not on file    Stress: Not on file   Relationships    Social connections       Talks on phone: Not on file       Gets together: Not on file       Attends [employer] service: Not on file       Active member of club or organization: Not on file       Attends meetings of clubs or organizations: Not on file       Relationship status: Not on file    Intimate partner violence       Fear of current or ex partner: Not on file       Emotionally abused: Not on file       Physically abused: Not on file       Forced sexual activity: Not on file   Other Topics Concern    Not on file   Social History Narrative    Not on file                Current Outpatient Medications   Medication Sig Dispense Refill    dextroamphetamine-amphetamine (ADDERALL) 5 mg tablet Take 5 mg by mouth daily.        famotidine (PEPCID) 40 mg tablet Take 40 mg by mouth two (2) times a day.        loperamide HCl (IMODIUM PO) Take  by mouth.        MILK THISTLE PO Take  by mouth.        ascorbic acid, vitamin C, (VITAMIN C) 500 mg tablet Take 1,000 mg by mouth daily.        metoprolol tartrate (LOPRESSOR) 25 mg tablet Take 0.5 Tabs by mouth two (2) times a day. Indications: high blood pressure 30 Tab 5    LORazepam (ATIVAN) 1 mg tablet Take 1 Tab by mouth as needed for Anxiety (Anxiety prior to MRI study) for up to 2 doses. Max Daily Amount: 96 mg. Indications: anxious 2 Tab 0    atorvastatin (LIPITOR) 80 mg tablet Take 1 Tab by mouth nightly.  Indications: high cholesterol 90 Tab 3    multivitamin (ONE A DAY) tablet Take 1 Tab by mouth daily.        DRYSOL 20 % external solution JOSEFA AA AS DIRECTED FOR SWEATING   5    Vitamin B Complex No.12-Niacin 50 mg/15 mL liqd Take  by mouth.        omega-3 fatty acids-fish oil 360-1,200 mg cap 1,000 mg daily.        aspirin delayed-release 81 mg tablet Take 81 mg by mouth daily.        cholecalciferol (VITAMIN D3) 1,000 unit cap 2,000 Units daily.        potassium chloride SR (KLOR-CON 10) 10 mEq tablet Take 10 mEq by mouth every other day.        clonazePAM (KLONOPIN) 1 mg tablet Take 0.5 mg by mouth nightly.        triamterene-hydroCHLOROthiazide (DYAZIDE) 37.5-25 mg per capsule TK 1 C PO QD   6    tadalafil (CIALIS) 5 mg tablet Take 5 mg by mouth as needed.        turmeric root extract 500 mg cap 1 per mouth daily        co-enzyme Q-10 (CO Q-10) 100 mg capsule Take 200 mg by mouth daily.                ROS  Negative except HPI     Physical Exam     Cranial Nerves:   MARIYA, EOM's full, no nystagmus, no ptosis. Facial sensation is normal. Facial movement is symmetric. Hearing is normal bilaterally. Palate is midline with normal sternocleidomastoid and trapezius muscles are normal. Tongue is midline. Motor:  5/5 strength in upper and lower proximal and distal muscles. Normal bulk and tone. No fasciculations. Reflexes:    Not done   Sensory:   Normal to touch. Gait:  Normal gait. Tremor:   No tremor noted. Cerebellar:  No cerebellar signs present. Heart:RRR  Lungs: CTA bilaterally               FILMS: MRI brain 1/20/2021 showed no cerebral edema and decreased size in the distended vein of Kwadwo when compared to the previous MRI in 5/2019. There is also a new signal noted in the C2 vertebral body that was not present on his previous MRI. This is of unknown significance.      Assessment & Plan:  Patient is here for follow up angiogram. I went over the procedure and the risks. Consent was obtained.

## 2021-05-04 NOTE — DISCHARGE INSTRUCTIONS
[address] [facility],[address]  [place] [address]   [phone]           Cerebral Angiography Discharge Instructions    General Information: This test is done to evaluate the blood vessels in your brain and neck. Following the procedure, you will be asked to lie flat on your back for 2-6 hours after the procedure to prevent bleeding complications. The physician may use an arterial closure device that will decrease your recovery time. If this is used, your nurse will explain the difference in recovery. We have no way to determine if we can use a closure device until the procedure is started. We will let you know when you wake up after the procedure. Home Care Instructions: You can resume your regular diet. Do not shower, bathe, swim, drink alcohol, or make any important legal decisions in the next 48 hours. Remove the dressing in the morning. You may drive after 48 hours. Do not lift anything heavier than a gallon of milk for 2 days. Watch the site carefully for signs of infection, like fever, drainage, redness, and/or swelling. If you take Glucophage (Metformin) for diabetes, do not take it for the next 48 hours. If you were asked to hold any blood thinners prior to the procedure, you may restart that medicine the day after the procedure is completed or when instructed by your physician. Recline your car seat for the ride home. Call If: You should call your Physician and/or the Radiology Nurse if you have any signs of infection like fever, drainage, redness, and/or swelling. If the puncture site should ooze, please call. Also call if you have any pain, decreased sensation, numbness, tingling, swelling, or change in color to the affected extremity. SEEK IMMEDIATE MEDICAL CARE IF YOUR PUNCTURE SITE STARTS TO BLEED. APPLY ENOUGH FIRM PRESSURE TO THE SITE WITH THE TIPS OF YOUR FINGERS TO STOP THE BLEEDING.   Arterial bleeding is a medical emergency and should be evaluated immediately. Follow-Up Instructions:  Please follow up with your ordering doctor as he/she has requested. To Reach Us: If you have any questions about your procedure, please call Patric Dao NP at [phone] or Dr. Janet Ziegler office at [phone]    Interventional Radiology General Nurse Discharge    After general anesthesia or intravenous sedation, for 24 hours or while taking prescription Narcotics:  · Limit your activities  · Do not drive and operate hazardous machinery  · Do not make important personal or business decisions  · Do  not drink alcoholic beverages  · If you have not urinated within 8 hours after discharge, please contact your surgeon on call. * Please give a list of your current medications to your Primary Care Provider. * Please update this list whenever your medications are discontinued, doses are     changed, or new medications (including over-the-counter products) are added. * Please carry medication information at all times in case of emergency situations. These are general instructions for a healthy lifestyle:    No smoking/ No tobacco products/ Avoid exposure to second hand smoke  Surgeon General's Warning:  Quitting smoking now greatly reduces serious risk to your health. Obesity, smoking, and sedentary lifestyle greatly increases your risk for illness  A healthy diet, regular physical exercise & weight monitoring are important for maintaining a healthy lifestyle    You may be retaining fluid if you have a history of heart failure or if you experience any of the following symptoms:  Weight gain of 3 pounds or more overnight or 5 pounds in a week, increased swelling in our hands or feet or shortness of breath while lying flat in bed. Please call your doctor as soon as you notice any of these symptoms; do not wait until your next office visit.     Recognize signs and symptoms of STROKE:  F-face looks uneven    A-arms unable to move or move unevenly    S-speech slurred or non-existent    T-time-call 911 as soon as signs and symptoms begin-DO NOT go       Back to bed or wait to see if you get better-TIME IS BRAIN.

## 2021-06-09 NOTE — H&P
EXERCISE SESSION DETAIL History of Present Illness  Patient presents for follow up s/p angiogram July 23, 2019. He had no issues following his procedure. His right groin has healed well.          Past Medical History:   Diagnosis Date    ADD (attention deficit disorder)       controlled with Adderall    Anemia, unspecified      Aneurysm of unspecified site [facility])       Patient denies    Anxiety      Arthritis       hands, back    Attention deficit disorder with hyperactivity(314.01)      Benign paroxysmal vertigo      BPH (benign prostatic hypertrophy)      CAD (coronary artery disease)       2 stents placed 09/14/17; denes MI    Carpal tunnel syndrome of right wrist 4/1/2019    Chronic prostatitis 11/15/2013    Colitis, enteritis, and gastroenteritis of presumed infectious origin      Depressive disorder, not elsewhere classified       Patient denies depression but has anxiety    Disorders of bursae and tendons in shoulder region, unspecified      Fatty liver      Former smoker      GERD (gastroesophageal reflux disease)       controlled w/med    History of tetanus, diphtheria, and acellular pertussis booster vaccination (Tdap)       done 2010    Hypercholesteremia      Hypertrophy of prostate with urinary obstruction and other lower urinary tract symptoms (LUTS) 11/15/2013    Impotence of organic origin      Inguinal hernia 2014     bilat    Insomnia, unspecified      Medial epicondylitis of elbow      Nonspecific elevation of levels of transaminase or lactic acid dehydrogenase (LDH)      Orthostatic hypotension      Osteoporosis 11/15/2013    Other and unspecified hyperlipidemia      Pain of right thumb 4/1/2019    Sleep apnea       No CPAP     Tremor       from Adderall, Inderal prn    Type II or unspecified type diabetes mellitus without mention of complication, not stated as uncontrolled       per pt.  \"pre-diabetic\"; no treatment; last A1C 6.2    Vertigo                          Allergies   Allergen Reactions    Other Medication Other (comments)       Allergy to vinegar \"my nose runs\" and allergy to yeast \"my nose gets stuffy;' red wine-runny nose       Red Wine Extract Other (comments)       Allergy to vinegar \"my nose runs\" and allergy to yeast \"my nose gets stuffy;' red wine-runny nose               Family History   Problem Relation Age of Onset    Glaucoma Mother      Hypertension Mother      Stroke Mother      Stroke Brother      Heart Disease Brother      Coronary Artery Disease Brother      Heart Disease Father      Cancer Father           esophageal    Coronary Artery Disease Father           Social History            Socioeconomic History    Marital status:        Spouse name: Not on file    Number of children: Not on file    Years of education: Not on file    Highest education level: Not on file   Occupational History    Not on file   Social Needs    Financial resource strain: Not on file    Food insecurity:       Worry: Not on file       Inability: Not on file    Transportation needs:       Medical: Not on file       Non-medical: Not on file   Tobacco Use    Smoking status: Former Smoker       Packs/day: 1.00       Years: 10.00       Pack years: 10.00       Last attempt to quit: 1977       Years since quittin.4    Smokeless tobacco: Never Used   Substance and Sexual Activity    Alcohol use:  Yes       Alcohol/week: 5.8 standard drinks       Types: 7 Glasses of wine per week       Comment: occass, 3 drinks per week    Drug use: No    Sexual activity: Not Currently   Lifestyle    Physical activity:       Days per week: Not on file       Minutes per session: Not on file    Stress: Not on file   Relationships    Social connections:       Talks on phone: Not on file       Gets together: Not on file       Attends [employer] service: Not on file       Active member of club or organization: Not on file       Attends meetings of clubs or organizations: Not on file       Relationship status: Not on file    Intimate partner violence:       Fear of current or ex partner: Not on file       Emotionally abused: Not on file       Physically abused: Not on file       Forced sexual activity: Not on file   Other Topics Concern    Not on file   Social History Narrative    Not on file                Current Outpatient Medications   Medication Sig Dispense Refill    DRYSOL 20 % external solution JOSEFA AA AS DIRECTED FOR SWEATING   5    Vitamin B Complex No.12-Niacin 50 mg/15 mL liqd Take  by mouth.        therapeutic multivitamin (THERAGRAN) tablet Take 1 Tab by mouth.        omega-3 fatty acids-fish oil 360-1,200 mg cap 2 per mouth daily        aspirin delayed-release 81 mg tablet Take 81 mg by mouth.        cholecalciferol (VITAMIN D3) 1,000 unit cap 2,000 Units.        linaCLOtide (LINZESS) 72 mcg cap capsule Take 72 mcg by mouth.        naproxen sodium (ALEVE) 220 mg tablet Take 440 mg by mouth every six (6) hours as needed.        potassium chloride SR (KLOR-CON 10) 10 mEq tablet Take 10 mEq by mouth every other day.        tamsulosin (FLOMAX) 0.4 mg capsule Take 0.4 mg by mouth nightly.        clonazePAM (KLONOPIN) 1 mg tablet Take 0.25 mg by mouth nightly.        omeprazole (PRILOSEC) 20 mg capsule Take 20 mg by mouth daily.        triamterene-hydroCHLOROthiazide (DYAZIDE) 37.5-25 mg per capsule TK 1 C PO QD   6    tadalafil (CIALIS) 5 mg tablet Take 5 mg by mouth as needed.        nitroglycerin (NITROSTAT) 0.4 mg SL tablet 0.4 mg by SubLINGual route every five (5) minutes as needed for Chest Pain. Indications: Angina        atorvastatin (LIPITOR) 80 mg tablet Take 80 mg by mouth nightly.  Indications: hypercholesterolemia        multivitamin (ONE A DAY) tablet Take 1 Tab by mouth daily.        turmeric root extract 500 mg cap 1 per mouth daily        magnesium gluconate 500 mg (27 mg  elemental) tablet Take 500 mg by mouth every other day.        co-enzyme Q-10 (CO Q-10) 100 mg capsule Take 200 mg by mouth daily.        magnesium oxide (MAG-OX) 400 mg tablet Take 400 mg by mouth.        atorvastatin (LIPITOR) 80 mg tablet Take 80 mg by mouth.        omeprazole (PRILOSEC) 20 mg capsule Take 20 mg by mouth.        potassium chloride (KLOR-CON) 10 mEq tablet TK 1 T PO  EVERY DAY   0    raNITIdine (ZANTAC 75) 75 mg tab Take  by mouth two (2) times a day.        LINZESS 72 mcg cap 72 mcg daily as needed.   3    omega-3 fatty acids-vitamin e (FISH OIL) 1,000 mg cap Take 1 Cap by mouth daily.        Cholecalciferol, Vitamin D3, (VITAMIN D) 1,000 unit Cap Take 2,000 Units by mouth daily.        ASPIRIN 81 mg Tab Take 81 mg by mouth nightly.                ROS  Negative except HPI     Physical Exam   Cardiovascular: Normal rate and regular rhythm. Pulmonary/Chest: Breath sounds normal.         Cranial Nerves:   MARIYA, EOM's full, no nystagmus, no ptosis. Facial sensation is normal. Facial movement is symmetric. Hearing is normal bilaterally. Palate is midline with normal sternocleidomastoid and trapezius muscles are normal. Tongue is midline. Motor:  5/5 strength in upper and lower proximal and distal muscles. Normal bulk and tone. No fasciculations. Reflexes:    Not done   Sensory:   Normal to touch. Gait:  Normal gait. Tremor:   No tremor noted. Cerebellar:  No cerebellar signs present.               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     Assessment & Plan:   I had a good discussion with the patient about the angiogram results. I discussed the fistula and what a fistula is and explained that the treatment for the fistula is glue embolization of the connection of the fistula where the artery and vein meets.  I gave them information on fistulas and went over the benefits, alternatives, and risks of treatment including injury to the femoral artery, injury to the kidney from dye, groin infection, loss of hair or skin irritation, and 5% chance of neurological injury. Consent for the procedure was obtained. He has been scheduled for Friday Aug 9th for his procedure. All of their questions were answered. They were in agreement with this plan. No significant change since I saw her.

## 2021-09-24 PROBLEM — R07.9 CHEST PAIN: Status: ACTIVE | Noted: 2017-09-11

## 2021-09-24 PROBLEM — E11.59 DIABETES MELLITUS WITH CIRCULATORY DISORDER CAUSING ERECTILE DYSFUNCTION (HCC): Status: ACTIVE | Noted: 2020-09-04

## 2021-09-24 PROBLEM — N52.1 DIABETES MELLITUS WITH CIRCULATORY DISORDER CAUSING ERECTILE DYSFUNCTION (HCC): Status: ACTIVE | Noted: 2020-09-04

## 2021-09-24 PROBLEM — F98.8 ADD (ATTENTION DEFICIT DISORDER): Status: ACTIVE | Noted: 2020-03-11

## 2021-09-24 PROBLEM — M19.90 ARTHRITIS: Status: ACTIVE | Noted: 2020-03-11

## 2021-09-24 PROBLEM — K21.9 GERD (GASTROESOPHAGEAL REFLUX DISEASE): Status: ACTIVE | Noted: 2020-03-11

## 2021-09-24 PROBLEM — E78.00 HYPERCHOLESTEROLEMIA: Status: ACTIVE | Noted: 2017-01-17

## 2021-09-24 PROBLEM — R39.12 WEAK URINARY STREAM: Status: ACTIVE | Noted: 2018-03-26

## 2021-09-24 PROBLEM — G47.33 OSA (OBSTRUCTIVE SLEEP APNEA): Status: ACTIVE | Noted: 2017-08-25

## 2021-09-24 PROBLEM — R35.1 NOCTURIA: Status: ACTIVE | Noted: 2018-03-26

## 2021-09-24 PROBLEM — F52.32 ANORGASMIA OF MALE: Status: ACTIVE | Noted: 2018-03-26

## 2021-09-24 PROBLEM — I77.0 ARTERIOVENOUS FISTULA (HCC): Status: ACTIVE | Noted: 2021-01-25

## 2021-09-24 PROBLEM — Z12.5 PROSTATE CANCER SCREENING: Status: ACTIVE | Noted: 2017-01-17

## 2021-09-24 PROBLEM — R68.82 LOW LIBIDO: Status: ACTIVE | Noted: 2018-03-26

## 2021-10-05 ENCOUNTER — HOSPITAL ENCOUNTER (OUTPATIENT)
Dept: PHYSICAL THERAPY | Age: 76
Discharge: HOME | End: 2021-10-05
Payer: MEDICARE

## 2021-10-05 DIAGNOSIS — M25.512 LEFT SHOULDER PAIN, UNSPECIFIED CHRONICITY: ICD-10-CM

## 2021-10-05 PROCEDURE — 97140 MANUAL THERAPY 1/> REGIONS: CPT

## 2021-10-05 PROCEDURE — 97161 PT EVAL LOW COMPLEX 20 MIN: CPT

## 2021-10-05 PROCEDURE — 97110 THERAPEUTIC EXERCISES: CPT

## 2021-10-05 NOTE — THERAPY EVALUATION
Paty Herrera. : 1945 [address], [address] [place] [address]  [phone]   [phone]        OUTPATIENT PHYSICAL THERAPY:Initial Assessment 10/5/2021         ICD-10: Treatment Diagnosis: Pain in left arm (M79.602), Pain in left shoulder (M25.512) and Stiffness of left shoulder, not elsewhere classified (M25.612)  Precautions/Allergies: Other medication and Red wine extract   Fall Risk Score:     Ambulatory/Rehab Services H2 Model Falls Risk Assessment    Risk Factors:       (1)  Gender [Male]       (1)  Visual Impairment [specify:  Eyeglasses] Ability to Rise from Chair:       (0)  Ability to rise in a single movement    Falls Prevention Plan:       No modifications necessary   Total: (5 or greater = High Risk): 2    © [facility] of [employer]. All Rights Reserved. [employer] Patent #[id number]. Federal Law prohibits the replication, distribution or use without written permission from [employer]     MD Orders: Evaluate and Treat MEDICAL/REFERRING DIAGNOSIS:  Left shoulder pain, unspecified chronicity [M25.512]   DATE OF ONSET: 2021  REFERRING PHYSICIAN: Marcus Wilkerson MD  RETURN PHYSICIAN APPOINTMENT: 10/9/2021     INITIAL ASSESSMENT:  Mr. Ace Davidson presents with pain limited left shoulder range of motion that hinders his ability to reach overhead, behind head and behind back and incomplete left shoulder strengths. He is left arm dominant. These impairments hinder his ability to complete normal activities of daily living and personal care - he requires skilled PT intervention to restore more normal shoulder function and ADL independence   PROBLEM LIST (Impacting functional limitations):  1. Decreased Strength  2. Decreased ADL/Functional Activities  3. Increased Pain  4. Decreased Flexibility/Joint Mobility  5. Decreased [place] with Home Exercise Program INTERVENTIONS PLANNED:  1. Cryotherapy  2.  Electrical Stimulation  3. Home Exercise Program (HEP)  4. Manual Therapy  5. Range of Motion (ROM)  6. Therapeutic Activites  7. Therapeutic Exercise/Strengthening    TREATMENT PLAN:  Effective Dates: 10/5/2021 TO 12/4/2021 (60 days). Frequency/Duration: 2 times a week for 60 Days  GOALS: (Goals have been discussed and agreed upon with patient.)  Short-Term Functional Goals: Time Frame: 3 weeks  1. Independent performance of home exercise program  2. Improve left shoulder overhead and cross body reach ranges of motion x 25%, without pain limitations. Discharge Goals: Time Frame: 6 weeks  1. Increase left shoulder active overhead reach to 135 deg without pain  2. Pt to demonstrate increased behind-back reaching with left arm to mid-thoracic levels to allow normal personal care/dressing  3. Pt to demonstrate abitliiy to lift overhead 5# or better  X 10+ reps for reaching into cabinets. Rehabilitation Potential For Stated Goals: Good  Regarding Iliana Chandpolina Be's therapy, I certify that the treatment plan above will be carried out by a therapist or under their direction. Thank you for this referral,  Carlitos Santo, PT       Referring Physician Signature: Latia Roman MD              Date                      HISTORY:   History of Present Injury/Illness (Reason for Referral):  Pt reports onset of shoulder pain since August, 2021, without specific trauma. This pain has progressed over the last month and now hinders his ability to reach past shoulder level or across body without him experiencing sharp pain that radiates into his upper arm. He notes waking this morning after sleeping on his left shoulder experiencing shoulder pain. He is left arm dominant and now hindered in his ability to complete normal daily activities requiring left arm reaching due to this pain. He denies experiencing left UE numbness/paresthesia as well as any limitations in his cervical ROM /function.    Past Medical History/Comorbidities:    Arely Woo  has a past medical history of ADD (attention deficit disorder), Anemia, unspecified, Aneurysm of unspecified site ([facility] [address] Anxiety, Arthritis, Attention deficit disorder with hyperactivity(314.01), Benign paroxysmal vertigo, BPH (benign prostatic hypertrophy), CAD (coronary artery disease), Carpal tunnel syndrome of right wrist (4/1/2019), Chronic prostatitis (11/15/2013), Colitis, enteritis, and gastroenteritis of presumed infectious origin, Depressive disorder, not elsewhere classified, Disorders of bursae and tendons in shoulder region, unspecified, Fatty liver, Former smoker, GERD (gastroesophageal reflux disease), History of tetanus, diphtheria, and acellular pertussis booster vaccination (Tdap), Hypercholesteremia, Hypertrophy of prostate with urinary obstruction and other lower urinary tract symptoms (LUTS) (11/15/2013), Impotence of organic origin, Inguinal hernia (2014), Insomnia, unspecified, Medial epicondylitis of elbow, Nonspecific elevation of levels of transaminase or lactic acid dehydrogenase (LDH), Orthostatic hypotension, Osteoporosis (11/15/2013), Other and unspecified hyperlipidemia, Pain of right thumb (4/1/2019), Sleep apnea, Tremor, Type II or unspecified type diabetes mellitus without mention of complication, not stated as uncontrolled, and Vertigo. Mr. Arely Woo  has a past surgical history that includes hx mohs procedure (Right); hx turp (7999,[id number]); hx lumbar laminectomy (2/2012); pr cystoscopy,insert ureteral stent; hx hernia repair (Bilateral, 2/3/14); hx ptca (09/14/2017); hx heart catheterization (2017); ir emboli intracran lt (8/9/2019); hx vasectomy; hx urological; hx urological; hx orthopaedic (2010); hx knee arthroscopy (Right, 2000); hx shoulder arthroscopy (Right, 1995); hx heent; hx tonsillectomy; and ir emboli intracran lt (3/9/2020). Social History/Living Environment:     Pt lives with spouse, does not have stairs at home.    Prior Level of Function/Work/Activity:  Pt previously independent in ADL function. Regular fitness training at fitness center has been curtailed due to pandemic. Dominant Side:         LEFT  Current Medications:    Current Outpatient Medications:     ZINC PO, Take  by mouth., Disp: , Rfl:     POTASSIUM PO, Take  by mouth., Disp: , Rfl:     MELATONIN PO, Take  by mouth., Disp: , Rfl:     acetaminophen (TYLENOL) 500 mg tablet, Take  by mouth., Disp: , Rfl:     loperamide HCl (IMODIUM PO), Take  by mouth., Disp: , Rfl:     ASHWAGANDHA ROOT EXTRACT PO, Take  by mouth., Disp: , Rfl:     hyoscyamine (ANASPAZ, LEVSIN) 0.125 mg tablet, Take 0.125 mg by mouth., Disp: , Rfl:     dextroamphetamine-amphetamine (ADDERALL) 20 mg tablet, Take 20 mg by mouth two (2) times a day. (Patient not taking: Reported on 9/28/2021), Disp: , Rfl:     metoprolol tartrate (LOPRESSOR) 25 mg tablet, Take 25 mg by mouth two (2) times a day. (Patient not taking: Reported on 9/28/2021), Disp: , Rfl:     dextroamphetamine-amphetamine (ADDERALL) 5 mg tablet, Take 5 mg by mouth daily. (Patient not taking: Reported on 9/28/2021), Disp: , Rfl:     famotidine (PEPCID) 40 mg tablet, Take 40 mg by mouth two (2) times a day., Disp: , Rfl:     MILK THISTLE PO, Take  by mouth., Disp: , Rfl:     ascorbic acid, vitamin C, (VITAMIN C) 500 mg tablet, Take 1,000 mg by mouth daily. , Disp: , Rfl:     metoprolol tartrate (LOPRESSOR) 25 mg tablet, Take 0.5 Tabs by mouth two (2) times a day. Indications: high blood pressure (Patient not taking: Reported on 9/28/2021), Disp: 30 Tab, Rfl: 5    atorvastatin (LIPITOR) 80 mg tablet, Take 1 Tab by mouth nightly. Indications: high cholesterol, Disp: 90 Tab, Rfl: 3    multivitamin (ONE A DAY) tablet, Take 1 Tab by mouth daily. , Disp: , Rfl:     DRYSOL 20 % external solution, JOSEFA AA AS DIRECTED FOR SWEATING (Patient not taking: Reported on 9/28/2021), Disp: , Rfl: 5    Vitamin B Complex No.12-Niacin 50 mg/15 mL liqd, Take  by mouth.  (Patient not taking: Reported on 9/28/2021), Disp: , Rfl:     omega-3 fatty acids-fish oil 360-1,200 mg cap, 1,000 mg daily. , Disp: , Rfl:     aspirin delayed-release 81 mg tablet, Take 81 mg by mouth daily. , Disp: , Rfl:     cholecalciferol (VITAMIN D3) 1,000 unit cap, 2,000 Units daily. , Disp: , Rfl:     clonazePAM (KLONOPIN) 1 mg tablet, Take 0.5 mg by mouth nightly., Disp: , Rfl:     triamterene-hydroCHLOROthiazide (DYAZIDE) 37.5-25 mg per capsule, TK 1 C PO QD, Disp: , Rfl: 6    tadalafil (CIALIS) 5 mg tablet, Take 5 mg by mouth as needed. , Disp: , Rfl:     turmeric root extract 500 mg cap, 1 per mouth daily, Disp: , Rfl:     co-enzyme Q-10 (CO Q-10) 100 mg capsule, Take 200 mg by mouth daily. , Disp: , Rfl:    Date Last Reviewed:  10/5/2021   # of Personal Factors/Comorbidities that affect the Plan of Care: 0: LOW COMPLEXITY   EXAMINATION:   Observation/Orthostatic Postural Assessment: Forward head posture. Pt demonstrate apprehension with active left shoulder elevation above shoulder level and with reaching across body with left arm. Palpation:    Left humeral head point-tenderness  ROM:    Shoulder: AROM's: flexion = 125 deg left, 132 deg right. Abduction = 127 deg left (pain limited), 142 deg right. External rotation: 62 deg left, 50 deg right. Apley ER with reach behind head: C7 level left, T2 level right. Internal rotation with reach behind back: left = L3 (pain limited), right = T6. Shoulder L PROM's: flexion = 145 deg (pain), ER = 72 deg, IR = 78 deg. Strength:   Hand held dynamometry: Shoulder flexion: left = 22#, right = 33#. External rotation: left = 17.2#, right = 15.1#. Internal rotation: Left = 17.1# (pain), right = 26.3#. Special Tests:    + left shoulder Serrano-Al test.   Neurological Screen:  Symmetrical biceps, triceps and brachioradialis DTR's  Functional Mobility:   Normal/independent performance of supine-sit-stand transfers, walks without assistive device.    Balance:    Intact static standing balance   Body Structures Involved:  1. Nerves  2. Bones  3. Joints  4. Muscles  5. Ligaments Body Functions Affected:  1. Sensory/Pain  2. Neuromusculoskeletal  3. Movement Related Activities and Participation Affected:  1. General Tasks and Demands  2. Mobility  3. Self Care  4. Domestic Life  5. Interpersonal Interactions and Relationships  6. Community, Social and [place]   # of elements that affect the Plan of Care: 1-2: LOW COMPLEXITY   CLINICAL PRESENTATION:   Presentation: Stable and uncomplicated: LOW COMPLEXITY   CLINICAL DECISION MAKING:   Tool Used: Disabilities of the Arm, Shoulder and Hand (DASH) Questionnaire - Quick Version  Score:  Initial: 29/55  Most Recent: X/55 (Date: -- )   Interpretation of Score: The DASH is designed to measure the activities of daily living in person's with upper extremity dysfunction or pain. Each section is scored on a 1-5 scale, 5 representing the greatest disability. The scores of each section are added together for a total score of 55. Medical Necessity:   · Patient is expected to demonstrate progress in strength, range of motion, balance and coordination  ·  to increase independence with Normal personal care requiring left UE use and increasing left shoulder mobility, strength and function to pre-injury levels. · .  Reason for Services/Other Comments:  · Patient has demonstrated an improvement in functional level by independent performance of HEP.    · .   Use of outcome tool(s) and clinical judgement create a POC that gives a: Clear prediction of patient's progress: LOW COMPLEXITY     Total Treatment Duration: 51 minutes  PT Patient Time In/Time Out  Time In: 1104  Time Out: [address], PT

## 2021-10-05 NOTE — PROGRESS NOTES
Ariane Landrum. : 1945  [place]  Secondary: [place] [employer] [id number]  [address] @ [place] [address], [address]  [phone]   [phone]      OUTPATIENT PHYSICAL THERAPY: Daily Treatment Note  10/5/2021   ICD-10: Treatment Diagnosis: Pain in left arm (M79.602), Pain in left shoulder (M25.512) and Stiffness of left shoulder, not elsewhere classified (M25.612)  Left shoulder pain, unspecified chronicity [M25.512]   TREATMENT PLAN:  Effective Dates: 10/5/2021 TO 2021 (60 days). Frequency/Duration: 2 times a week for 60 Days  GOALS: (Goals have been discussed and agreed upon with patient.)  Short-Term Functional Goals: Time Frame: 3 weeks  1. Independent performance of home exercise program  2. Improve left shoulder overhead and cross body reach ranges of motion x 25%, without pain limitations. Discharge Goals: Time Frame: 6 weeks  1. Increase left shoulder active overhead reach to 135 deg without pain  2. Pt to demonstrate increased behind-back reaching with left arm to mid-thoracic levels to allow normal personal care/dressing  3. Pt to demonstrate abitliiy to lift overhead 5# or better  X 10+ reps for reaching into cabinets. Rehabilitation Potential For Stated Goals: Good  _________________________________________________________________________  Pre-treatment Symptoms/Complaints:  See today's initial evaluation report  Pain: Initial: 8/10 Post Session:  No increase/10   Medications Last Reviewed:  10/5/2021  Updated Objective Findings:  Below measures from initial evaluation unless otherwise noted. TREATMENT:   THERAPEUTIC ACTIVITY: ( see below for minutes): Therapeutic activities per grid below to improve mobility, strength, balance and coordination. Required minimal visual, verbal, manual and tactile cues to improve independence and safety with daily activities .   THERAPEUTIC EXERCISE: (see below for minutes):  Exercises per grid below to improve mobility, strength, balance and coordination. Required minimal verbal and manual cues to promote proper body alignment, promote proper body posture and promote proper body mechanics. Progressed resistance, range, repetitions and complexity of movement as indicated. MANUAL THERAPY: (see below for minutes): Joint mobilization and Soft tissue mobilization was utilized and necessary because of the patient's restricted joint motion, painful spasm, loss of articular motion and restricted motion of soft tissue. MODALITIES: (see below for minutes):      to decrease pain    SELF CARE: (see below for minutes): Procedure(s) (per grid) utilized to improve and/or restore self-care/home management as related to dressing, bathing and grooming. Required minimal verbal cueing to facilitate activities of daily living skills and compensatory activities. Date: 10/5/2021 (initial evaluation)       Modalities:                                Therapeutic Exercise: 16min        L shoulder wand flexion AAROM supine: 10 x 5\"        Left shoulder ER wand AAROM supine: 10 x 5\"         Left shoulder sidelying ER AROM x 30 reps         Left shoulder sidelying sleeper stretch: 10 x 5\"                        Proprioceptive Activities:                                Manual Therapy: 8 min        Left shoulder joint mobilization: [address] long axis distraction, ap glides, w/ PROM               Therapeutic Activities:                                  HEP: Perform the following 2x/day:  1. Supine wand flexion AAROM : 10 x 5\"   2. Supine left shoulder wand ER: 10 x 5\"   3. Sidelying shoulder ER 3 x 10 L   4.  Sleeper stretch L 10 x 5\"     [facility] Portal  Treatment/Session Summary:    · Response to Treatment:  Patient is independent with performance of above described home program.  · Communication/Consultation:  None today  · Equipment provided today:  None today  · Recommendations/Intent for next treatment session: Next visit will focus on Increasing left shoulder AROM to full/painfree levels, beginning rotator cuff strength /endurance training. HEP revision, Modalities as needed to modulate pain.  .    Total Treatment Billable Duration:  51 minute  PT Patient Time In/Time Out  Time In: 1104  Time Out: Lena Guerrero [address], PT    Future Appointments   Date Time Provider Monique Gonzalez   10/7/2021 11:00 AM Shlomo Amas, PT [facility]   10/11/2021 11:45 AM Denise Landau, Quint[facility] Salina[facility]   10/14/2021 10:15 AM [facility]as[facility]   10/19/2021 10:15 AM [facility] Amas, [facility]   10/21/2021 10:15 AM Shlomo Amas, [facility]   10/27/2021 11:45 AM Shlomo Amas, PT [facility]   10/29/2021 11:45 AM [facility]lomo Amas, PT [facility]   11/1/2021  3:50 PM Nalini Israel MD [facility]   11/9/2021 10:00 AM Emre Sigala MD [facility]

## 2021-10-07 ENCOUNTER — HOSPITAL ENCOUNTER (OUTPATIENT)
Dept: PHYSICAL THERAPY | Age: 76
Discharge: HOME | End: 2021-10-07
Payer: MEDICARE

## 2021-10-07 PROCEDURE — 97110 THERAPEUTIC EXERCISES: CPT

## 2021-10-07 PROCEDURE — 97140 MANUAL THERAPY 1/> REGIONS: CPT

## 2021-10-07 PROCEDURE — 97014 ELECTRIC STIMULATION THERAPY: CPT

## 2021-10-07 NOTE — PROGRESS NOTES
Deon Jeans. : 1945  [place]  Secondary: [place] [employer] [id number]  [address] @ [address]  [phone]   [phone]      OUTPATIENT PHYSICAL THERAPY: Daily Treatment Note  10/7/2021   ICD-10: Treatment Diagnosis: Pain in left arm (M79.602), Pain in left shoulder (M25.512) and Stiffness of left shoulder, not elsewhere classified (M25.612)  Left shoulder pain, unspecified chronicity [M25.512]   TREATMENT PLAN:  Effective Dates: 10/5/2021 TO 2021 (60 days). Frequency/Duration: 2 times a week for 60 Days  GOALS: (Goals have been discussed and agreed upon with patient.)  Short-Term Functional Goals: Time Frame: 3 weeks  1. Independent performance of home exercise program  2. Improve left shoulder overhead and cross body reach ranges of motion x 25%, without pain limitations. Discharge Goals: Time Frame: 6 weeks  1. Increase left shoulder active overhead reach to 135 deg without pain  2. Pt to demonstrate increased behind-back reaching with left arm to mid-thoracic levels to allow normal personal care/dressing  3. Pt to demonstrate abitliiy to lift overhead 5# or better  X 10+ reps for reaching into cabinets. Rehabilitation Potential For Stated Goals: Good  _________________________________________________________________________  Pre-treatment Symptoms/Complaints:  No undue soreness following last visit - is performing HEP. Pain: Initial: 5/10 Post Session:  No increase/10   Medications Last Reviewed:  10/7/2021  Updated Objective Findings:  Below measures from initial evaluation unless otherwise noted. TREATMENT:   THERAPEUTIC ACTIVITY: ( see below for minutes): Therapeutic activities per grid below to improve mobility, strength, balance and coordination.   Required minimal visual, verbal, manual and tactile cues to improve independence and safety with daily activities Aga Henson THERAPEUTIC EXERCISE: (see below for minutes):  Exercises per grid below to improve mobility, strength, balance and coordination. Required minimal verbal and manual cues to promote proper body alignment, promote proper body posture and promote proper body mechanics. Progressed resistance, range, repetitions and complexity of movement as indicated. MANUAL THERAPY: (see below for minutes): Joint mobilization and Soft tissue mobilization was utilized and necessary because of the patient's restricted joint motion, painful spasm, loss of articular motion and restricted motion of soft tissue. MODALITIES: (see below for minutes):      to decrease pain    SELF CARE: (see below for minutes): Procedure(s) (per grid) utilized to improve and/or restore self-care/home management as related to dressing, bathing and grooming. Required minimal verbal cueing to facilitate activities of daily living skills and compensatory activities.      Date: 10/5/2021 (initial evaluation) 10/7/2021 (visit 2)      Modalities:  15 min        L shellie ice / IFC estim :  pps                      Therapeutic Exercise: 16min 33  min       L shoulder wand flexion AAROM supine: 10 x 5\" Shoulder wand flexion: 10 x 10\" supine       Left shoulder ER wand AAROM supine: 10 x 5\"  L shellie manl resist ER : 3 x 12 supine       Left shoulder sidelying ER AROM x 30 reps  L shoulder ER sidelying: 10x no wt, 3 x 10 1#       Left shoulder sidelying sleeper stretch: 10 x 5\"  L shellie Abduction arom 3 x 01        L shellie punches supine 3 x 10: 1#        L shellie prone extn: 1# 3 x 10        L shellie horiz abd 3 x 10                      Proprioceptive Activities:                                Manual Therapy: 8 min 8 min       Left shoulder joint mobilization: [address] long axis distraction, ap glides, w/ PROM Left shoulder joint mobilization: [address] long axis distraction, ap glides, w/ PROM, end range IR/ER stretches              Therapeutic Activities: HEP: Perform the following 2x/day:  1. Supine wand flexion AAROM : 10 x 5\"   2. Supine left shoulder wand ER: 10 x 5\"   3. Sidelying shoulder ER 3 x 10 L   4. Sleeper stretch L 10 x 5\"     [facility] Portal  Treatment/Session Summary:    · Response to Treatment:  Better than expected performanc eof exercise program at this visit - should be able to progress into further isotonics at next visit. · Communication/Consultation:  None today  · Equipment provided today:  None today  · Recommendations/Intent for next treatment session: Next visit will focus on Increasing left shoulder AROM to full/painfree levels, beginning rotator cuff strength /endurance training. HEP revision, Modalities as needed to modulate pain.  .    Total Treatment Billable Duration: 60 minute  PT Patient Time In/Time Out  Time In: 2043  Time Out: [facility], PT    Future Appointments   Date Time Provider Monique Gonzalez   10/11/2021  8:45 AM Lissy Rash, PT [facility]   10/14/2021 10:15 AM Lissy Rash, PT [facility]   10/19/2021 10:15 AM Lissy Rash, PT [facility]   10/21/2021 10:15 AM Lissy Rash, PT [facility]   10/27/2021 11:45 AM Lissy Rash, PT [facility]   10/29/2021 11:45 AM Lissy Rash, PT [facility]   11/1/2021  3:50 PM Aditya Belle MD [facility]   11/9/2021 10:00 AM Harrison Rossi MD [facility]

## 2021-10-11 ENCOUNTER — HOSPITAL ENCOUNTER (OUTPATIENT)
Dept: PHYSICAL THERAPY | Age: 76
Discharge: HOME | End: 2021-10-11
Payer: MEDICARE

## 2021-10-11 PROCEDURE — 97140 MANUAL THERAPY 1/> REGIONS: CPT

## 2021-10-11 PROCEDURE — 97014 ELECTRIC STIMULATION THERAPY: CPT

## 2021-10-11 PROCEDURE — 97110 THERAPEUTIC EXERCISES: CPT

## 2021-10-11 NOTE — PROGRESS NOTES
Sherwincatrachita Gonzalez. : 1945  [place]  Secondary: [place] [employer] [id number]  [address] @ [address], [place]  [phone]   [phone]      OUTPATIENT PHYSICAL THERAPY: Daily Treatment Note  10/11/2021   ICD-10: Treatment Diagnosis: Pain in left arm (M79.602), Pain in left shoulder (M25.512) and Stiffness of left shoulder, not elsewhere classified (M25.612)  Left shoulder pain, unspecified chronicity [M25.512]   TREATMENT PLAN:  Effective Dates: 10/5/2021 TO 2021 (60 days). Frequency/Duration: 2 times a week for 60 Days  GOALS: (Goals have been discussed and agreed upon with patient.)  Short-Term Functional Goals: Time Frame: 3 weeks  1. Independent performance of home exercise program  2. Improve left shoulder overhead and cross body reach ranges of motion x 25%, without pain limitations. Discharge Goals: Time Frame: 6 weeks  1. Increase left shoulder active overhead reach to 135 deg without pain  2. Pt to demonstrate increased behind-back reaching with left arm to mid-thoracic levels to allow normal personal care/dressing  3. Pt to demonstrate abitliiy to lift overhead 5# or better  X 10+ reps for reaching into cabinets. Rehabilitation Potential For Stated Goals: Good  _________________________________________________________________________  Pre-treatment Symptoms/Complaints:  Shoulder is sore from weekend activities, mowed lawn. Pain: Initial: 5/10 Post Session:  No increase/10   Medications Last Reviewed:  10/11/2021  Updated Objective Findings:  L shoulder active scaption = 150 deg. TREATMENT:   THERAPEUTIC ACTIVITY: ( see below for minutes): Therapeutic activities per grid below to improve mobility, strength, balance and coordination. Required minimal visual, verbal, manual and tactile cues to improve independence and safety with daily activities .   THERAPEUTIC EXERCISE: (see below for minutes):  Exercises per grid below to improve mobility, strength, balance and coordination. Required minimal verbal and manual cues to promote proper body alignment, promote proper body posture and promote proper body mechanics. Progressed resistance, range, repetitions and complexity of movement as indicated. MANUAL THERAPY: (see below for minutes): Joint mobilization and Soft tissue mobilization was utilized and necessary because of the patient's restricted joint motion, painful spasm, loss of articular motion and restricted motion of soft tissue. MODALITIES: (see below for minutes):      to decrease pain    SELF CARE: (see below for minutes): Procedure(s) (per grid) utilized to improve and/or restore self-care/home management as related to dressing, bathing and grooming. Required minimal verbal cueing to facilitate activities of daily living skills and compensatory activities. Date: 10/5/2021 (initial evaluation) 10/7/2021 (visit 2) 10/11/2021 (visit 3)     Modalities:  15 min 15 mn       L shellie ice / IFC estim :  pps L shellie ice / IFC estim : 15 min,   pps                     Therapeutic Exercise: 16min 33  min 33 min      L shoulder wand flexion AAROM supine: 10 x 5\" Shoulder wand flexion: 10 x 10\" supine UBE: level 2 resistance x 6 minutes      Left shoulder ER wand AAROM supine: 10 x 5\"  L shellie manl resist ER : 3 x 12 supine Shoulder flexion supine AAROM: 10 x 5\" - fingers laced      Left shoulder sidelying ER AROM x 30 reps  L shoulder ER sidelying: 10x no wt, 3 x 10 1# Supine shellie L ER manl resist : 3 x 12      Left shoulder sidelying sleeper stretch: 10 x 5\"  L shellie Abduction arom 3 x 10 Supine shellie punches: 12x manl, 2 x 12 w/2#.         L shellie punches supine 3 x 10: 1# Prone shellie extn [email or web address] 3 x 12       L shellie prone extn: 1# 3 x 10 Prone shellie horiz abd L: 3 x 12@ 1#       L shellie horiz abd 3 x 10 Wall slides L shoulder AAROM x 20        L shoulder mid range acitive alphabet 1 set standing Proprioceptive Activities:                                Manual Therapy: 8 min 8 min 8 min      Left shoulder joint mobilization: [address] long axis distraction, ap glides, w/ PROM Left shoulder joint mobilization: [address] long axis distraction, ap glides, w/ PROM, end range IR/ER stretches L shellie: [address] ap glides, long axis traction w/ end range PROM flex, ER, IR             Therapeutic Activities:                                  HEP: Perform the following 2x/day:  1. Supine wand flexion AAROM : 10 x 5\"   2. Supine left shoulder wand ER: 10 x 5\"   3. Sidelying shoulder ER 3 x 10 L   4. Sleeper stretch L 10 x 5\"     [facility]  Treatment/Session Summary:    · Response to Treatment:  Continuing to improve in shoulder strength/AROM's - ready for progression of HEP at next visit. · Communication/Consultation:  None today  · Equipment provided today:  None today  · Recommendations/Intent for next treatment session: Next visit will focus on Increasing left shoulder AROM to full/painfree levels, Progress rotator cuff strength /endurance training. HEP revision, Modalities as needed to modulate pain.  .    Total Treatment Billable Duration: 60 minute  PT Patient Time In/Time Out  Time In: 0845  Time Out: [address], PT    Future Appointments   Date Time Provider Monique Gonzalez   10/14/2021 10:15 AM Pansy Ganong, PT [facility]   10/19/2021 10:15 AM Pansy Ganong, PT [facility]   10/21/2021 10:15 AM Pansy Ganong, PT [facility]   10/27/2021 11:45 AM Pansy Ganong, PT [facility]   10/29/2021 11:45 AM Pansy Ganong, PT [facility]   11/1/2021  3:50 PM Zen Crooks MD [facility]   11/2/2021 10:15 AM Pansy Ganong, PT [facility]   11/4/2021 10:15 AM Pansy Ganong, PT [facility]   11/8/2021 10:15 AM Pansy Ganong, PT [facility]   11/9/2021 10:00 AM Shaista Huitron MD [facility]   11/11/2021 10:15 AM Minh Jay Pila, PT [facility]   11/16/2021 10:15 AM Shlomo Fuentes, PT [facility]   11/18/2021 10:15 AM [facility]   11/23/2021 10:15 AM [facility]   11/30/2021 10:15 AM Denise Landau, Quinten Salina, PT [facility]

## 2021-10-14 ENCOUNTER — HOSPITAL ENCOUNTER (OUTPATIENT)
Dept: PHYSICAL THERAPY | Age: 76
Discharge: HOME | End: 2021-10-14
Payer: MEDICARE

## 2021-10-14 ENCOUNTER — APPOINTMENT (OUTPATIENT)
Dept: PHYSICAL THERAPY | Age: 76
End: 2021-10-14
Payer: MEDICARE

## 2021-10-14 PROCEDURE — 97110 THERAPEUTIC EXERCISES: CPT

## 2021-10-14 PROCEDURE — 97140 MANUAL THERAPY 1/> REGIONS: CPT

## 2021-10-14 PROCEDURE — 97014 ELECTRIC STIMULATION THERAPY: CPT

## 2021-10-14 NOTE — PROGRESS NOTES
Tariq Washington. : 1945  [place]  Secondary: [place] [employer] [id number] @ [address], [place]  [phone]   [phone]      OUTPATIENT PHYSICAL THERAPY: Daily Treatment Note  10/14/2021   ICD-10: Treatment Diagnosis: Pain in left arm (M79.602), Pain in left shoulder (M25.512) and Stiffness of left shoulder, not elsewhere classified (M25.612)  Left shoulder pain, unspecified chronicity [M25.512]   TREATMENT PLAN:  Effective Dates: 10/5/2021 TO 2021 (60 days). Frequency/Duration: 2 times a week for 60 Days  GOALS: (Goals have been discussed and agreed upon with patient.)  Short-Term Functional Goals: Time Frame: 3 weeks  1. Independent performance of home exercise program  2. Improve left shoulder overhead and cross body reach ranges of motion x 25%, without pain limitations. Discharge Goals: Time Frame: 6 weeks  1. Increase left shoulder active overhead reach to 135 deg without pain  2. Pt to demonstrate increased behind-back reaching with left arm to mid-thoracic levels to allow normal personal care/dressing  3. Pt to demonstrate abitliiy to lift overhead 5# or better  X 10+ reps for reaching into cabinets. Rehabilitation Potential For Stated Goals: Good  _________________________________________________________________________  Pre-treatment Symptoms/Complaints:  No pain issues at start of PT. Notes pain with trying to flex shoulder from side when supine. Pt states that he's been working on home exercises and that his wife has been helping monitor his progress with his shoulder flexion stretching. Pain: Initial: 0/10 Post Session:  No increase/10   Medications Last Reviewed:  10/14/2021  Updated Objective Findings:  L shoulder  supine active assist flexion = 158 deg. TREATMENT:   THERAPEUTIC ACTIVITY: ( see below for minutes):   Therapeutic activities per grid below to improve mobility, strength, balance and coordination. Required minimal visual, verbal, manual and tactile cues to improve independence and safety with daily activities . THERAPEUTIC EXERCISE: (see below for minutes):  Exercises per grid below to improve mobility, strength, balance and coordination. Required minimal verbal and manual cues to promote proper body alignment, promote proper body posture and promote proper body mechanics. Progressed resistance, range, repetitions and complexity of movement as indicated. MANUAL THERAPY: (see below for minutes): Joint mobilization and Soft tissue mobilization was utilized and necessary because of the patient's restricted joint motion, painful spasm, loss of articular motion and restricted motion of soft tissue. MODALITIES: (see below for minutes):      to decrease pain    SELF CARE: (see below for minutes): Procedure(s) (per grid) utilized to improve and/or restore self-care/home management as related to dressing, bathing and grooming. Required minimal verbal cueing to facilitate activities of daily living skills and compensatory activities.      Date: 10/5/2021 (initial evaluation) 10/7/2021 (visit 2) 10/11/2021 (visit 3) 10/14/2021 (visit 4)    Modalities:  15 min 15 mn 15 min      L shellie ice / IFC estim :  pps L shellie ice / IFC estim : 15 min,   pps L shellie ice / IFC estim : 15 min,   pps                    Therapeutic Exercise: 16min 33  min 33 min 30 min     L shoulder wand flexion AAROM supine: 10 x 5\" Shoulder wand flexion: 10 x 10\" supine UBE: level 2 resistance x 6 minutes UBE: level 2 resistance x 6 minute     Left shoulder ER wand AAROM supine: 10 x 5\"  L shellie manl resist ER : 3 x 12 supine Shoulder flexion supine AAROM: 10 x 5\" - fingers laced    Shoulder wand flexion supine 10 x 10\"     Left shoulder sidelying ER AROM x 30 reps  L shoulder ER sidelying: 10x no wt, 3 x 10 1# Supine shellie L ER manl resist : 3 x 12 Supine L shellie manl resist ER 3 x 12 Left shoulder sidelying sleeper stretch: 10 x 5\"  L shellie Abduction arom 3 x 10 Supine shellie punches: 12x manl, 2 x 12 w/2#. Supine punches: 3 x 12, 2#.       L shellie punches supine 3 x 10: 1# Prone shellie extn [email or web address] 3 x 12 Sidelying L shellie ER: 3 x12 @ 1#      L shellie prone extn: 1# 3 x 10 Prone shellie horiz abd L: 3 x 12@ 1# Prone shoulder extn: 2# 3 x 10      L shellie horiz abd 3 x 10 Wall slides L shoulder AAROM x 20 Prone shellie horiz abd L: 3 x 10 @ 1#       L shoulder mid range acitive alphabet 1 set standing L shellie mid range flexion active alphabet - 1 set - standing            Proprioceptive Activities:                                Manual Therapy: 8 min 8 min 8 min 12 min     Left shoulder joint mobilization: [facility] long axis distraction, ap glides, w/ PROM Left shoulder joint mobilization: [facility] long axis distraction, ap glides, w/ PROM, end range IR/ER stretches L shellie: GH ap glides, long axis traction w/ end range PROM flex, ER, IR L shellie: GH ap glides, long axis traction w/ end range PROM flex, ER, IR            Therapeutic Activities:                                  HEP: Perform the following 2x/day:  1. Supine wand flexion AAROM : 10 x 5\"   2. Supine left shoulder wand ER: 10 x 5\"   3. Sidelying shoulder ER 3 x 10 L   4. Sleeper stretch L 10 x 5\"     [facility] Portal  Treatment/Session Summary:    · Response to Treatment:  Left shoulder strengths improving,  particularly L shellie ER (4/5). · Communication/Consultation:  None today  · Equipment provided today:  None today  · Recommendations/Intent for next treatment session: Next visit will focus on Increasing left shoulder AROM to full/painfree levels, Progress rotator cuff strength /endurance training. HEP revision ( start tubing program), Modalities as needed to modulate pain.  .    Total Treatment Billable Duration: 60 minute  PT Patient Time In/Time Out  Time In: 1016  Time Out: [address], PT    Future Appointments   Date Time Provider Monique Gonzalez 10/19/2021 10:15 AM [facility]   10/21/2021 10:15 AM [facility]   10/27/2021 11:45 AM [facility] Blum[facility]   10/29/2021 11:45 AM [facility]   11/1/2021  3:50 PM Frederick Amos MD [facility]   11/2/2021 10:15 AM Chris Blum, [facility]   11/4/2021 10:15 AM Chris Blum, PT [facility]   11/8/2021 10:15 AM Chris Blum, PT [facility]   11/9/2021 10:00 AM Rivera Plata MD [facility]   11/11/2021 10:15 AM [facility]   11/16/2021 10:15 AM [facility]um[facility]   11/18/2021 10:15 AM [facility] Blum, [facility]   11/23/2021 10:15 AM Chris Blum, PT [facility]   11/30/2021 10:15 AM Jennifer Pittman, PT [facility]

## 2021-10-19 ENCOUNTER — HOSPITAL ENCOUNTER (OUTPATIENT)
Dept: PHYSICAL THERAPY | Age: 76
Discharge: HOME | End: 2021-10-19
Payer: MEDICARE

## 2021-10-19 PROCEDURE — 97014 ELECTRIC STIMULATION THERAPY: CPT

## 2021-10-19 PROCEDURE — 97140 MANUAL THERAPY 1/> REGIONS: CPT

## 2021-10-19 PROCEDURE — 97110 THERAPEUTIC EXERCISES: CPT

## 2021-10-19 NOTE — PROGRESS NOTES
[facility]. : 1945  [place]  Secondary: [place] [employer] [id number] @ [address], [place]  [phone]   [phone]      OUTPATIENT PHYSICAL THERAPY: Daily Treatment Note  10/19/2021   ICD-10: Treatment Diagnosis: Pain in left arm (M79.602), Pain in left shoulder (M25.512) and Stiffness of left shoulder, not elsewhere classified (M25.612)  Left shoulder pain, unspecified chronicity [M25.512]   TREATMENT PLAN:  Effective Dates: 10/5/2021 TO 2021 (60 days). Frequency/Duration: 2 times a week for 60 Days  GOALS: (Goals have been discussed and agreed upon with patient.)  Short-Term Functional Goals: Time Frame: 3 weeks  1. Independent performance of home exercise program  2. Improve left shoulder overhead and cross body reach ranges of motion x 25%, without pain limitations. Discharge Goals: Time Frame: 6 weeks  1. Increase left shoulder active overhead reach to 135 deg without pain  2. Pt to demonstrate increased behind-back reaching with left arm to mid-thoracic levels to allow normal personal care/dressing  3. Pt to demonstrate abitliiy to lift overhead 5# or better  X 10+ reps for reaching into cabinets. Rehabilitation Potential For Stated Goals: Good  _________________________________________________________________________  Pre-treatment Symptoms/Complaints:  Sore shoulder from using leaf blower yesterday  Pain: Initial: 3/10 Post Session:  No increase/10   Medications Last Reviewed:  10/19/2021  Updated Objective Findings:  L shoulder  supine active assist flexion = 158 deg., ER assisted ROM to 70 deg. TREATMENT:   THERAPEUTIC ACTIVITY: ( see below for minutes): Therapeutic activities per grid below to improve mobility, strength, balance and coordination.   Required minimal visual, verbal, manual and tactile cues to improve independence and safety with daily activities Sandra Husain THERAPEUTIC EXERCISE: (see below for minutes):  Exercises per grid below to improve mobility, strength, balance and coordination. Required minimal verbal and manual cues to promote proper body alignment, promote proper body posture and promote proper body mechanics. Progressed resistance, range, repetitions and complexity of movement as indicated. MANUAL THERAPY: (see below for minutes): Joint mobilization and Soft tissue mobilization was utilized and necessary because of the patient's restricted joint motion, painful spasm, loss of articular motion and restricted motion of soft tissue. MODALITIES: (see below for minutes):      to decrease pain    SELF CARE: (see below for minutes): Procedure(s) (per grid) utilized to improve and/or restore self-care/home management as related to dressing, bathing and grooming. Required minimal verbal cueing to facilitate activities of daily living skills and compensatory activities.      Date: 10/5/2021 (initial evaluation) 10/7/2021 (visit 2) 10/11/2021 (visit 3) 10/14/2021 (visit 4) 10/19/2021 (visit 5)   Modalities:  15 min 15 mn 15 min 15 min     L shellie ice / IFC estim :  pps L shellie ice / IFC estim : 15 min,   pps L shellie ice / IFC estim : 15 min,   pps L shellie ice / IFC estim : 15 min,   pps                   Therapeutic Exercise: 16min 33  min 33 min 30 min 30 min    L shoulder wand flexion AAROM supine: 10 x 5\" Shoulder wand flexion: 10 x 10\" supine UBE: level 2 resistance x 6 minutes UBE: level 2 resistance x 6 minute UBE: level 2 resistance x 7 minutes    Left shoulder ER wand AAROM supine: 10 x 5\"  L shellie manl resist ER : 3 x 12 supine Shoulder flexion supine AAROM: 10 x 5\" - fingers laced    Shoulder wand flexion supine 10 x 10\" L shellie AAROM wand flexion 10 x 10\"    Left shoulder sidelying ER AROM x 30 reps  L shoulder ER sidelying: 10x no wt, 3 x 10 1# Supine shellie L ER manl resist : 3 x 12 Supine L shellie manl resist ER 3 x 12 Supine L shellie manl resist ER 3 x 12    Left shoulder sidelying sleeper stretch: 10 x 5\"  L shellie Abduction arom 3 x 10 Supine shellie punches: 12x manl, 2 x 12 w/2#. Supine punches: 3 x 12, 2#. Supine punches: 2 x 12, 2#.      L shellie punches supine 3 x 10: 1# Prone shellie extn [email or web address] 3 x 12 Sidelying L shellie ER: 3 x12 @ 1# Prone shellie horiz abd L: 3 x 10 @ 1#     L shellie prone extn: 1# 3 x 10 Prone shellie horiz abd L: 3 x 12@ 1# Prone shoulder extn: 2# 3 x 10 [employer]: L side rows x 30- red     L shellie horiz abd 3 x 10 Wall slides L shoulder AAROM x 20 Prone shellie horiz abd L: 3 x 10 @ 1# Therat[employer]: L shellie IR x 30- Red      L shoulder mid range acitive alphabet 1 set standing L shellie mid range flexion active alphabet - 1 set - standing Theratube:  L shellie ER's x 30 - Red           Proprioceptive Activities:                                Manual Therapy: 8 min 8 min 8 min 12 min 13 min    Left shoulder joint mobilization: [facility] long axis distraction, ap glides, w/ PROM Left shoulder joint mobilization: [address] long axis distraction, ap glides, w/ PROM, end range IR/ER stretches L shellie: GH ap glides, long axis traction w/ end range PROM flex, ER, IR L shellie: GH ap glides, long axis traction w/ end range PROM flex, ER, IR L shellie: GH posterior glides, long axis traction,  w/ end range PROM flex, ER, IR and horiz. Add. Therapeutic Activities:                                  HEP: Perform the following 2x/day:  1. Supine wand flexion AAROM : 10 x 5\"   2. Supine left shoulder wand ER: 10 x 5\"   3. Sidelying shoulder ER 3 x 10 L   4. Sleeper stretch L 10 x 5\"   Added 10/19/2021:   5. L shellie ER 3 x 10 - red tubing  6. L shellie IR 3 x 10 - red tubing  7. L shellie rows 3 x 10 - red tubing. [facility] Portal  Treatment/Session Summary:    · Response to Treatment: Able to advance HEP by adding tubing progression today.    · Communication/Consultation:  None today  · Equipment provided today:  None today  · Recommendations/Intent for next treatment session: Next visit will focus on Increasing left shoulder AROM to full/painfree levels, Progress rotator cuff strength /endurance training. HEP revision ( start tubing program), Modalities as needed to modulate pain.  .    Total Treatment Billable Duration: 60 minute  PT Patient Time In/Time Out  Time In: 1015  Time Out: DEANDRA Downs    Future Appointments   Date Time Provider Monique Gonzalez   10/21/2021 10:15 AM [facility] Websterr[facility]   10/27/2021 11:45 AM Kevin Fields, PT [facility]   10/29/2021 11:45 AM Natalya Mylar, PT [facility]   11/1/2021  3:50 PM Parag White MD [facility]   11/2/2021 10:15 AM Natalya Mylar, PT [facility]   11/4/2021 10:15 AM Natalya Mylar, PT [facility]   11/8/2021 10:15 AM Natalya Mylar, PT [facility]   11/9/2021 10:00 AM Florencio Christensen MD [facility]   11/11/2021 10:15 AM Natalya Mylar, PT [facility]   11/16/2021 10:15 AM Natalya Mylar, PT [facility]   11/18/2021 10:15 AM [facility]   11/23/2021 10:15 AM Natalya Mylar, PT [facility]   11/30/2021 10:15 AM [facility] Fields, [facility]

## 2021-10-21 ENCOUNTER — HOSPITAL ENCOUNTER (OUTPATIENT)
Dept: PHYSICAL THERAPY | Age: 76
Discharge: HOME | End: 2021-10-21
Payer: MEDICARE

## 2021-10-21 PROCEDURE — 97140 MANUAL THERAPY 1/> REGIONS: CPT

## 2021-10-21 PROCEDURE — 97110 THERAPEUTIC EXERCISES: CPT

## 2021-10-21 NOTE — PROGRESS NOTES
Estela Mati. : 1945  [place]  Secondary: [place] [employer] [id number]  [address] @ [address], [place]  [phone]   [phone]      OUTPATIENT PHYSICAL THERAPY: Daily Treatment Note  10/21/2021   ICD-10: Treatment Diagnosis: Pain in left arm (M79.602), Pain in left shoulder (M25.512) and Stiffness of left shoulder, not elsewhere classified (M25.612)  Left shoulder pain, unspecified chronicity [M25.512]   TREATMENT PLAN:  Effective Dates: 10/5/2021 TO 2021 (60 days). Frequency/Duration: 2 times a week for 60 Days  GOALS: (Goals have been discussed and agreed upon with patient.)  Short-Term Functional Goals: Time Frame: 3 weeks  1. Independent performance of home exercise program  2. Improve left shoulder overhead and cross body reach ranges of motion x 25%, without pain limitations. Discharge Goals: Time Frame: 6 weeks  1. Increase left shoulder active overhead reach to 135 deg without pain  2. Pt to demonstrate increased behind-back reaching with left arm to mid-thoracic levels to allow normal personal care/dressing  3. Pt to demonstrate abitliiy to lift overhead 5# or better  X 10+ reps for reaching into cabinets. Rehabilitation Potential For Stated Goals: Good  _________________________________________________________________________  Pre-treatment Symptoms/Complaints:  Shoulder is feeling stronger, no pain today. Pain: Initial: 3/10 Post Session:  No increase/10   Medications Last Reviewed:  10/21/2021  Updated Objective Findings:  L shoulder  supine active assist flexion = 158 deg., ER assisted ROM to 70 deg. L ER strength (MMT) = 4+/5. 10/21/2021: no pain provocation with shoulder horiz.adduction or flexion end range stretching. Full L shellie PROM flexion achieved. TREATMENT:   THERAPEUTIC ACTIVITY: ( see below for minutes):   Therapeutic activities per grid below to improve mobility, strength, balance and coordination. Required minimal visual, verbal, manual and tactile cues to improve independence and safety with daily activities . THERAPEUTIC EXERCISE: (see below for minutes):  Exercises per grid below to improve mobility, strength, balance and coordination. Required minimal verbal and manual cues to promote proper body alignment, promote proper body posture and promote proper body mechanics. Progressed resistance, range, repetitions and complexity of movement as indicated. MANUAL THERAPY: (see below for minutes): Joint mobilization and Soft tissue mobilization was utilized and necessary because of the patient's restricted joint motion, painful spasm, loss of articular motion and restricted motion of soft tissue. MODALITIES: (see below for minutes):      to decrease pain    SELF CARE: (see below for minutes): Procedure(s) (per grid) utilized to improve and/or restore self-care/home management as related to dressing, bathing and grooming. Required minimal verbal cueing to facilitate activities of daily living skills and compensatory activities. Date: 10/21/2021 (visit 6)       Modalities:                                Therapeutic Exercise: 34 min        UBE: level 2 resist: 7 min forw. Supine active shellie flexion L x 30        Supine manl. Resist ER 3 x 10        Supine manl resist shellie punch 3 x 10        Sidelying shellie  L ER 2# 3 x 10        Prone L shellie extn: 2# 3 x 10        Prone shellie horiz abd L: 3 x 10 @ 1#        Table pushups: 3 x 10               Proprioceptive Activities:                                Manual Therapy: 10 min        L shellie: GH posterior glides, long axis traction,  w/ end range PROM flex, ER, IR and horiz. Add. Therapeutic Activities:                                  HEP: Perform the following 2x/day:  1. Supine wand flexion AAROM : 10 x 5\"   2. Supine left shoulder wand ER: 10 x 5\"   3. Sidelying shoulder ER 3 x 10 L   4.  Sleeper stretch L 10 x 5\"   Added 10/19/2021:   5. L shellie ER 3 x 10 - red tubing  6. L shellie IR 3 x 10 - red tubing  7. L shellie rows 3 x 10 - red tubing. [facility] Portal  Treatment/Session Summary:    · Response to Treatment: Strong improvement this week - posterior cuff endurance and strength both increasing, with commensurate decrease in shoulder pain. · Communication/Consultation:  None today  · Equipment provided today:  None today  · Recommendations/Intent for next treatment session: Next visit will focus on Increasing left shoulder AROM to full/painfree levels, Progress rotator cuff strength /endurance training. Modalities as needed to modulate pain.  .    Total Treatment Billable Duration: 45 minute  PT Patient Time In/Time Out  Time In: 1015  Time Out: Monique Gomez, PT    Future Appointments   Date Time Provider Monique Gonzalez   10[facility]27[facility]2021 11:45 AM [facility]   10/29/2021 11:45 AM [facility] Russ[facility]   11/1/2021  3:50 PM Kevin Krueger MD [facility]   11/2/2021 10:15 AM Clista Terrence, PT [facility]   11/4/2021 10:15 AM Clista Terrence, PT [facility]   11/8/2021 10:15 AM Clista Terrence, PT [facility]   11/9/2021 10:00 AM Muna Owusu MD [facility]   11/11/2021 10:15 AM Clista Terrence, PT [facility]   11/16/2021 10:15 AM Clista Terrence, PT [facility]   11/18/2021 10:15 AM [facility]   11/23/2021 10:15 AM Clista Terrence, PT [facility]   11/30/2021 10:15 AM Tawnyudy Coty Bell, PT [facility]

## 2021-10-24 PROBLEM — Z12.5 PROSTATE CANCER SCREENING: Status: RESOLVED | Noted: 2017-01-17 | Resolved: 2021-10-24

## 2021-10-27 ENCOUNTER — HOSPITAL ENCOUNTER (OUTPATIENT)
Dept: PHYSICAL THERAPY | Age: 76
Discharge: HOME | End: 2021-10-27
Payer: MEDICARE

## 2021-10-27 PROCEDURE — 97110 THERAPEUTIC EXERCISES: CPT

## 2021-10-27 PROCEDURE — 97140 MANUAL THERAPY 1/> REGIONS: CPT

## 2021-10-27 NOTE — PROGRESS NOTES
Kavitha Haynes. : 1945  [place]  Secondary: [place] [employer] [id number]  [address] @ [address]  [phone]   [phone]      OUTPATIENT PHYSICAL THERAPY: Daily Treatment Note  10/27/2021   ICD-10: Treatment Diagnosis: Pain in left arm (M79.602), Pain in left shoulder (M25.512) and Stiffness of left shoulder, not elsewhere classified (M25.612)  Left shoulder pain, unspecified chronicity [M25.512]   TREATMENT PLAN:  Effective Dates: 10/5/2021 TO 2021 (60 days). Frequency/Duration: 2 times a week for 60 Days  GOALS: (Goals have been discussed and agreed upon with patient.)  Short-Term Functional Goals: Time Frame: 3 weeks  1. Independent performance of home exercise program  2. Improve left shoulder overhead and cross body reach ranges of motion x 25%, without pain limitations. Discharge Goals: Time Frame: 6 weeks  1. Increase left shoulder active overhead reach to 135 deg without pain  2. Pt to demonstrate increased behind-back reaching with left arm to mid-thoracic levels to allow normal personal care/dressing  3. Pt to demonstrate abitliiy to lift overhead 5# or better  X 10+ reps for reaching into cabinets. Rehabilitation Potential For Stated Goals: Good  _________________________________________________________________________  Pre-treatment Symptoms/Complaints:  Shoulder is feeling much stronger, no pain today. Pain: Initial: 0/10 Post Session:  No increase/10   Medications Last Reviewed:  10/27/2021  Updated Objective Findings:  L shoulder  supine active assist flexion = 158 deg., ER assisted ROM to 70 deg. L ER strength (MMT) = 4+/5. 10/21/2021: no pain provocation with shoulder horiz.adduction or flexion end range stretching. Full L shellie PROM flexion achieved. 10/27/2021: active  L shellie. flexion ~ 140 deg.       TREATMENT:   THERAPEUTIC ACTIVITY: ( see below for minutes): Therapeutic activities per grid below to improve mobility, strength, balance and coordination. Required minimal visual, verbal, manual and tactile cues to improve independence and safety with daily activities . THERAPEUTIC EXERCISE: (see below for minutes):  Exercises per grid below to improve mobility, strength, balance and coordination. Required minimal verbal and manual cues to promote proper body alignment, promote proper body posture and promote proper body mechanics. Progressed resistance, range, repetitions and complexity of movement as indicated. MANUAL THERAPY: (see below for minutes): Joint mobilization and Soft tissue mobilization was utilized and necessary because of the patient's restricted joint motion, painful spasm, loss of articular motion and restricted motion of soft tissue. MODALITIES: (see below for minutes):      to decrease pain    SELF CARE: (see below for minutes): Procedure(s) (per grid) utilized to improve and/or restore self-care/home management as related to dressing, bathing and grooming. Required minimal verbal cueing to facilitate activities of daily living skills and compensatory activities. Date: 10/21/2021 (visit 6) 10/27/2021 (visit 7)      Modalities:                                Therapeutic Exercise: 34 min 37 min       UBE: level 2 resist: 7 min forw. UBE: level 2 resist: 7 min forw. Supine active shellie flexion L x 30 Supine shellie active flexion L 20 x 5\" stretches       Supine manl. Resist ER 3 x 10 Supine punches: 3# x 30       Supine manl resist shellie punch 3 x 10 Sidelying shoulder ER L x 3 x 12 2#       Sidelying shellie  L ER 2# 3 x 10 Prone L shoulder extension 3# 3 x 15        Prone L shellie extn: 2# 3 x 10 Prone L shellie. Horiz. Abd. 0# 2 x 15        Prone shellie horiz abd L: 3 x 10 @ 1# Prone L shellie. Flexion x 15        Standing L shellie flexion slides up wall x 20        Wall pushups x 20         L shellie.  Green tubing IR x 30 (doubled)        HEP revision (see notes) Table pushups: 3 x 10               Proprioceptive Activities:                                Manual Therapy: 10 min 8 min       L shellie: GH posterior glides, long axis traction,  w/ end range PROM flex, ER, IR and horiz. Add. L shellie: GH posterior glides, long axis traction,  w/ end range PROM flex, ER, IR               Therapeutic Activities:                                  HEP: (revised 10/27/2021)  Exercises  Supine Shoulder Flexion Extension Full Range AROM - 20 reps - 5 hold  Shoulder External Rotation with Anchored Resistance - 3 sets - 10 reps  Shoulder Internal Rotation with Resistance - 3 sets - 10 reps  Seated Single Arm Shoulder Row with Anchored Resistance - 3 sets - 10 reps  Standing Shoulder Alphabet - 1 sets  Wall Push Up - 1 sets - 20 reps      [facility] Portal  Treatment/Session Summary:    · Response to Treatment: Steady progression noted over last two visits, with improved rotator cuff endurance, active ROM's. Mid range endurance needs further work, therefore HEP was revised to reflect this today. · Communication/Consultation:  None today  · Equipment provided today:  None today  · Recommendations/Intent for next treatment session: Next visit will focus on Increasing left shoulder AROM to full/painfree levels, Progress rotator cuff strength /endurance training. Modalities as needed to modulate pain.  .    Total Treatment Billable Duration: 50 minute  PT Patient Time In/Time Out  Time In: 3977  Time Out: 6001 Rodo Li, DEANDRA    Future Appointments   Date Time Provider Monique Gonzalez   11/1/2021  3:50 PM Bruce Rebollar MD [facility]   11/2/2021 10:15 AM Mary Sidhu, PT [facility]   11/4/2021 10:15 AM Mary Sidhu, PT [facility]   11/5/2021 11:45 AM Mary Sidhu, PT [facility]   11/8/2021 10:15 AM Mary Sidhu, PT [facility]   11/9/2021 10:00 AM Rubén Burger MD [facility]   11/11/2021 10:15 AM Lesa Orlando, PT [facility] 11/18/2021 10:15 AM Skippy Bark, PT [facility]   11/23/2021 10:15 AM Skippy Bark, PT [facility]   11/30/2021 10:15 AM [facility] Louie, [facility]

## 2021-10-29 ENCOUNTER — APPOINTMENT (OUTPATIENT)
Dept: PHYSICAL THERAPY | Age: 76
End: 2021-10-29
Payer: MEDICARE

## 2021-11-02 ENCOUNTER — HOSPITAL ENCOUNTER (OUTPATIENT)
Dept: PHYSICAL THERAPY | Age: 76
Discharge: HOME | End: 2021-11-02
Payer: MEDICARE

## 2021-11-02 PROCEDURE — 97110 THERAPEUTIC EXERCISES: CPT

## 2021-11-02 PROCEDURE — 97140 MANUAL THERAPY 1/> REGIONS: CPT

## 2021-11-02 NOTE — PROGRESS NOTES
Tariq Washington. : 1945  [place]  [place]: [place] [employer] [id number] @ [address], [place]  [phone]   [phone]      OUTPATIENT PHYSICAL THERAPY: Progress Report and Daily Treatment Note  2021   ICD-10: Treatment Diagnosis: Pain in left arm (M79.602), Pain in left shoulder (M25.512) and Stiffness of left shoulder, not elsewhere classified (M25.612)  Left shoulder pain, unspecified chronicity [M25.512]   TREATMENT PLAN:  Effective Dates: 10/5/2021 TO 2021 (60 days). Frequency/Duration: 2 times a week for 60 Days  GOALS: (Goals have been discussed and agreed upon with patient.)  Short-Term Functional Goals: Time Frame: 3 weeks  1. Independent performance of home exercise program (MET)  2. Improve left shoulder overhead and cross body reach ranges of motion x 25%, without pain limitations. (MET)  Discharge Goals: Time Frame: 6 weeks  1. Increase left shoulder active overhead reach to 135 deg without pain (MET)  2. Pt to demonstrate increased behind-back reaching with left arm to mid-thoracic levels to allow normal personal care/dressing (PROGRESSING)  3. Pt to demonstrate abitliiy to lift overhead 5# or better  X 10+ reps for reaching into cabinets. (PROGRESSING)  Rehabilitation Potential For Stated Goals: Good  _________________________________________________________________________  Pre-treatment Symptoms/Complaints:  Continued L shoulder improvement, no pain today. Pain: Initial: 0/10 Post Session:  No increase/10   Medications Last Reviewed:  2021  Updated Objective Findings:  Observation/Orthostatic Postural Assessment: Forward head posture. m.   Palpation:    Left humeral head point-tenderness  ROM:    Shoulder: AROM's: flexion = 140 deg left, 132 deg right. Abduction = 127 deg left (pain limited), 142 deg right. External rotation: 62 deg left, 50 deg right. Apley ER with reach behind head: C7 level left, T2 level right. Internal rotation with reach behind back: left = L3 (pain limited), right = T6. Shoulder L PROM's: flexion = 145 deg (pain), ER = 85 deg, IR = 78 deg. Strength:   Hand held dynamometry: Shoulder flexion: left = 22#, right = 33#. External rotation: left = 17.2#, right = 15.1#. Internal rotation: Left = 17.1# (pain), right = 26.3#. Special Tests:    + left shoulder Serrano-Al test.   Neurological Screen:  Symmetrical biceps, triceps and brachioradialis DTR's         TREATMENT:   THERAPEUTIC ACTIVITY: ( see below for minutes): Therapeutic activities per grid below to improve mobility, strength, balance and coordination. Required minimal visual, verbal, manual and tactile cues to improve independence and safety with daily activities . THERAPEUTIC EXERCISE: (see below for minutes):  Exercises per grid below to improve mobility, strength, balance and coordination. Required minimal verbal and manual cues to promote proper body alignment, promote proper body posture and promote proper body mechanics. Progressed resistance, range, repetitions and complexity of movement as indicated. MANUAL THERAPY: (see below for minutes): Joint mobilization and Soft tissue mobilization was utilized and necessary because of the patient's restricted joint motion, painful spasm, loss of articular motion and restricted motion of soft tissue. MODALITIES: (see below for minutes):      to decrease pain    SELF CARE: (see below for minutes): Procedure(s) (per grid) utilized to improve and/or restore self-care/home management as related to dressing, bathing and grooming. Required minimal verbal cueing to facilitate activities of daily living skills and compensatory activities.      Date: 10/21/2021 (visit 6) 10/27/2021 (visit 7) 11/2/2021 (visit 8)     Modalities:                                Therapeutic Exercise: 34 min 37 min 36 min      UBE: level 2 resist: 7 min forw. Chase Halller: level 2 resist: 7 min forw. UBE: level 2 resist: 7 min forw. Supine active shellie flexion L x 30 Supine shellie active flexion L 20 x 5\" stretches Supine shellie active flexion L 10 x 10\"      Supine manl. Resist ER 3 x 10 Supine punches: 3# x 30 Supine punches: 4# x 30      Supine manl resist shellie punch 3 x 10 Sidelying shoulder ER L x 3 x 12 2# Sidelying shellie ER; L 2 x 15 @ 2#      Sidelying shellie  L ER 2# 3 x 10 Prone L shoulder extension 3# 3 x 15  Prone L shellie extn : 4# : 2 x 15      Prone L shellie extn: 2# 3 x 10 Prone L shellie. Horiz. Abd. 0# 2 x 15  Prone L shellie horiz abd x 15      Prone shellie horiz abd L: 3 x 10 @ 1# Prone L shellie. Flexion x 15 Prone L shellie flex x 15       Standing L shellie flexion slides up wall x 20  Prone L shellie abd: 4 x 5 reps       Wall pushups x 20  Seated L shellie overhead press; 10 @ 1#, 10 @ 2#       L shellie. Green tubing IR x 30 (doubled) Wall pushups x 20       HEP revision (see notes)       Table pushups: 3 x 10               Proprioceptive Activities:                                Manual Therapy: 10 min 8 min 8 min      L shellie: GH posterior glides, long axis traction,  w/ end range PROM flex, ER, IR and horiz. Add. L shellie: GH posterior glides, long axis traction,  w/ end range PROM flex, ER, IR  L shellie: GH posterior glides, inf glidez, long axis traction,  w/ end range PROM flex,  horiz add              Therapeutic Activities:                                  HEP: (revised 10/27/2021)  Exercises  Supine Shoulder Flexion Extension Full Range AROM - 20 reps - 5 hold  Shoulder External Rotation with Anchored Resistance - 3 sets - 10 reps  Shoulder Internal Rotation with Resistance - 3 sets - 10 reps  Seated Single Arm Shoulder Row with Anchored Resistance - 3 sets - 10 reps  Standing Shoulder Alphabet - 1 sets  Wall Push Up - 1 sets - 20 reps      [employer] Portal  Treatment/Session Summary:    · Response to Treatment: Excellent progress towards meeting all goals - STG's met.  Able to progress pt into overhead reach activities today. · Communication/Consultation:  None today  · Equipment provided today:  None today  · Recommendations/Intent for next treatment session: Next visit will focus on progressing rotator cuff strength /endurance training, overhead reach and push/pull motions. . Modalities as needed to modulate pain.  .    Total Treatment Billable Duration: 45 minute  PT Patient Time In/Time Out  Time In: 1015  Time Out: [place], PT    Future Appointments   Date Time Provider Monique Gonzalez   11/4/2021 10:15 AM John Harris, PT [facility]   11/8/2021 10:15 AM [facility], [facility]   11/9/2021 10:00 AM Savannah Ruiz MD [facility]   11/11/2021 10:15 AM John Harris, PT [facility]   11/18/2021 10:15 AM John Harris, PT [facility]   11/23/2021 10:15 AM John Harris, PT [facility]   11/30/2021 10:15 AM Sigrid Mendez, PT [facility]

## 2021-11-04 ENCOUNTER — HOSPITAL ENCOUNTER (OUTPATIENT)
Dept: PHYSICAL THERAPY | Age: 76
Discharge: HOME | End: 2021-11-04
Payer: MEDICARE

## 2021-11-04 PROCEDURE — 97110 THERAPEUTIC EXERCISES: CPT

## 2021-11-04 PROCEDURE — 97140 MANUAL THERAPY 1/> REGIONS: CPT

## 2021-11-04 NOTE — PROGRESS NOTES
Mack Boas. : 1945  [place]  Secondary: [place] [employer] [id number] @ [address] [place] [address]  [phone]   [phone]      OUTPATIENT PHYSICAL THERAPY: Daily Treatment Note  2021   ICD-10: Treatment Diagnosis: Pain in left arm (M79.602), Pain in left shoulder (M25.512) and Stiffness of left shoulder, not elsewhere classified (M25.612)  Left shoulder pain, unspecified chronicity [M25.512]   TREATMENT PLAN:  Effective Dates: 10/5/2021 TO 2021 (60 days). Frequency/Duration: 2 times a week for 60 Days  GOALS: (Goals have been discussed and agreed upon with patient.)  Short-Term Functional Goals: Time Frame: 3 weeks  1. Independent performance of home exercise program (MET)  2. Improve left shoulder overhead and cross body reach ranges of motion x 25%, without pain limitations. (MET)  Discharge Goals: Time Frame: 6 weeks  1. Increase left shoulder active overhead reach to 135 deg without pain (MET)  2. Pt to demonstrate increased behind-back reaching with left arm to mid-thoracic levels to allow normal personal care/dressing (PROGRESSING)  3. Pt to demonstrate abitliiy to lift overhead 5# or better  X 10+ reps for reaching into cabinets. (PROGRESSING)  4. Indep. Performance of low back HEP. Rehabilitation Potential For Stated Goals: Good  _________________________________________________________________________  Pre-treatment Symptoms/Complaints:  Left shoulder soreness today. Has history of lower lumbar soreness (referral to add low back to current program), bilateral lower lumbar pain without LE radiation, no pain with cough/sneeze, no specific LE weakness. Past history of bilat LE pain prior to lumbar (L4-5 ) laminectomy ~ . Current symptoms modulated with walking, worst with standing in place for a few minutes, mild with sitting for long periods.    Pain: Initial: 0/10 Post Session:  No increase/10   Medications Last Reviewed:  11/4/2021  Updated Objective Findings:  Observation/Orthostatic Postural Assessment: Forward head posture. Neutral lumbar posture   Palpation:    Left humeral head point-tenderness  ROM:    Shoulder: AROM's: flexion = 140 deg left, 132 deg right. Abduction = 127 deg left (pain limited), 142 deg right. External rotation: 62 deg left, 50 deg right. Apley ER with reach behind head: C7 level left, T2 level right. Internal rotation with reach behind back: left = L3 (pain limited), right = T6. Shoulder L PROM's: flexion = 145 deg (pain), ER = 85 deg, IR = 78 deg. Lumbar: (11/4/2021): flexion = 27 deg (trunk flexion = 87 deg), extension = 13 deg (trunk = 24 deg): no pain reproduciton with either sing/repeated trunk flexion or trunk extension in standing  Hip (11/4/2021)  Hip flexion : left = 123 deg, end range pain reproduction, right = 125 deg. Hip IR: left = 25 deg, right = 31 deg. ER: left = 40 deg, right = 43 deg. Pain provocation Left lowe lumbar w/ R hip scour test, neg. L/R hip HANNA tests. Strength:   Hand held dynamometry: Shoulder flexion: left = 22#, right = 33#. External rotation: left = 17.2#, right = 15.1#. Internal rotation: Left = 17.1# (pain), right = 26.3#. LE myotomes L1-L5 all 5/5. Special Tests:    + left shoulder Serrano-Al test.   Neurological Screen:  Symmetrical biceps, triceps and brachioradialis DTR's. +1 patellar and achilles DTR's. Neg L/R SLR  Palpation: focal L/R lumbosacral junction tenderness. TREATMENT:   THERAPEUTIC ACTIVITY: ( see below for minutes): Therapeutic activities per grid below to improve mobility, strength, balance and coordination. Required minimal visual, verbal, manual and tactile cues to improve independence and safety with daily activities . THERAPEUTIC EXERCISE: (see below for minutes):  Exercises per grid below to improve mobility, strength, balance and coordination.   Required minimal verbal and manual cues to promote proper body alignment, promote proper body posture and promote proper body mechanics. Progressed resistance, range, repetitions and complexity of movement as indicated. MANUAL THERAPY: (see below for minutes): Joint mobilization and Soft tissue mobilization was utilized and necessary because of the patient's restricted joint motion, painful spasm, loss of articular motion and restricted motion of soft tissue. MODALITIES: (see below for minutes):      to decrease pain    SELF CARE: (see below for minutes): Procedure(s) (per grid) utilized to improve and/or restore self-care/home management as related to dressing, bathing and grooming. Required minimal verbal cueing to facilitate activities of daily living skills and compensatory activities. Date: 10/21/2021 (visit 6) 10/27/2021 (visit 7) 2021 (visit 8) 2021 (visit 9)    Modalities:                                Therapeutic Exercise: 34 min 37 min 36 min 28 min     UBE: level 2 resist: 7 min forw. UBE: level 2 resist: 7 min forw. UBE: level 2 resist: 7 min forw. UBE: level 2, 5 min forward     Supine active shellie flexion L x 30 Supine shellie active flexion L 20 x 5\" stretches Supine shellie active flexion L 10 x 10\" Supine shellie flexion L w/ 1# x 20 reps     Supine manl. Resist ER 3 x 10 Supine punches: 3# x 30 Supine punches: 4# x 30 LTR AROM  X 20 reps L/R     Supine manl resist shellie punch 3 x 10 Sidelying shoulder ER L x 3 x 12 2# Sidelying shellie ER; L 2 x 15 @ 2# Bridging : 15 x 3 \"      Sidelying shellie  L ER 2# 3 x 10 Prone L shoulder extension 3# 3 x 15  Prone L shellie extn : 4# : 2 x 15 Hooklying bilat hip clamshell w/ black band at kneex  X 30 reps     Prone L shellie extn: 2# 3 x 10 Prone L shellie. Horiz. Abd. 0# 2 x 15  Prone L shellie horiz abd x 15 Lumbar extension in standin reps      Prone shellie horiz abd L: 3 x 10 @ 1# Prone L shellie.  Flexion x 15 Prone L shellie flex x 15 HEP revision: see notes below      Standing L shellie flexion slides up wall x 20  Prone L shellie abd: 4 x 5 reps       Wall pushups x 20  Seated L shellie overhead press; 10 @ 1#, 10 @ 2#       L shellie. Green tubing IR x 30 (doubled) Wall pushups x 20       HEP revision (see notes)       Table pushups: 3 x 10               Proprioceptive Activities:                                Manual Therapy: 10 min 8 min 8 min 12 min     L shellie: GH posterior glides, long axis traction,  w/ end range PROM flex, ER, IR and horiz. Add. L shellie: GH posterior glides, long axis traction,  w/ end range PROM flex, ER, IR  L shellie: GH posterior glides, inf glidez, long axis traction,  w/ end range PROM flex,  horiz add  STM: L/R lower lumbar        L shellie GH long axis traction w/ ER /IR end range stretching. Therapeutic Activities:                                  HEP: (revised 10/27/2021)  Exercises  Supine Shoulder Flexion Extension Full Range AROM - 20 reps - 5 hold  Shoulder External Rotation with Anchored Resistance - 3 sets - 10 reps  Shoulder Internal Rotation with Resistance - 3 sets - 10 reps  Seated Single Arm Shoulder Row with Anchored Resistance - 3 sets - 10 reps  Standing Shoulder Alphabet - 1 sets  Wall Push Up - 1 sets - 20 reps     Added 11/4/2021:   Supine Lower Trunk Rotation - 20 reps  Supine Bridge - 15 reps - 3 second hold  Hooklying Clamshell with Resistance - 30 reps  Standing Back Extension - 4 x daily - 5 reps - 5 hold      MedBridge Portal  Treatment/Session Summary:    · Response to Treatment: Additional exam of low back revealed localized lumbosacral junction pain reproduced with static posture maintenance, modulated w/ both flexion and extension motions. Will integrate low back exercise program  Into shoulder rehab. · Communication/Consultation:  None today  · Equipment provided today:  None today  · Recommendations/Intent for next treatment session: Next visit will focus on progressing rotator cuff strength /endurance training, overhead reach and push/pull motions.  Low back stabilitiy training. . Modalities as needed to modulate pain.  .    Total Treatment Billable Duration: 45 minute  PT Patient Time In/Time Out  Time In: 1015  [facility]    Future Appointments   Date Time Provider Monique Gonzalez   11/8/2021 10:15 AM Pansmitchel Ganong, PT [facility]   11/9/2021 10:00 AM MD SACHI Barber   11/11/2021 10:15 AM Pansy Ganong, PT [facility]   11/18/2021 10:15 AM Pansy Ganong, PT [facility]   11/23/2021 10:15 AM Pansy Ganong, PT [facility]   11/30/2021 10:15 AM [facility]

## 2021-11-05 ENCOUNTER — APPOINTMENT (OUTPATIENT)
Dept: PHYSICAL THERAPY | Age: 76
End: 2021-11-05
Payer: MEDICARE

## 2021-11-09 ENCOUNTER — APPOINTMENT (OUTPATIENT)
Dept: PHYSICAL THERAPY | Age: 76
End: 2021-11-09
Payer: MEDICARE

## 2021-11-11 ENCOUNTER — HOSPITAL ENCOUNTER (OUTPATIENT)
Dept: PHYSICAL THERAPY | Age: 76
Discharge: HOME | End: 2021-11-11
Payer: MEDICARE

## 2021-11-11 PROCEDURE — 97110 THERAPEUTIC EXERCISES: CPT

## 2021-11-11 NOTE — THERAPY DISCHARGE
Ridge Mancia. : 1945  [place]  [place]: [place] [employer]  [address]  [phone]   [phone]      OUTPATIENT PHYSICAL THERAPY: DISCHARGE SUMMARY/Daily Treatment Note  2021   ICD-10: Treatment Diagnosis: Pain in left arm (M79.602), Pain in left shoulder (M25.512) and Stiffness of left shoulder, not elsewhere classified (M25.612)  Left shoulder pain, unspecified chronicity [M25.512]   TREATMENT PLAN:  Effective Dates: 10/5/2021 TO 2021 (60 days). Frequency/Duration: 2 times a week for 60 Days  GOALS: (Goals have been discussed and agreed upon with patient.)  Short-Term Functional Goals: Time Frame: 3 weeks  1. Independent performance of home exercise program (MET)  2. Improve left shoulder overhead and cross body reach ranges of motion x 25%, without pain limitations. (MET)  Discharge Goals: Time Frame: 6 weeks  1. Increase left shoulder active overhead reach to 135 deg without pain (MET)  2. Pt to demonstrate increased behind-back reaching with left arm to mid-thoracic levels to allow normal personal care/dressing (MET)  3. Pt to demonstrate abitliiy to lift overhead 5# or better  X 10+ reps for reaching into cabinets. (Partially met)     Rehabilitation Potential For Stated Goals: Good  Regarding Gisela Chandler Jr.'s therapy, I certify that the treatment plan above will be carried out by a therapist or under their direction. Thank you for this referral,  Lurdes Hurt, PT       Referring Physician Signature: Kelvin Remy MD                                            Date                     _________________________________________________________________________  Pre-treatment Symptoms/Complaints:  No shoulder pain - physician has released him , shoulder feels better now than opposite shoulder.    Pain: Initial: 0/10 Post Session:  No increase/10 Medications Last Reviewed:  11/11/2021  Updated Objective Findings:  Observation/Orthostatic Postural Assessment: Forward head posture. Neutral lumbar posture   Palpation:    Left humeral head point-tenderness  ROM:  Shoulder: AROM's: flexion = 140 deg left, 132 deg right. Abduction = 125 deg left , 142 deg right. External rotation: 62 deg left, 50 deg right. Apley ER with reach behind head: T2 level left, T2 level right. Internal rotation with reach behind back: left = T8, right = T6. Shoulder L PROM's: flexion = 151 deg, ER = 85 deg, IR = 79 deg. Strength:   Hand held dynamometry: Shoulder flexion: left = 21#, right = 33#. External rotation: left = 21#, right = 15.1#. Internal rotation: Left = 24# , right = 26.3#. LE myotomes L1-L5 all 5/5. Special Tests:    + left shoulder Serrano-Al test.   Neurological Screen:  Symmetrical biceps, triceps and brachioradialis DTR's. +1 patellar and achilles DTR's. Neg L/R SLR  Palpation: focal L/R lumbosacral junction tenderness. Tool Used: Disabilities of the Arm, Shoulder and Hand (DASH) Questionnaire - Quick Version  Score:  Initial: 29/55  Most Recent: 11/55 (Date: 11/11/2021 )   Interpretation of Score: The DASH is designed to measure the activities of daily living in person's with upper extremity dysfunction or pain. Each section is scored on a 1-5 scale, 5 representing the greatest disability. The scores of each section are added together for a total score of 55. TREATMENT:   THERAPEUTIC ACTIVITY: ( see below for minutes): Therapeutic activities per grid below to improve mobility, strength, balance and coordination. Required minimal visual, verbal, manual and tactile cues to improve independence and safety with daily activities . THERAPEUTIC EXERCISE: (see below for minutes):  Exercises per grid below to improve mobility, strength, balance and coordination.   Required minimal verbal and manual cues to promote proper body alignment, promote proper body posture and promote proper body mechanics. Progressed resistance, range, repetitions and complexity of movement as indicated. MANUAL THERAPY: (see below for minutes): Joint mobilization and Soft tissue mobilization was utilized and necessary because of the patient's restricted joint motion, painful spasm, loss of articular motion and restricted motion of soft tissue. MODALITIES: (see below for minutes):      to decrease pain    SELF CARE: (see below for minutes): Procedure(s) (per grid) utilized to improve and/or restore self-care/home management as related to dressing, bathing and grooming. Required minimal verbal cueing to facilitate activities of daily living skills and compensatory activities. Date: 10/21/2021 (visit 6) 10/27/2021 (visit 7) 2021 (visit 8) 2021 (visit 9) 2021 (VISIT 10)   Modalities:                                Therapeutic Exercise: 34 min 37 min 36 min 28 min 42 min    UBE: level 2 resist: 7 min forw. UBE: level 2 resist: 7 min forw. UBE: level 2 resist: 7 min forw. UBE: level 2, 5 min forward UBE: level 2 resist: 7 min forw. Supine active shellie flexion L x 30 Supine shellie active flexion L 20 x 5\" stretches Supine shellie active flexion L 10 x 10\" Supine shellie flexion L w/ 1# x 20 reps SHELLIE L PROM stretches: flexion, ER, IR: 5 min    Supine manl. Resist ER 3 x 10 Supine punches: 3# x 30 Supine punches: 4# x 30 LTR AROM  X 20 reps L/R Supine L shellie flexion 2# x 30 reps    Supine manl resist shellie punch 3 x 10 Sidelying shoulder ER L x 3 x 12 2# Sidelying shellie ER; L 2 x 15 @ 2# Bridging : 15 x 3 \"  Prone L shellie. horiz abd: 30x , 2#    Sidelying shellie  L ER 2# 3 x 10 Prone L shoulder extension 3# 3 x 15  Prone L shellie extn : 4# : 2 x 15 Hooklying bilat hip clamshell w/ black band at kneex  X 30 reps Prone shellie flexion x 30    Prone L shellie extn: 2# 3 x 10 Prone L shellie. Horiz.  Abd. 0# 2 x 15  Prone L shellie horiz abd x 15 Lumbar extension in standin reps  Standing tubing rows x 30 - green    Prone shellie horiz abd L: 3 x 10 @ 1# Prone L shellie. Flexion x 15 Prone L shellie flex x 15 HEP revision: see notes below Standing tubing IR x 30 - green     Standing L shellie flexion slides up wall x 20  Prone L shellie abd: 4 x 5 reps  Standing tubinf ER: 30x , green     Wall pushups x 20  Seated L shellie overhead press; 10 @ 1#, 10 @ 2#  Wall pushups x 30     L shellie. Green tubing IR x 30 (doubled) Wall pushups x 20       HEP revision (see notes)       Table pushups: 3 x 10               Proprioceptive Activities:                                Manual Therapy: 10 min 8 min 8 min 12 min     L shellie: GH posterior glides, long axis traction,  w/ end range PROM flex, ER, IR and horiz. Add. L shellie: GH posterior glides, long axis traction,  w/ end range PROM flex, ER, IR  L shellie: GH posterior glides, inf glidez, long axis traction,  w/ end range PROM flex,  horiz add  STM: L/R lower lumbar        L shellie GH long axis traction w/ ER /IR end range stretching. Therapeutic Activities:                                  HEP: (revised 11/11/2021)  Exercises  Supine Shoulder Flexion Extension Full Range AROM - 30 reps - 5 hold  Shoulder External Rotation with Anchored Resistance - 3 sets - 10 reps  Shoulder Internal Rotation with Resistance - 3 sets - 10 reps  Seated Single Arm Shoulder Row with Anchored Resistance - 3 sets - 10 reps  Standing Shoulder Alphabet - 1 sets  Wall Push Up - 1 sets - 30 reps    MedBridge Portal  Treatment/Session Summary:    · Response to Treatment: Stable point for discharge - Majority of goals met, pt very pleased with his status and ready to continue with home exercise after today.    · Communication/Consultation:  None today  · Equipment provided today:  Green theratubing set  · Recommendations/Intent for next treatment session: Discharge to home shoulder program. Resume therapy under new referral for lower back    Total Treatment Billable Duration: 42 minute  PT Patient Time In/Time Out  Time In: 1017  Time Out: [address] Denise [place], [place]    Future Appointments   Date Time Provider Monique Gonzalez   11/18/2021 10:15 AM Shlomo Fuentes PT [facility]   11/23/2021 10:15 AM Shlomo Fuentes PT [facility]   11/30/2021 10:15 AM Denise [place], Quinten Salina, PT [facility] [place] [facility]

## 2021-11-16 ENCOUNTER — APPOINTMENT (OUTPATIENT)
Dept: PHYSICAL THERAPY | Age: 76
End: 2021-11-16
Payer: MEDICARE

## 2021-11-18 ENCOUNTER — HOSPITAL ENCOUNTER (OUTPATIENT)
Dept: PHYSICAL THERAPY | Age: 76
Discharge: HOME | End: 2021-11-18
Payer: MEDICARE

## 2021-11-18 ENCOUNTER — APPOINTMENT (OUTPATIENT)
Dept: PHYSICAL THERAPY | Age: 76
End: 2021-11-18
Payer: MEDICARE

## 2021-11-18 DIAGNOSIS — M54.50 LOW BACK PAIN, UNSPECIFIED BACK PAIN LATERALITY, UNSPECIFIED CHRONICITY, UNSPECIFIED WHETHER SCIATICA PRESENT: ICD-10-CM

## 2021-11-18 DIAGNOSIS — M47.816 SPONDYLOSIS OF LUMBAR REGION WITHOUT MYELOPATHY OR RADICULOPATHY: ICD-10-CM

## 2021-11-18 DIAGNOSIS — M53.3 SACROILIAC JOINT PAIN: ICD-10-CM

## 2021-11-18 PROCEDURE — 97140 MANUAL THERAPY 1/> REGIONS: CPT

## 2021-11-18 PROCEDURE — 97110 THERAPEUTIC EXERCISES: CPT

## 2021-11-18 PROCEDURE — 97161 PT EVAL LOW COMPLEX 20 MIN: CPT

## 2021-11-18 NOTE — PROGRESS NOTES
[employer]. : 1945  [place]  Secondary: [place] [employer]  [address]  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 Frequency/Duration: 2 times a week for 60 Days  GOALS: (Goals have been discussed and agreed upon with patient.)  Short-Term Functional Goals: Time Frame: 4 weeks  1. Independent performance of home exercise program  2. Pt to demonstrate ability to stand/work at counter up to 15 minutes with minimal pain interruption  3. Improve hip abductor/extensor strength to 4+/5 with abiltiy to perform sit-stands while lifting 10# 3 x 10  Discharge Goals: Time Frame: 8 weeks;   1. Pt to demonstrate ability to stand/work at counter up to 20 minutes w/ minimal pain interruption  2. Improve NATE score to < 15/50 to reflect incraesd standing lifting sitting tolerance  3. Pt to demonstrate ability o to Sit : 1 hour without pain interruption. Rehabilitation Potential For Stated Goals: Good  _________________________________________________________________________  Pre-treatment Symptoms/Complaints:  See today's initial evaluation report  Pain: Initial: 7/10 Post Session:  7/10   Medications Last Reviewed:  2021  Updated Objective Findings:  Below measures from initial evaluation unless otherwise noted.       TREATMENT:   THERAPEUTIC ACTIVITY: ( see below for minutes): Therapeutic activities per grid below to improve mobility, strength, balance and coordination. Required minimal visual, verbal, manual and tactile cues to improve independence and safety with daily activities . THERAPEUTIC EXERCISE: (see below for minutes):  Exercises per grid below to improve mobility, strength, balance and coordination. Required minimal verbal and manual cues to promote proper body alignment, promote proper body posture and promote proper body mechanics. Progressed resistance, range, repetitions and complexity of movement as indicated. MANUAL THERAPY: (see below for minutes): Joint mobilization and Soft tissue mobilization was utilized and necessary because of the patient's restricted joint motion, painful spasm, loss of articular motion and restricted motion of soft tissue. MODALITIES: (see below for minutes):      to decrease pain    SELF CARE: (see below for minutes): Procedure(s) (per grid) utilized to improve and/or restore self-care/home management as related to dressing, bathing and grooming. Required minimal verbal cueing to facilitate activities of daily living skills and compensatory activities. Date: 11/18/2021 (visit 1)       Modalities:                                Therapeutic Exercise: 15 min        Bridging: 15 x 5\"        Single leg bridgse : 5 x 15\" each L/R, alternating, with trunk rotation        Clams: 30 each L/R        Seated hamstring stretch: 2 x 30\" each L/R , alternating        Written program (see notes)               Proprioceptive Activities:                                Manual Therapy: 10 min        STM L/R SI joint lines : 6 min        L/ R SI short axis tractioning: 2 x 1 min each L/R       Therapeutic Activities:                                  HEP: Access Code: [email or web address]  URL: [email or web address]  Date: 11/18/2021  Prepared by: Barbara Acosta  Supine Bridge - 2 x daily - 7 x weekly - 15 reps - 5 hold  Lumbar Rotation Unilateral Bridge - 2 x daily - 7 x weekly - 5 reps - 1 hold  Clamshell - 2 x daily - 7 x weekly - 30 reps  Seated Hamstring Stretch - 2 x daily - 7 x weekly - 2 sets - 30 seconds hold    MedBridge Portal  Treatment/Session Summary:    · Response to Treatment:  Patient is independent with performance of above described home program.  · Communication/Consultation:  None today  · Equipment provided today:  None today  · Recommendations/Intent for next treatment session: Next visit will focus on SI joint mobilizatoin/self mobilization techniques, posterior hip strength/endurance and single leg stance stability, exercise conditioning to increase pain-free standing capacity, modalities PRN for pain modulation.  .    Total Treatment Billable Duration:  50 min  PT Patient Time In/Time Out  Time In: 1020  Time Out: [address], [place]ANDRA    Future Appointments   Date Time Provider Monique Gonzalez   11/23/2021 10:15 AM Skippy Bark, PT [facility]   11/30/2021 10:15 AM Skippy Bark, PT [facility]   12/2/2021 10:15 AM Skippy Bark, PT [facility]   12/7/2021 10:15 AM Skippy Bark, PT [facility]   12/9/2021 10:15 AM Skippy Bark, PT [facility]   12/14/2021 10:15 AM Skippy Bark, PT [facility]   12/16/2021 10:15 AM Skippy Bark, PT [facility]   12/21/2021 10:15 AM Skippy Bark, PT [facility]   12/28/2021 10:15 AM Angie Louie, PT [facility]

## 2021-11-18 NOTE — THERAPY EVALUATION
Lisa Tolentino. : 1945 [address]  [address] [place] [address]  [phone]   [phone]        OUTPATIENT PHYSICAL THERAPY:Initial Assessment 2021         ICD-10: Treatment Diagnosis: Pain in right hip (M25.551) and Pain in left hip (M25.552) and Low back pain (M54.5) and Stiffness of unspecified joint, not elsewhere classified (M25.60)  Precautions/Allergies: Other medication and Red wine extract   Fall Risk Score:     Ambulatory/Rehab Services H2 Model Falls Risk Assessment    Risk Factors:       (1)  Gender [Male]       (1)  Visual Impairment [specify:  eyeglasses] Ability to Rise from Chair:       (0)  Ability to rise in a single movement    Falls Prevention Plan:       No modifications necessary   Total: (5 or greater = High Risk): 2    © [facility]. All Rights Reserved. [facility] Patent #[id number]. Federal Law prohibits the replication, distribution or use without written permission from [facility] of [address] [place] [address]     MD Orders: Evaluate and Treat MEDICAL/REFERRING DIAGNOSIS:  Low back pain, unspecified back pain laterality, unspecified chronicity, unspecified whether sciatica present [M54.50]  Sacroiliac joint pain [M53.3]  Spondylosis of lumbar region without myelopathy or radiculopathy [M47.816]   DATE OF ONSET: 2021  REFERRING PHYSICIAN: Rosalinda Beckett., *  RETURN PHYSICIAN APPOINTMENT: TBD     INITIAL ASSESSMENT:  Mr. Sara Becker presents with bilateral sacroiliac pain that hinders his ability to perform static activities; standing in place, standing in line, working at a counter are all pain limited. He requires skilled PT intervention to restore normal spinal function for performance of ADL's requiring static posture maintenance. PROBLEM LIST (Impacting functional limitations):  1. Decreased ADL/Functional Activities  2. Increased Pain  3. Decreased Activity Tolerance  4.  Decreased [place] with Home Exercise Program INTERVENTIONS PLANNED:  1. Electrical Stimulation  2. Home Exercise Program (HEP)  3. Manual Therapy  4. Range of Motion (ROM)  5. Therapeutic Activites  6. Therapeutic Exercise/Strengthening    TREATMENT PLAN:  Effective Dates: 11/18/2021 TO 1/17/2022 (60 days). Frequency/Duration: 2 times a week for 60 Days  GOALS: (Goals have been discussed and agreed upon with patient.)  Short-Term Functional Goals: Time Frame: 4 weeks  1. Independent performance of home exercise program  2. Pt to demonstrate ability to stand/work at counter up to 15 minutes with minimal pain interruption  3. Improve hip abductor/extensor strength to 4+/5 with abiltiy to perform sit-stands while lifting 10# 3 x 10  Discharge Goals: Time Frame: 8 weeks;   1. Pt to demonstrate ability to stand/work at counter up to 20 minutes w/ minimal pain interruption  2. Improve NATE score to < 15/50 to reflect incraesd standing lifting sitting tolerance  3. Pt to demonstrate ability o to Sit : 1 hour without pain interruption. Rehabilitation Potential For Stated Goals: Good  Regarding Serge Mireles Jr.'s therapy, I certify that the treatment plan above will be carried out by a therapist or under their direction. Thank you for this referral,  French Orozco, PT       Referring Physician Signature: Ronelle Lennox., *              Date                      HISTORY:   History of Present Injury/Illness (Reason for Referral):  Pt reports a history of lower back pain with only occasional LE pain radiation which dates back to February 2021. He has a preceding history of low back dysfunction and history of lumbar laminectomy in 2014. His current problems with back pain occur during attempts to  place - his back becomes painful quickly and he's limited to 5 minutes of standing before having to change positions.  Sitting is limited by pain but not to the same magnitude (1 hour sitting tolerance), Walking and trunk fleixon/extension do not reproduce pain, he has no pain with cough/sneeze, no specific LE weakness. His symptoms are modulated with walking, worst with standing in place for a few minutes,     Past Medical History/Comorbidities:   Mr. Madison Noel  has a past medical history of ADD (attention deficit disorder), Anemia, unspecified, Aneurysm of unspecified site [facility]), Anxiety, Arthritis, Attention deficit disorder with hyperactivity(314.01), Benign paroxysmal vertigo, BPH (benign prostatic hypertrophy), CAD (coronary artery disease), Carpal tunnel syndrome of right wrist (4/1/2019), Chronic prostatitis (11/15/2013), Colitis, enteritis, and gastroenteritis of presumed infectious origin, Depressive disorder, not elsewhere classified, Disorders of bursae and tendons in shoulder region, unspecified, Fatty liver, Former smoker, GERD (gastroesophageal reflux disease), History of tetanus, diphtheria, and acellular pertussis booster vaccination (Tdap), Hypercholesteremia, Hypertrophy of prostate with urinary obstruction and other lower urinary tract symptoms (LUTS) (11/15/2013), Impotence of organic origin, Inguinal hernia (2014), Insomnia, unspecified, Medial epicondylitis of elbow, Nonspecific elevation of levels of transaminase or lactic acid dehydrogenase (LDH), Orthostatic hypotension, Osteoporosis (11/15/2013), Other and unspecified hyperlipidemia, Pain of right thumb (4/1/2019), Sleep apnea, Tremor, Type II or unspecified type diabetes mellitus without mention of complication, not stated as uncontrolled, and Vertigo.   Mr. Madison Noel  has a past surgical history that includes hx mohs procedure (Right); hx turp (5251,1967); hx lumbar laminectomy (2/2012); pr cystoscopy,insert ureteral stent; hx hernia repair (Bilateral, 2/3/14); hx ptca (09/14/2017); hx heart catheterization (2017); ir emboli intracran lt (8/9/2019); hx vasectomy; hx urological; hx urological; hx orthopaedic (2010); hx knee arthroscopy (Right, 2000); hx shoulder arthroscopy (Right, 1995); hx heent; hx tonsillectomy; and ir emboli intracran lt (3/9/2020). Social History/Living Environment:     Pt is , his home has 10 stair steps to negotiate  Prior Level of Function/Work/Activity:  Walks 3 miles daily for fitness  Previous Treatment Approaches:          Lumbar laminectomy 2014  Current Medications:    Current Outpatient Medications:     metoprolol succinate (TOPROL-XL) 25 mg XL tablet, , Disp: , Rfl:     mupirocin (BACTROBAN) 2 % ointment, , Disp: , Rfl:     omeprazole (PRILOSEC) 20 mg capsule, TAKE 1 CAPSULE (20MG) BY ORAL ROUTE EVERY DAY, 30 MINUTES PRIOR TO BREAKFAST, Disp: , Rfl:     atorvastatin (LIPITOR) 40 mg tablet, Take 40 mg by mouth., Disp: , Rfl:     pregabalin (LYRICA) 50 mg capsule, Take 1 Capsule by mouth two (2) times a day. Max Daily Amount: 100 mg., Disp: 60 Capsule, Rfl: 2    ZINC PO, Take  by mouth., Disp: , Rfl:     POTASSIUM PO, Take  by mouth., Disp: , Rfl:     MELATONIN PO, Take  by mouth., Disp: , Rfl:     acetaminophen (TYLENOL) 500 mg tablet, Take  by mouth., Disp: , Rfl:     loperamide HCl (IMODIUM PO), Take  by mouth., Disp: , Rfl:     ASHWAGANDHA ROOT EXTRACT PO, Take  by mouth., Disp: , Rfl:     hyoscyamine (ANASPAZ, LEVSIN) 0.125 mg tablet, Take 0.125 mg by mouth., Disp: , Rfl:     dextroamphetamine-amphetamine (ADDERALL) 20 mg tablet, Take 20 mg by mouth two (2) times a day. (Patient not taking: Reported on 9/28/2021), Disp: , Rfl:     metoprolol tartrate (LOPRESSOR) 25 mg tablet, Take 25 mg by mouth two (2) times a day. (Patient not taking: Reported on 9/28/2021), Disp: , Rfl:     dextroamphetamine-amphetamine (ADDERALL) 5 mg tablet, Take 5 mg by mouth daily.  (Patient not taking: Reported on 9/28/2021), Disp: , Rfl:     famotidine (PEPCID) 40 mg tablet, Take 40 mg by mouth two (2) times a day., Disp: , Rfl:     MILK THISTLE PO, Take  by mouth., Disp: , Rfl:     ascorbic acid, vitamin C, (VITAMIN C) 500 mg tablet, Take 1,000 mg by mouth daily. , Disp: , Rfl:     metoprolol tartrate (LOPRESSOR) 25 mg tablet, Take 0.5 Tabs by mouth two (2) times a day. Indications: high blood pressure (Patient not taking: Reported on 9/28/2021), Disp: 30 Tab, Rfl: 5    atorvastatin (LIPITOR) 80 mg tablet, Take 1 Tab by mouth nightly. Indications: high cholesterol, Disp: 90 Tab, Rfl: 3    multivitamin (ONE A DAY) tablet, Take 1 Tab by mouth daily. , Disp: , Rfl:     DRYSOL 20 % external solution, JOSEFA AA AS DIRECTED FOR SWEATING (Patient not taking: Reported on 9/28/2021), Disp: , Rfl: 5    Vitamin B Complex No.12-Niacin 50 mg/15 mL liqd, Take  by mouth. (Patient not taking: Reported on 9/28/2021), Disp: , Rfl:     omega-3 fatty acids-fish oil 360-1,200 mg cap, 1,000 mg daily. , Disp: , Rfl:     aspirin delayed-release 81 mg tablet, Take 81 mg by mouth daily. , Disp: , Rfl:     cholecalciferol (VITAMIN D3) 1,000 unit cap, 2,000 Units daily. , Disp: , Rfl:     clonazePAM (KLONOPIN) 1 mg tablet, Take 0.5 mg by mouth nightly., Disp: , Rfl:     triamterene-hydroCHLOROthiazide (DYAZIDE) 37.5-25 mg per capsule, TK 1 C PO QD, Disp: , Rfl: 6    tadalafil (CIALIS) 5 mg tablet, Take 5 mg by mouth as needed. , Disp: , Rfl:     turmeric root extract 500 mg cap, 1 per mouth daily, Disp: , Rfl:     co-enzyme Q-10 (CO Q-10) 100 mg capsule, Take 200 mg by mouth daily. , Disp: , Rfl:    Date Last Reviewed:  11/18/2021   # of Personal Factors/Comorbidities that affect the Plan of Care: 0: LOW COMPLEXITY   EXAMINATION:   Observation/Orthostatic Postural Assessment:    Normal lumbar curve orientation while standing. Palpation:    Localized L SI joint line tenderness (milder R SI joint tenderness)  ROM:    Lumbar[de-identified] flexion = 40 deg (trunk flexion = 90 deg), extension = 22 deg (trunk = 31 deg): no pain reproduciton with either sing/repeated trunk flexion or trunk extension in standing    Hip:   Hip flexion : left = 130 deg, right = 128 deg.  Hip IR: left = 24 deg, right = 16 deg. ER: left = 42 eg, right = 40 deg. Hip extension: 20 deg each L/R. Strength:   Lower extremity myotomes: hip flexion 4+, knee extension 4+, Ankle dorsiflexion, eversion and EHL 5/5 bilat. Pt performs full range bridge, performs both left and right single leg bridges with effort. Able to abdominal brace. Special Tests:     Posterior pelvic (SIJ region) pain reproduced with HANNA test w/added overpressure (both L / R sides). Neurological Screen:  Absent Achilles, patellar DTR's. SLR = 45 deg L/R, muscular tension end feels each side. Slump test neg. L/R. Functional Mobility:   Pt performs supine-sit transfers independently. Balance:     Intact wide base standing balance. Body Structures Involved:  1. Nerves  2. Bones  3. Joints  4. Muscles  5. Ligaments Body Functions Affected:  1. Sensory/Pain  2. Neuromusculoskeletal  3. Movement Related Activities and Participation Affected:  1. General Tasks and Demands  2. Mobility  3. Self Care  4. Domestic Life  5. Interpersonal Interactions and Relationships  6. Community, Social and [place]   # of elements that affect the Plan of Care: 3: MODERATE COMPLEXITY   CLINICAL PRESENTATION:   Presentation: Stable and uncomplicated: LOW COMPLEXITY   CLINICAL DECISION MAKING:   Tool Used: Modified Oswestry Low Back Pain Questionnaire  Score:  Initial: 23/50  Most Recent: X/50 (Date: -- )   Interpretation of Score: Each section is scored on a 0-5 scale, 5 representing the greatest disability. The scores of each section are added together for a total score of 50. Medical Necessity:   · Patient is expected to demonstrate progress in strength, range of motion, balance and coordination  ·  to decrease pain and increase abilitiy/endurance with standing activities to allow normal performance of ADL's requiring stait cupright postures.    · .  Reason for Services/Other Comments:  · Patient has demonstrated an improvement in functional level by independent performance of HEP.    · .   Use of outcome tool(s) and clinical judgement create a POC that gives a: Clear prediction of patient's progress: LOW COMPLEXITY     Total Treatment Duration: 50 minutes  PT Patient Time In/Time Out  Time In: 1020  Time Out: [address], PT

## 2021-11-23 ENCOUNTER — HOSPITAL ENCOUNTER (OUTPATIENT)
Dept: PHYSICAL THERAPY | Age: 76
Discharge: HOME | End: 2021-11-23
Payer: MEDICARE

## 2021-11-23 ENCOUNTER — APPOINTMENT (OUTPATIENT)
Dept: PHYSICAL THERAPY | Age: 76
End: 2021-11-23
Payer: MEDICARE

## 2021-11-23 PROCEDURE — 97140 MANUAL THERAPY 1/> REGIONS: CPT

## 2021-11-23 PROCEDURE — 97110 THERAPEUTIC EXERCISES: CPT

## 2021-11-23 NOTE — PROGRESS NOTES
I am accessing Mr. Pretty's chart as a part of our department's internal chart auditing process. I certify that Mr. Erinn Charlton is, or was, a patient in our department.   Thank you,  Seema Mcpherson, PT, DPT  11/23/2021

## 2021-11-23 NOTE — PROGRESS NOTES
Julito Jeong. : 1945  [place]  Secondary: [place] [employer] [address]  [phone]   [phone]      OUTPATIENT PHYSICAL THERAPY: Daily Treatment Note  2021   Pain in right hip (M25.551) and Pain in left hip (M25.552) and Low back pain (M54.5) and Stiffness of unspecified joint, not elsewhere classified (M25.60)  MEDICAL/REFERRING DIAGNOSIS:  Low back pain, unspecified back pain laterality, unspecified chronicity, unspecified whether sciatica present [M54.50]  Sacroiliac joint pain [M53.3]  Spondylosis of lumbar region without myelopathy or radiculopathy [M47.816]    TREATMENT PLAN:  Effective Dates: 2021 TO 2022 (60 days). Frequency/Duration: 2 times a week for 60 Days  GOALS: (Goals have been discussed and agreed upon with patient.)  Short-Term Functional Goals: Time Frame: 4 weeks  1. Independent performance of home exercise program  2. Pt to demonstrate ability to stand/work at counter up to 15 minutes with minimal pain interruption  3. Improve hip abductor/extensor strength to 4+/5 with abiltiy to perform sit-stands while lifting 10# 3 x 10  Discharge Goals: Time Frame: 8 weeks;   1. Pt to demonstrate ability to stand/work at counter up to 20 minutes w/ minimal pain interruption  2. Improve NATE score to < 15/50 to reflect incraesd standing lifting sitting tolerance  3. Pt to demonstrate ability o to Sit : 1 hour without pain interruption. Rehabilitation Potential For Stated Goals: Good  _________________________________________________________________________  Pre-treatment Symptoms/Complaints:  Better, less pain, feels like the exercises are helping. Pain: Initial: 2-310 Post Session:  No increase/10   Medications Last Reviewed:  2021  Updated Objective Findings:  Below measures from initial evaluation unless otherwise noted. Observation/Orthostatic Postural Assessment:    Normal lumbar curve orientation while standing. Palpation:    Localized L SI joint line tenderness (milder R SI joint tenderness)  ROM:    Lumbar[de-identified] flexion = 40 deg (trunk flexion = 90 deg), extension = 22 deg (trunk = 31 deg): no pain reproduciton with either sing/repeated trunk flexion or trunk extension in standing     Hip:   Hip flexion : left = 130 deg, right = 128 deg. Hip IR: left = 24 deg, right = 16 deg. ER: left = 42 eg, right = 40 deg. Hip extension: 20 deg each L/R.      Strength:   Lower extremity myotomes: hip flexion 4+, knee extension 4+, Ankle dorsiflexion, eversion and EHL 5/5 bilat. Pt performs full range bridge, performs both left and right single leg bridges with effort. Able to abdominal brace. Special Tests:     Posterior pelvic (SIJ region) pain reproduced with HANNA test w/added overpressure (both L / R sides). Neurological Screen:  Absent Achilles, patellar DTR's. SLR = 45 deg L/R, muscular tension end feels each side. Slump test neg. L/R. Functional Mobility:   Pt performs supine-sit transfers independently. Balance:     Intact wide base standing balance. TREATMENT:   THERAPEUTIC ACTIVITY: ( see below for minutes): Therapeutic activities per grid below to improve mobility, strength, balance and coordination. Required minimal visual, verbal, manual and tactile cues to improve independence and safety with daily activities . THERAPEUTIC EXERCISE: (see below for minutes):  Exercises per grid below to improve mobility, strength, balance and coordination. Required minimal verbal and manual cues to promote proper body alignment, promote proper body posture and promote proper body mechanics. Progressed resistance, range, repetitions and complexity of movement as indicated.   MANUAL THERAPY: (see below for minutes): Joint mobilization and Soft tissue mobilization was utilized and necessary because of the patient's restricted joint motion, painful spasm, loss of articular motion and restricted motion of soft tissue. MODALITIES: (see below for minutes):      to decrease pain    SELF CARE: (see below for minutes): Procedure(s) (per grid) utilized to improve and/or restore self-care/home management as related to dressing, bathing and grooming. Required minimal verbal cueing to facilitate activities of daily living skills and compensatory activities. Date: 11/18/2021 (visit 1) 11.23.2021 (visit 2)      Modalities:                                Therapeutic Exercise: 15 min 25 min       Bridging: 15 x 5\" Lower trunk rotations x 4 min  Legs over physioball. Single leg bridgse : 5 x 15\" each L/R, alternating, with trunk rotation Bridges x 15 x 3\" . Clams: 30 each L/R Single brideg w/ trunk rotaitons: 10\" x 5 each L/R       Seated hamstring stretch: 2 x 30\" each L/R , alternating Assisted hamstring stretching: L/R 15\" x 4 each       Written program (see notes) Resisted clams L/R 30x w black band at knees. Proprioceptive Activities:                                Manual Therapy: 10 min 20 min       STM L/R SI joint lines : 6 min STM L/R SI joint lines 7 min each       L/ R SI short axis tractioning: 2 x 1 min each L/R L/R SI traction mobs: : short  Axis x 4 x 10\" each, and long axis 2 min LAD each      Therapeutic Activities:                                  HEP: Access Code: [email or web address]  URL: [email or web address]  Date: 11/18/2021  Prepared by: Elva Troy    Exercises  Supine Bridge - 2 x daily - 7 x weekly - 15 reps - 5 hold  Lumbar Rotation Unilateral Bridge - 2 x daily - 7 x weekly - 5 reps - 1 hold  Clamshell - 2 x daily - 7 x weekly - 30 reps  Seated Hamstring Stretch - 2 x daily - 7 x weekly - 2 sets - 30 seconds hold    [employer] [facility]  Treatment/Session Summary:    · Response to Treatment: Pt demonstrates very good early symptom control, proper performance of home exercise. · Communication/Consultation:  None today  · Equipment provided today:  None today  · Recommendations/Intent for next treatment session: Next visit will focus on SI joint mobilizatoin/self mobilization techniques, posterior hip strength/endurance and single leg stance stability, exercise conditioning to increase pain-free standing capacity, modalities PRN for pain modulation.  .    Total Treatment Billable Duration:  47 min     Renetta Torres, PT    Future Appointments   Date Time Provider Monique Gonzalez   11/23/2021 10:15 AM Shayna Coop, PT [facility]   11/30/2021 10:15 AM [facility]   12/2/2021 10:15 AM [facility]   12[facility]7/2021 10:15 AM [place] Coop, [facility]   12/9/2021 10:15 AM [place] Coop, PT [facility]   12/14/2021 10:15 AM [facility]op[facility]   12/16/2021 10:15 AM [facility] Coop, [facility]   12/21/2021 10:15 AM [place] Coop, PT [facility]   12/28/2021 10:15 AM Kings[facility] Leary[facility]

## 2021-11-30 ENCOUNTER — HOSPITAL ENCOUNTER (OUTPATIENT)
Dept: PHYSICAL THERAPY | Age: 76
Discharge: HOME | End: 2021-11-30
Payer: MEDICARE

## 2021-11-30 ENCOUNTER — APPOINTMENT (OUTPATIENT)
Dept: PHYSICAL THERAPY | Age: 76
End: 2021-11-30
Payer: MEDICARE

## 2021-11-30 PROCEDURE — 97140 MANUAL THERAPY 1/> REGIONS: CPT

## 2021-11-30 PROCEDURE — 97110 THERAPEUTIC EXERCISES: CPT

## 2021-11-30 NOTE — PROGRESS NOTES
Marleen Suarez. : 1945  [place]  Secondary: [place] [employer]  Ave @ [address]  [phone]   [phone]      OUTPATIENT PHYSICAL THERAPY: Daily Treatment Note  2021   Pain in right hip (M25.551) and Pain in left hip (M25.552) and Low back pain (M54.5) and Stiffness of unspecified joint, not elsewhere classified (M25.60)  MEDICAL/REFERRING DIAGNOSIS:  Low back pain, unspecified back pain laterality, unspecified chronicity, unspecified whether sciatica present [M54.50]  Sacroiliac joint pain [M53.3]  Spondylosis of lumbar region without myelopathy or radiculopathy [M47.816]    TREATMENT PLAN:  Effective Dates: 2021 TO 2022 (60 days). Frequency/Duration: 2 times a week for 60 Days  GOALS: (Goals have been discussed and agreed upon with patient.)  Short-Term Functional Goals: Time Frame: 4 weeks  1. Independent performance of home exercise program  2. Pt to demonstrate ability to stand/work at counter up to 15 minutes with minimal pain interruption  3. Improve hip abductor/extensor strength to 4+/5 with abiltiy to perform sit-stands while lifting 10# 3 x 10  Discharge Goals: Time Frame: 8 weeks;   1. Pt to demonstrate ability to stand/work at counter up to 20 minutes w/ minimal pain interruption  2. Improve NATE score to < 15/50 to reflect incraesd standing lifting sitting tolerance  3. Pt to demonstrate ability o to Sit : 1 hour without pain interruption. Rehabilitation Potential For Stated Goals: Good  _________________________________________________________________________  Pre-treatment Symptoms/Complaints:  Sore lower back today - was busy yesterday putting up [place] decorations at home.    Pain: Initial: 3/10 Post Session:  No increase/10   Medications Last Reviewed:  2021  Updated Objective Findings:  Below measures from initial evaluation unless otherwise noted. Observation/Orthostatic Postural Assessment:    Normal lumbar curve orientation while standing. Palpation:    Localized L SI joint line tenderness  ROM:    Lumbar[de-identified] flexion = 40 deg (trunk flexion = 90 deg), extension = 22 deg (trunk = 31 deg): no pain reproduciton with either sing/repeated trunk flexion or trunk extension in standing     Hip:   Hip flexion : left = 130 deg, right = 128 deg. Hip IR: left = 24 deg, right = 16 deg. ER: left = 42 eg, right = 40 deg. Hip extension: 20 deg each L/R.      Strength:   Lower extremity myotomes: hip flexion 4+, knee extension 4+, Ankle dorsiflexion, eversion and EHL 5/5 bilat. Pt performs full range bridge, performs both left and right single leg bridges with effort. Able to abdominal brace. Special Tests:     Posterior pelvic (SIJ region) pain reproduced with HANNA test w/added overpressure (both L / R sides). Neurological Screen:  Absent Achilles, patellar DTR's. SLR = 45 deg L/R, muscular tension end feels each side. Slump test neg. L/R. Functional Mobility:   Pt performs supine-sit transfers independently. Balance:     Intact wide base standing balance. TREATMENT:   THERAPEUTIC ACTIVITY: ( see below for minutes): Therapeutic activities per grid below to improve mobility, strength, balance and coordination. Required minimal visual, verbal, manual and tactile cues to improve independence and safety with daily activities . THERAPEUTIC EXERCISE: (see below for minutes):  Exercises per grid below to improve mobility, strength, balance and coordination. Required minimal verbal and manual cues to promote proper body alignment, promote proper body posture and promote proper body mechanics. Progressed resistance, range, repetitions and complexity of movement as indicated.   MANUAL THERAPY: (see below for minutes): Joint mobilization and Soft tissue mobilization was utilized and necessary because of the patient's restricted joint motion, painful spasm, loss of articular motion and restricted motion of soft tissue. MODALITIES: (see below for minutes):      to decrease pain    SELF CARE: (see below for minutes): Procedure(s) (per grid) utilized to improve and/or restore self-care/home management as related to dressing, bathing and grooming. Required minimal verbal cueing to facilitate activities of daily living skills and compensatory activities. Date: 11/18/2021 (visit 1) 11.23.2021 (visit 2) 11/30/2021 (visit 3)     Modalities:                                Therapeutic Exercise: 15 min 25 min 24 min      Bridging: 15 x 5\" Lower trunk rotations x 4 min  Legs over physioball. LTR's w [employer] ove rphysioball x 5 miin      Single leg bridgse : 5 x 15\" each L/R, alternating, with trunk rotation Bridges x 15 x 3\" . Bridges w/ legs over physioballx  20 x 5\"       Clams: 30 each L/R Single brideg w/ trunk rotaitons: 10\" x 5 each L/R Single leg bridges L/R alternating (w/trunk rotations): 4 x 10\" each side L/ R      Seated hamstring stretch: 2 x 30\" each L/R , alternating Assisted hamstring stretching: L/R 15\" x 4 each L/R side hip figure 4 stretching  X 2 min each      Written program (see notes) Resisted clams L/R 30x w black band at knees. L/R hamstring stretching  3 x 30\" each L/R. Proprioceptive Activities:                                Manual Therapy: 10 min 20 min 21 min      STM L/R SI joint lines : 6 min STM L/R SI joint lines 7 min each STM L/R SI joint lines 7 min each      L/ R SI short axis tractioning: 2 x 1 min each L/R L/R SI traction mobs: : short  Axis x 4 x 10\" each, and long axis 2 min LAD each L/R SI traction mobs: : short  Axis x 4 x 15\" each, and long axis 2 x 1  min LAD each     Therapeutic Activities:                                  HEP: Access Code: [email or web address]  URL: [email or web address]  Date: 11/18/2021  Prepared by: Rehan Ip    Exercises  Supine Bridge - 2 x daily - 7 x weekly - 15 reps - 5 hold  Lumbar Rotation Unilateral Bridge - 2 x daily - 7 x weekly - 5 reps - 1 hold  Clamshell - 2 x daily - 7 x weekly - 30 reps  Seated Hamstring Stretch - 2 x daily - 7 x weekly - 2 sets - 30 seconds hold    [employer] Portal  Treatment/Session Summary:    · Response to Treatment: Greater emphasis on manual therpay due to symptom flare -will progress back to SI stability program at next visit. · Communication/Consultation:  None today  · Equipment provided today:  None today  · Recommendations/Intent for next treatment session: Next visit will focus on SI joint mobilizatoin/self mobilization techniques, posterior hip strength/endurance and single leg stance stability, exercise conditioning to increase pain-free standing capacity, modalities PRN for pain modulation.  .    Total Treatment Billable Duration:  45 min  PT Patient Time In/Time Out  Time In: 1015  Time Out: [address], [place]    Future Appointments   Date Time Provider Monique Gonzalez   12/2/2021 10:15 AM Wyvonna Distance, PT [facility]   12/7/2021 10:15 AM Wyvonna Distance, PT [facility]   12/9/2021 10:15 AM Wyvonna Distance, PT [facility]   12/14/2021 10:15 AM Wyvonna Distance, PT [facility]   12/16/2021 10:15 AM Wyvonna Distance, PT [facility]   12/21/2021 10:15 AM Wyvonna Distance, PT [facility]   12/28/2021 10:15 AM [facility] Conklin, [facility]

## 2021-12-02 ENCOUNTER — HOSPITAL ENCOUNTER (OUTPATIENT)
Dept: PHYSICAL THERAPY | Age: 76
Discharge: HOME | End: 2021-12-02
Payer: MEDICARE

## 2021-12-02 PROCEDURE — 97140 MANUAL THERAPY 1/> REGIONS: CPT

## 2021-12-02 PROCEDURE — 97110 THERAPEUTIC EXERCISES: CPT

## 2021-12-02 NOTE — PROGRESS NOTES
[facility]. : 1945  [place]  Secondary: [place] [employer] [id number]  [address] @ [address] [place].  [phone]   [phone]      OUTPATIENT PHYSICAL THERAPY: Daily Treatment Note  2021   Pain in right hip (M25.551) and Pain in left hip (M25.552) and Low back pain (M54.5) and Stiffness of unspecified joint, not elsewhere classified (M25.60)  MEDICAL/REFERRING DIAGNOSIS:  Low back pain, unspecified back pain laterality, unspecified chronicity, unspecified whether sciatica present [M54.50]  Sacroiliac joint pain [M53.3]  Spondylosis of lumbar region without myelopathy or radiculopathy [M47.816]    TREATMENT PLAN:  Effective Dates: 2021 TO 2022 (60 days). Frequency/Duration: 2 times a week for 60 Days  GOALS: (Goals have been discussed and agreed upon with patient.)  Short-Term Functional Goals: Time Frame: 4 weeks  1. Independent performance of home exercise program  2. Pt to demonstrate ability to stand/work at counter up to 15 minutes with minimal pain interruption  3. Improve hip abductor/extensor strength to 4+/5 with abiltiy to perform sit-stands while lifting 10# 3 x 10  Discharge Goals: Time Frame: 8 weeks;   1. Pt to demonstrate ability to stand/work at counter up to 20 minutes w/ minimal pain interruption  2. Improve NATE score to < 15/50 to reflect incraesd standing lifting sitting tolerance  3. Pt to demonstrate ability o to Sit : 1 hour without pain interruption. Rehabilitation Potential For Stated Goals: Good  _________________________________________________________________________  Pre-treatment Symptoms/Complaints:  Back pain is less today, but has continued to be a bit flared by recent work around home.    Pain: Initial: 3/10 Post Session:  No increase/10   Medications Last Reviewed:  2021  Updated Objective Findings:  Below measures from initial evaluation unless otherwise noted. Observation/Orthostatic Postural Assessment:    Normal lumbar curve orientation while standing. Palpation:    Localized L SI joint line tenderness  ROM:    Lumbar[de-identified] flexion = 40 deg (trunk flexion = 90 deg), extension = 22 deg (trunk = 31 deg): no pain reproduciton with either sing/repeated trunk flexion or trunk extension in standing     Hip:   Hip flexion : left = 130 deg, right = 128 deg. Hip IR: left = 24 deg, right = 16 deg. ER: left = 42 deg, right = 40 deg. Hip extension: 20 deg each L/R. Hamstring lengths (SLR): left and right both moderately limited, ~ 50 deg SLR's.      Strength:   Lower extremity myotomes: hip flexion 4+, knee extension 4+, Ankle dorsiflexion, eversion and EHL 5/5 bilat. Pt performs full range bridge, performs both left and right single leg bridges with effort. Able to abdominal brace. Special Tests:     Posterior pelvic (SIJ region) pain reproduced with HANNA test w/added overpressure (both L / R sides). Neurological Screen:  Absent Achilles, patellar DTR's. SLR = 45 deg L/R, muscular tension end feels each side. Slump test neg. L/R. Functional Mobility:   Pt performs supine-sit transfers independently. Balance:     Intact wide base standing balance. TREATMENT:   THERAPEUTIC ACTIVITY: ( see below for minutes): Therapeutic activities per grid below to improve mobility, strength, balance and coordination. Required minimal visual, verbal, manual and tactile cues to improve independence and safety with daily activities . THERAPEUTIC EXERCISE: (see below for minutes):  Exercises per grid below to improve mobility, strength, balance and coordination. Required minimal verbal and manual cues to promote proper body alignment, promote proper body posture and promote proper body mechanics. Progressed resistance, range, repetitions and complexity of movement as indicated.   MANUAL THERAPY: (see below for minutes): Joint mobilization and Soft tissue mobilization was utilized and necessary because of the patient's restricted joint motion, painful spasm, loss of articular motion and restricted motion of soft tissue. MODALITIES: (see below for minutes):      to decrease pain    SELF CARE: (see below for minutes): Procedure(s) (per grid) utilized to improve and/or restore self-care/home management as related to dressing, bathing and grooming. Required minimal verbal cueing to facilitate activities of daily living skills and compensatory activities. Date: 11/18/2021 (visit 1) 11.23.2021 (visit 2) 11/30/2021 (visit 3) 12/2/2021 (visit 4)    Modalities:                                Therapeutic Exercise: 15 min 25 min 24 min 25 min     Bridging: 15 x 5\" Lower trunk rotations x 4 min  Legs over physioball. LTR's w Michae Hammock ove rphysioball x 5 miin LTR's w /legs over ball: 4 min     Single leg bridgse : 5 x 15\" each L/R, alternating, with trunk rotation Bridges x 15 x 3\" . Bridges w/ legs over physioballx  20 x 5\"  Bridges w/ legs over physioball x 30     Clams: 30 each L/R Single brideg w/ trunk rotaitons: 10\" x 5 each L/R Single leg bridges L/R alternating (w/trunk rotations): 4 x 10\" each side L/ R L/R hamstring stretches w/15\"holds, SLR's and flexed hip knee extn: 5 min     Seated hamstring stretch: 2 x 30\" each L/R , alternating Assisted hamstring stretching: L/R 15\" x 4 each L/R side hip figure 4 stretching  X 2 min each Single leg bridges L/R alternating (w/trunk rotations): 2 x 10\" each side L/ R     Written program (see notes) Resisted clams L/R 30x w black band at knees. L/R hamstring stretching  3 x 30\" each L/R. Shorty stretch: 2 min each L/R        Seated abdominal isometrics (hip flexor isometrics): 5 x 10\" each L/R.      Proprioceptive Activities:                                Manual Therapy: 10 min 20 min 21 min 15 min     STM L/R SI joint lines : 6 min STM L/R SI joint lines 7 min each STM L/R SI joint lines 7 min each STM L/R SI joint lines x 10 min        Low lumbar pa mobs gr. 3-4: 2 min     L/ R SI short axis tractioning: 2 x 1 min each L/R L/R SI traction mobs: : short  Axis x 4 x 10\" each, and long axis 2 min LAD each L/R SI traction mobs: : short  Axis x 4 x 15\" each, and long axis 2 x 1  min LAD each Repeat    Therapeutic Activities:                                  HEP: Access Code: [email or web address]  URL: [email or web address]  Date: 11/18/2021  Prepared by: Elva Troy    Exercises  Supine Bridge - 2 x daily - 7 x weekly - 15 reps - 5 hold  Lumbar Rotation Unilateral Bridge - 2 x daily - 7 x weekly - 5 reps - 1 hold  Clamshell - 2 x daily - 7 x weekly - 30 reps  Seated Hamstring Stretch - 2 x daily - 7 x weekly - 2 sets - 30 seconds hold    [employer]OL[facility]  Treatment/Session Summary:    · Response to Treatment: Static standing continues to be pt's greatest symptoms reproducer at this point - may benefit from trial of pelvic traction. · Communication/Consultation:  None today  · Equipment provided today:  None today  · Recommendations/Intent for next treatment session: Next visit will focus on SI joint mobilizatoin/self mobilization techniques, posterior hip strength/endurance and single leg stance stability, exercise conditioning to increase pain-free standing capacity, modalities PRN for pain modulation.  Pelvic traction trial.     Total Treatment Billable Duration:  40 min  PT Patient Time In/Time Out  Time In: 1018  Time Out: [address], PT    Future Appointments   Date Time Provider Monique Gonzalez   12/7/2021 10:15 AM Sukumar Darby, PT [facility]   12/9/2021 10:15 AM [facility]   12/14/2021 10:15 AM [facility]ukumar Darby, [facility]   12/16/2021 10:15 AM [facility]umar Darby, [facility]   12/21/2021 10:15 AM [facility]ukumar Darby, [facility]   12/28/2021 10:15 AM Mike Raygoza, PT [facility]

## 2021-12-07 ENCOUNTER — HOSPITAL ENCOUNTER (OUTPATIENT)
Dept: PHYSICAL THERAPY | Age: 76
Discharge: HOME | End: 2021-12-07
Payer: MEDICARE

## 2021-12-07 PROCEDURE — 97110 THERAPEUTIC EXERCISES: CPT

## 2021-12-07 PROCEDURE — 97014 ELECTRIC STIMULATION THERAPY: CPT

## 2021-12-07 PROCEDURE — 97140 MANUAL THERAPY 1/> REGIONS: CPT

## 2021-12-07 NOTE — PROGRESS NOTES
John Chakraborty. : 1945  [place]  [place] [employer] [address]  [phone]   [phone]      OUTPATIENT PHYSICAL THERAPY: Daily Treatment Note  2021   Pain in right hip (M25.551) and Pain in left hip (M25.552) and Low back pain (M54.5) and Stiffness of unspecified joint, not elsewhere classified (M25.60)  MEDICAL/REFERRING DIAGNOSIS:  Low back pain, unspecified back pain laterality, unspecified chronicity, unspecified whether sciatica present [M54.50]  Sacroiliac joint pain [M53.3]  Spondylosis of lumbar region without myelopathy or radiculopathy [M47.816]    TREATMENT PLAN:  Effective Dates: 2021 TO 2022 (60 days). Frequency/Duration: 2 times a week for 60 Days  GOALS: (Goals have been discussed and agreed upon with patient.)  Short-Term Functional Goals: Time Frame: 4 weeks  1. Independent performance of home exercise program  2. Pt to demonstrate ability to stand/work at counter up to 15 minutes with minimal pain interruption  3. Improve hip abductor/extensor strength to 4+/5 with abiltiy to perform sit-stands while lifting 10# 3 x 10  Discharge Goals: Time Frame: 8 weeks;   1. Pt to demonstrate ability to stand/work at counter up to 20 minutes w/ minimal pain interruption  2. Improve NATE score to < 15/50 to reflect incraesd standing lifting sitting tolerance  3. Pt to demonstrate ability o to Sit : 1 hour without pain interruption. Rehabilitation Potential For Stated Goals: Good  _________________________________________________________________________  Pre-treatment Symptoms/Complaints:  Back sore from hanging [place] ornaments. Pain: Initial: 6/10 Post Session:  No increase/10   Medications Last Reviewed:  2021  Updated Objective Findings:  Below measures from initial evaluation unless otherwise noted.    Observation/Orthostatic Postural Assessment:    Normal lumbar curve orientation while standing. Palpation:    Localized L SI joint line tenderness  ROM:    Lumbar[de-identified] flexion = 40 deg (trunk flexion = 90 deg), extension = 22 deg (trunk = 31 deg): no pain reproduciton with either sing/repeated trunk flexion or trunk extension in standing     Hip:   Hip flexion : left = 130 deg, right = 128 deg. Hip IR: left = 24 deg, right = 16 deg. ER: left = 42 deg, right = 40 deg. Hip extension: 20 deg each L/R. Hamstring lengths (SLR): left and right both moderately limited, ~ 50 deg SLR's.      Strength:   Lower extremity myotomes: hip flexion 4+, knee extension 4+, Ankle dorsiflexion, eversion and EHL 5/5 bilat. Pt performs full range bridge, performs both left and right single leg bridges with effort. Able to abdominal brace. Special Tests:     Posterior pelvic (SIJ region) pain reproduced with HANNA test w/added overpressure (both L / R sides). Neurological Screen:  Absent Achilles, patellar DTR's. SLR = 45 deg L/R, muscular tension end feels each side. Slump test neg. L/R. Functional Mobility:   Pt performs supine-sit transfers independently. Balance:     Intact wide base standing balance. TREATMENT:   THERAPEUTIC ACTIVITY: ( see below for minutes): Therapeutic activities per grid below to improve mobility, strength, balance and coordination. Required minimal visual, verbal, manual and tactile cues to improve independence and safety with daily activities . THERAPEUTIC EXERCISE: (see below for minutes):  Exercises per grid below to improve mobility, strength, balance and coordination. Required minimal verbal and manual cues to promote proper body alignment, promote proper body posture and promote proper body mechanics. Progressed resistance, range, repetitions and complexity of movement as indicated.   MANUAL THERAPY: (see below for minutes): Joint mobilization and Soft tissue mobilization was utilized and necessary because of the patient's restricted joint motion, painful spasm, loss of articular motion and restricted motion of soft tissue. MODALITIES: (see below for minutes):      to decrease pain    SELF CARE: (see below for minutes): Procedure(s) (per grid) utilized to improve and/or restore self-care/home management as related to dressing, bathing and grooming. Required minimal verbal cueing to facilitate activities of daily living skills and compensatory activities. Date: 11/18/2021 (visit 1) 11.23.2021 (visit 2) 11/30/2021 (visit 3) 12/2/2021 (visit 4) 12/7/2021 (visit 5)   Modalities:     15 min        Lumbar IFC/moinst heat: 15 min,  pps, ~ 10 mA. Therapeutic Exercise: 15 min 25 min 24 min 25 min 26 min    Bridging: 15 x 5\" Lower trunk rotations x 4 min  Legs over physioball. LTR's w [employer] ove rphysioball x 5 miin LTR's w /legs over ball: 4 min Repeat    Single leg bridgse : 5 x 15\" each L/R, alternating, with trunk rotation Bridges x 15 x 3\" . Bridges w/ legs over physioballx  20 x 5\"  Bridges w/ legs over physioball x 30 Repeat    Clams: 30 each L/R Single brideg w/ trunk rotaitons: 10\" x 5 each L/R Single leg bridges L/R alternating (w/trunk rotations): 4 x 10\" each side L/ R L/R hamstring stretches w/15\"holds, SLR's and flexed hip knee extn: 5 min Clams: L/R x 30 each, black band around knees. Seated hamstring stretch: 2 x 30\" each L/R , alternating Assisted hamstring stretching: L/R 15\" x 4 each L/R side hip figure 4 stretching  X 2 min each Single leg bridges L/R alternating (w/trunk rotations): 2 x 10\" each side L/ R Double leg raises /lowers 20 reps    Written program (see notes) Resisted clams L/R 30x w black band at knees. L/R hamstring stretching  3 x 30\" each L/R. Shorty stretch: 2 min each L/R Shorty stretch: 2 min each L/R       Seated abdominal isometrics (hip flexor isometrics): 5 x 10\" each L/R.   Standing lumbar extensions: 10 x 5\"    Proprioceptive Activities: Manual Therapy: 10 min 20 min 21 min 15 min 16 min    STM L/R SI joint lines : 6 min STM L/R SI joint lines 7 min each STM L/R SI joint lines 7 min each STM L/R SI joint lines x 10 min STM L/R SI joint lines x 10 min       Low lumbar pa mobs gr. 3-4: 2 min Low lumbar pa mobs gr. 3-4: 2 min    L/ R SI short axis tractioning: 2 x 1 min each L/R L/R SI traction mobs: : short  Axis x 4 x 10\" each, and long axis 2 min LAD each L/R SI traction mobs: : short  Axis x 4 x 15\" each, and long axis 2 x 1  min LAD each Repeat Lower lumbar rotation  Mobs gr. 4 L/R (4 min)   Therapeutic Activities:                                  HEP: Standing lumbar extensions: 10 x 5\", 4x/day, especially when back is painful    [employer] Portal  Treatment/Session Summary:    · Response to Treatment:  HEP revised - good response to lumbar extension, will use extension exercise to attemtpt to better modulate day to day pain. · Communication/Consultation:  None today  · Equipment provided today:  None today  · Recommendations/Intent for next treatment session: Next visit will focus on SI joint mobilizatoin/self mobilization techniques, posterior hip strength/endurance and single leg stance stability, exercise conditioning to increase pain-free standing capacity, modalities PRN for pain modulation.  Pelvic traction trial.     Total Treatment Billable Duration:  57 min  PT Patient Time In/Time Out  Time In: 1017  Time Out: [facility]    Future Appointments   Date Time Provider Monique Gonzalez   12/9/2021 10:15 AM Clpaul Ocampo, PT [facility]   12/14/2021 10:15 AM Clista Terrence, PT [facility]   12/16/2021 10:15 AM Bia Terrence, PT [facility]   12/21/2021 10:15 AM Clista Terrence, PT [facility]   12/28/2021 10:15 AM [facility]

## 2021-12-09 ENCOUNTER — HOSPITAL ENCOUNTER (OUTPATIENT)
Dept: PHYSICAL THERAPY | Age: 76
Discharge: HOME | End: 2021-12-09
Payer: MEDICARE

## 2021-12-09 PROCEDURE — 97140 MANUAL THERAPY 1/> REGIONS: CPT

## 2021-12-09 PROCEDURE — 97110 THERAPEUTIC EXERCISES: CPT

## 2021-12-09 PROCEDURE — 97014 ELECTRIC STIMULATION THERAPY: CPT

## 2021-12-09 NOTE — PROGRESS NOTES
Mack Boas. : 1945  [place]  Secondary: [place] [employer] [address]  [phone]   [phone]      OUTPATIENT PHYSICAL THERAPY: Daily Treatment Note  2021   Pain in right hip (M25.551) and Pain in left hip (M25.552) and Low back pain (M54.5) and Stiffness of unspecified joint, not elsewhere classified (M25.60)  MEDICAL/REFERRING DIAGNOSIS:  Low back pain, unspecified back pain laterality, unspecified chronicity, unspecified whether sciatica present [M54.50]  Sacroiliac joint pain [M53.3]  Spondylosis of lumbar region without myelopathy or radiculopathy [M47.816]    TREATMENT PLAN:  Effective Dates: 2021 TO 2022 (60 days). Frequency/Duration: 2 times a week for 60 Days  GOALS: (Goals have been discussed and agreed upon with patient.)  Short-Term Functional Goals: Time Frame: 4 weeks  1. Independent performance of home exercise program  2. Pt to demonstrate ability to stand/work at counter up to 15 minutes with minimal pain interruption  3. Improve hip abductor/extensor strength to 4+/5 with abiltiy to perform sit-stands while lifting 10# 3 x 10  Discharge Goals: Time Frame: 8 weeks;   1. Pt to demonstrate ability to stand/work at counter up to 20 minutes w/ minimal pain interruption  2. Improve NATE score to < 15/50 to reflect incraesd standing lifting sitting tolerance  3. Pt to demonstrate ability o to Sit : 1 hour without pain interruption. Rehabilitation Potential For Stated Goals: Good  _________________________________________________________________________  Pre-treatment Symptoms/Complaints:  Pt experiencing bilateral posterior hip soreness more than back pain today - related to exercise.     Pain: Initial: 5/10 Post Session:  No increase/10   Medications Last Reviewed:  2021  Updated Objective Findings:  Below measures from initial evaluation unless otherwise noted. Observation/Orthostatic Postural Assessment:    Normal lumbar curve orientation while standing. Palpation:    Localized L SI joint line tenderness  ROM:    Lumbar[de-identified] flexion = 40 deg (trunk flexion = 90 deg), extension = 22 deg (trunk = 31 deg): no pain reproduciton with either sing/repeated trunk flexion or trunk extension in standing     Hip:   Hip flexion : left = 130 deg, right = 128 deg. Hip IR: left = 24 deg, right = 16 deg. ER: left = 60 deg, right = 60 deg. Hip extension: 20 deg each L/R. Hamstring lengths (SLR): left and right both moderately limited, ~ 50 deg SLR's.      Strength:   Lower extremity myotomes: hip flexion 4+, knee extension 4+, Ankle dorsiflexion, eversion and EHL 5/5 bilat. Pt performs full range bridge, performs both left and right single leg bridges with effort. Able to abdominal brace. Special Tests:     Posterior pelvic (SIJ region) pain reproduced with HANNA test w/added overpressure (both L / R sides). Neurological Screen:  Absent Achilles, patellar DTR's. SLR = 45 deg L/R, muscular tension end feels each side. Slump test neg. L/R. Functional Mobility:   Pt performs supine-sit transfers independently. Balance:     Intact wide base standing balance. TREATMENT:   THERAPEUTIC ACTIVITY: ( see below for minutes): Therapeutic activities per grid below to improve mobility, strength, balance and coordination. Required minimal visual, verbal, manual and tactile cues to improve independence and safety with daily activities . THERAPEUTIC EXERCISE: (see below for minutes):  Exercises per grid below to improve mobility, strength, balance and coordination. Required minimal verbal and manual cues to promote proper body alignment, promote proper body posture and promote proper body mechanics. Progressed resistance, range, repetitions and complexity of movement as indicated.   MANUAL THERAPY: (see below for minutes): Joint mobilization and Soft tissue mobilization was utilized and necessary because of the patient's restricted joint motion, painful spasm, loss of articular motion and restricted motion of soft tissue. MODALITIES: (see below for minutes):      to decrease pain    SELF CARE: (see below for minutes): Procedure(s) (per grid) utilized to improve and/or restore self-care/home management as related to dressing, bathing and grooming. Required minimal verbal cueing to facilitate activities of daily living skills and compensatory activities. Date: 2021 (visit 6)       Modalities: 15 min        Lumbar IFC/moinst heat: 15 min,  pps, ~ 10 mA. Therapeutic Exercise: 26 min        LTR's w /legs over ball: 4 min        Single leg bridgse : 5 x  each L/R, alternating, with trunk rotation        Bridges w/ legs over physioballx  20 x 5\"         Seated hamstring stretch: 2 x 30\" each L/R , alternating        L/R side hip figure 4 and piriformis stretching  X 3 min each        Standing lumbar extensions: 12 x 5\"        Proprioceptive Activities:                                Manual Therapy: 18 min        STM L/R SI joint lines : 12 min        Low lumbar pa mobs gr. 3-4: 2 min        L/ R SI long axis tractionin min each L/R       Therapeutic Activities:                                  HEP: Standing lumbar extensions: 10 x 5\", 4x/day, especially when back is painful    [facility] Portal  Treatment/Session Summary:    · Response to Treatment:  HMild change in treatment emphasis today to address posterio hip soreness. Pt continuing on extension directional preference exercise, no adverse response to this thus far - plan to start prone over pillow paraspinal training nexxt.    · Communication/Consultation:  None today  · Equipment provided today:  None today   · Recommendations/Intent for next treatment session: Next visit will focus on SI joint mobilizatoin/self mobilization techniques, posterior hip strength/endurance and single leg stance stability, exercise conditioning to increase pain-free standing capacity, modalities PRN for pain modulation.    Total Treatment Billable Duration:  59 min  PT Patient Time In/Time Out  Time In: 9103  Time Out: 2924 [facility], PT    Future Appointments   Date Time Provider Monique Gonzalez   12/14/2021 10:15 AM Sukumar Darby, PT [facility]   12/16/2021 10:15 AM Sukumar Darby, PT [facility]   12/21/2021 10:15 AM Sukumar Darby, PT [facility]   12/28/2021 10:15 AM Mike Raygoza, PT SFO[facility]

## 2021-12-14 ENCOUNTER — HOSPITAL ENCOUNTER (OUTPATIENT)
Dept: PHYSICAL THERAPY | Age: 76
Discharge: HOME | End: 2021-12-14
Payer: MEDICARE

## 2021-12-14 PROCEDURE — 97140 MANUAL THERAPY 1/> REGIONS: CPT

## 2021-12-14 PROCEDURE — 97110 THERAPEUTIC EXERCISES: CPT

## 2021-12-14 NOTE — PROGRESS NOTES
John Chakraborty. : 1945  [place]  Secondary: [place] [employer] [id number] @ [address] [place] [address]  [phone]   [phone]      OUTPATIENT PHYSICAL THERAPY: Daily Treatment Note  2021   Pain in right hip (M25.551) and Pain in left hip (M25.552) and Low back pain (M54.5) and Stiffness of unspecified joint, not elsewhere classified (M25.60)  MEDICAL/REFERRING DIAGNOSIS:  Low back pain, unspecified back pain laterality, unspecified chronicity, unspecified whether sciatica present [M54.50]  Sacroiliac joint pain [M53.3]  Spondylosis of lumbar region without myelopathy or radiculopathy [M47.816]    TREATMENT PLAN:  Effective Dates: 2021 TO 2022 (60 days). Frequency/Duration: 2 times a week for 60 Days  GOALS: (Goals have been discussed and agreed upon with patient.)  Short-Term Functional Goals: Time Frame: 4 weeks  1. Independent performance of home exercise program  2. Pt to demonstrate ability to stand/work at counter up to 15 minutes with minimal pain interruption  3. Improve hip abductor/extensor strength to 4+/5 with abiltiy to perform sit-stands while lifting 10# 3 x 10  Discharge Goals: Time Frame: 8 weeks;   1. Pt to demonstrate ability to stand/work at counter up to 20 minutes w/ minimal pain interruption  2. Improve NATE score to < 15/50 to reflect incraesd standing lifting sitting tolerance  3. Pt to demonstrate ability o to Sit : 1 hour without pain interruption. Rehabilitation Potential For Stated Goals: Good  _________________________________________________________________________  Pre-treatment Symptoms/Complaints:  Pt reporting difficulty with static standing by end of day - not much change since starting PT.    Pain: Initial: 5/10 Post Session:  No increase/10   Medications Last Reviewed:  2021  Updated Objective Findings:  Below measures from initial evaluation unless otherwise noted. Observation/Orthostatic Postural Assessment:    Normal lumbar curve orientation while standing. Palpation:    Localized L SI joint line tenderness  ROM:    Lumbar[de-identified] flexion = 40 deg (trunk flexion = 90 deg), extension = 22 deg (trunk = 31 deg): no pain reproduciton with either sing/repeated trunk flexion or trunk extension in standing     Hip:   Hip flexion : left = 130 deg, right = 128 deg. Hip IR: left = 24 deg, right = 16 deg. ER: left = 60 deg, right = 60 deg. Hip extension: 20 deg each L/R. Hamstring lengths (SLR): left and right both moderately limited, ~ 50 deg SLR's.      Strength:   Lower extremity myotomes: hip flexion 4+, knee extension 4+, Ankle dorsiflexion, eversion and EHL 5/5 bilat. Pt performs full range bridge, performs both left and right single leg bridges with effort. Able to abdominal brace. Special Tests:     Posterior pelvic (SIJ region) pain reproduced with HANNA test w/added overpressure (both L / R sides). Neurological Screen:  Absent Achilles, patellar DTR's. SLR = 45 deg L/R, muscular tension end feels each side. Slump test neg. L/R. Functional Mobility:   Pt performs supine-sit transfers independently. Balance:     Intact wide base standing balance. TREATMENT:   THERAPEUTIC ACTIVITY: ( see below for minutes): Therapeutic activities per grid below to improve mobility, strength, balance and coordination. Required minimal visual, verbal, manual and tactile cues to improve independence and safety with daily activities . THERAPEUTIC EXERCISE: (see below for minutes):  Exercises per grid below to improve mobility, strength, balance and coordination. Required minimal verbal and manual cues to promote proper body alignment, promote proper body posture and promote proper body mechanics. Progressed resistance, range, repetitions and complexity of movement as indicated.   MANUAL THERAPY: (see below for minutes): Joint mobilization and Soft tissue mobilization was utilized and necessary because of the patient's restricted joint motion, painful spasm, loss of articular motion and restricted motion of soft tissue. MODALITIES: (see below for minutes):      to decrease pain    SELF CARE: (see below for minutes): Procedure(s) (per grid) utilized to improve and/or restore self-care/home management as related to dressing, bathing and grooming. Required minimal verbal cueing to facilitate activities of daily living skills and compensatory activities. Date: 2021 (visit 6) 2021 (visit 7)      Modalities: 15 min        Lumbar IFC/moinst heat: 15 min,  pps, ~ 10 mA. Therapeutic Exercise: 26 min 27 min       LTR's w /legs over ball: 4 min LTR\"s : legs over physioball: 4 min       Single leg bridgse : 5 x  each L/R, alternating, with trunk rotation SB hamstring curls w/ 10\" holds x 10 for lumbar flexion       Bridges w/ legs over physioballx  20 x 5\"  Bridges w/ holds: 20 x 5\"        Seated hamstring stretch: 2 x 30\" each L/R , alternating Supine hamstring stretchin\" x 4 each L/R.         L/R side hip figure 4 and piriformis stretching  X 3 min each        Standing lumbar extensions: 12 x 5\"        Proprioceptive Activities:                                Manual Therapy: 18 min 18 min       STM L/R SI joint lines : 12 min STM;: L/R SI  and lumbar paraspinal: 10 min       Low lumbar pa mobs gr. 3-4: 2 min Low lumbar pa mobs gr. 3-4: 2 min       L/ R SI long axis tractionin min each L/R L/R LE tractioning: Short leg 2 x 20\", long leg 2 min = each (L/R)      Therapeutic Activities:                                  HEP: Standing lumbar extensions: 10 x 5\", 4x/day, especially when back is painful    [facility] Portal  Treatment/Session Summary:    · Response to Treatment: Limited response to therapy - discussed options with pt regarding PT - plan at this point is to try to modulate symptoms better over the next few visits then transition to HEP at time of discharge later this month. · Communication/Consultation:  None today  · Equipment provided today:  None today   · Recommendations/Intent for next treatment session: Next visit will focus on  posterior hip strength/endurance and single leg stance stability, exercise conditioning to increase pain-free standing capacity, modalities PRN for pain modulation.    Total Treatment Billable Duration: 45 min  PT Patient Time In/Time Out  Time In: 3868  Time Out: Brittany 30 Rosanna Duval PT    Future Appointments   Date Time Provider Monique Gonzalez   12/16/2021 10:15 AM Oanh Hooks, DEANDRA [facility]   12/21/2021 10:15 AM Oanh Hooks, PT [facility]   12/28/2021 10:15 AM Maksim Irving PT [facility]

## 2021-12-16 ENCOUNTER — HOSPITAL ENCOUNTER (OUTPATIENT)
Dept: PHYSICAL THERAPY | Age: 76
Discharge: HOME | End: 2021-12-16
Payer: MEDICARE

## 2021-12-16 PROCEDURE — 97140 MANUAL THERAPY 1/> REGIONS: CPT

## 2021-12-16 PROCEDURE — 97110 THERAPEUTIC EXERCISES: CPT

## 2021-12-16 NOTE — PROGRESS NOTES
Adri Montano. 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 Frequency/Duration: 2 times a week for 60 Days  GOALS: (Goals have been discussed and agreed upon with patient.)  Short-Term Functional Goals: Time Frame: 4 weeks  1. Independent performance of home exercise program (MET)  2. Pt to demonstrate ability to stand/work at counter up to 15 minutes with minimal pain interruption Not met)  3. Improve hip abductor/extensor strength to 4+/5 with abiltiy to perform sit-stands while lifting 10# 3 x 10 (Partially met)  Discharge Goals: Time Frame: 8 weeks;   1. Pt to demonstrate ability to stand/work at counter up to 20 minutes w/ minimal pain interruption  2. Improve NATE score to < 15/50 to reflect incraesd standing lifting sitting tolerance  3. Pt to demonstrate ability o to Sit : 1 hour without pain interruption. Rehabilitation Potential For Stated Goals: Good  _________________________________________________________________________  Pre-treatment Symptoms/Complaints:  \" Not much different\". Less back pain today as he's not been as busy with home errands.    Pain: Initial: 5/10 Post Session:  No increase/10   Medications Last Reviewed:  2021  Updated Objective Findings:  Below measures from initial evaluation unless otherwise noted. Observation/Orthostatic Postural Assessment:    Normal lumbar curve orientation while standing. Palpation:    No SI joint line or lumbar paraspinal tendernss. ROM:    Lumbar[de-identified] flexion = 40 deg (trunk flexion = 90 deg), extension = 22 deg (trunk = 31 deg): no pain reproduciton with either sing/repeated trunk flexion or trunk extension in standing     Hip:   Hip flexion : left = 130 deg, right = 128 deg. Hip IR: left = 24 deg, right = 16 deg. ER: left = 60 deg, right = 60 deg. Hip extension: 20 deg each L/R. Hamstring lengths (SLR): left and right both moderately limited, ~ 50 deg SLR's.      Strength:   Lower extremity myotomes: hip flexion 4+, knee extension 4+, Ankle dorsiflexion, eversion and EHL 5/5 bilat. Pt performs full range bridge, performs both left and right single leg bridges with effort. Able to abdominal brace and maintain neutral spine alignment during bridging as well as during supine leg lowering. [place] Special Tests:     Posterior pelvic (SIJ region) pain reproduced with HANNA test w/added overpressure (both L / R sides). Neurological Screen:  Absent Achilles, patellar DTR's. SLR = 45 deg L/R, muscular tension end feels each side. Slump test neg. L/R. Functional Mobility:   Pt performs supine-sit transfers independently. Balance:     Intact wide base standing balance. TREATMENT:   THERAPEUTIC ACTIVITY: ( see below for minutes): Therapeutic activities per grid below to improve mobility, strength, balance and coordination. Required minimal visual, verbal, manual and tactile cues to improve independence and safety with daily activities . THERAPEUTIC EXERCISE: (see below for minutes):  Exercises per grid below to improve mobility, strength, balance and coordination. Required minimal verbal and manual cues to promote proper body alignment, promote proper body posture and promote proper body mechanics.   Progressed resistance, range, repetitions and complexity of movement as indicated. MANUAL THERAPY: (see below for minutes): Joint mobilization and Soft tissue mobilization was utilized and necessary because of the patient's restricted joint motion, painful spasm, loss of articular motion and restricted motion of soft tissue. MODALITIES: (see below for minutes):      to decrease pain    SELF CARE: (see below for minutes): Procedure(s) (per grid) utilized to improve and/or restore self-care/home management as related to dressing, bathing and grooming. Required minimal verbal cueing to facilitate activities of daily living skills and compensatory activities. Date: 2021 (visit 6) 2021 (visit 7) 2021 (visit 8)     Modalities: 15 min        Lumbar IFC/moinst heat: 15 min,  pps, ~ 10 mA. Therapeutic Exercise: 26 min 27 min 27 min      LTR's w /legs over ball: 4 min LTR\"s : legs over physioball: 4 min LTR's legs over physioball AROM x 4 min      Single leg bridgse : 5 x  each L/R, alternating, with trunk rotation SB hamstring curls w/ 10\" holds x 10 for lumbar flexion Hooklying clams w/black band resist at knees: 15 x 5\" holds      Bridges w/ legs over physioballx  20 x 5\"  Bridges w/ holds: 20 x 5\"  Bridges w/ hip abd. judy holds x 15 x 5\"      Seated hamstring stretch: 2 x 30\" each L/R , alternating Supine hamstring stretchin\" x 4 each L/R. Hip L/R assisted ER stretching. L/R side hip figure 4 and piriformis stretching  X 3 min each  Prone over table hip extension w/ neutral spine: 2 x 15 L/R.       Standing lumbar extensions: 12 x 5\"   Standing lumbar extensions: 10 x 5\"      Proprioceptive Activities:                                Manual Therapy: 18 min 18 min 15 min      STM L/R SI joint lines : 12 min STM;: L/R SI  and lumbar paraspinal: 10 min STM: bilateral lumbar and L SI joint line: 12 min      Low lumbar pa mobs gr. 3-4: 2 min Low lumbar pa mobs gr. 3-4: 2 min Central lower lumbar  And left unilateral lumbar pa mobs gr. 3 L2-3-4      L/ R SI long axis tractionin min each L/R L/R LE tractioning: Short leg 2 x 20\", long leg 2 min = each (L/R)      Therapeutic Activities:                                  HEP: Standing lumbar extensions: 10 x 5\", 4x/day, especially when back is painful    [facility] Portal  Treatment/Session Summary:    · Response to Treatment: Limited response to therapy- goal at the remaining visits is to establish a new home program with focus on hip strength to provide better stability for prolonged standing tasks. · Communication/Consultation:  None today  · Equipment provided today:  None today   · Recommendations/Intent for next treatment session: Next visit will focus on  posterior hip strength/endurance and single leg stance stability, exercise conditioning to increase pain-free standing capacity, modalities PRN for pain modulation.    Total Treatment Billable Duration: 42 min  PT Patient Time In/Time Out  Time In: 1020  Time Out: [address], PT    Future Appointments   Date Time Provider Monique Gonzalez   2021 10:15 AM Rebecca Avila, PT [facility]   2021 10:15 AM Kunal Wild, PT [facility]

## 2021-12-21 ENCOUNTER — HOSPITAL ENCOUNTER (OUTPATIENT)
Dept: PHYSICAL THERAPY | Age: 76
Discharge: HOME | End: 2021-12-21
Payer: MEDICARE

## 2021-12-21 PROCEDURE — 97140 MANUAL THERAPY 1/> REGIONS: CPT

## 2021-12-21 PROCEDURE — 97110 THERAPEUTIC EXERCISES: CPT

## 2021-12-21 NOTE — PROGRESS NOTES
Marleen Suarez. : 1945  [place]  [place]: [place] [employer] [address]  [phone]   [phone]      OUTPATIENT PHYSICAL THERAPY: Daily Treatment Note  2021   Pain in right hip (M25.551) and Pain in left hip (M25.552) and Low back pain (M54.5) and Stiffness of unspecified joint, not elsewhere classified (M25.60)  MEDICAL/REFERRING DIAGNOSIS:  Low back pain, unspecified back pain laterality, unspecified chronicity, unspecified whether sciatica present [M54.50]  Sacroiliac joint pain [M53.3]  Spondylosis of lumbar region without myelopathy or radiculopathy [M47.816]    TREATMENT PLAN:  Effective Dates: 2021 TO 2022 (60 days). Frequency/Duration: 2 times a week for 60 Days  GOALS: (Goals have been discussed and agreed upon with patient.)  Short-Term Functional Goals: Time Frame: 4 weeks  1. Independent performance of home exercise program (MET)  2. Pt to demonstrate ability to stand/work at counter up to 15 minutes with minimal pain interruption Not met)  3. Improve hip abductor/extensor strength to 4+/5 with abiltiy to perform sit-stands while lifting 10# 3 x 10 (Partially met)  Discharge Goals: Time Frame: 8 weeks;   1. Pt to demonstrate ability to stand/work at counter up to 20 minutes w/ minimal pain interruption  2. Improve NATE score to < 15/50 to reflect incraesd standing lifting sitting tolerance  3. Pt to demonstrate ability o to Sit : 1 hour without pain interruption. Rehabilitation Potential For Stated Goals: Good  _________________________________________________________________________  Pre-treatment Symptoms/Complaints:  \" Not much different\". Less back pain today as he's not been as busy with home errands.    Pain: Initial: 5/10 Post Session:  No increase/10   Medications Last Reviewed:  2021  Updated Objective Findings:  Below measures from initial evaluation unless otherwise noted. Observation/Orthostatic Postural Assessment:    Normal lumbar curve orientation while standing. Palpation:    No SI joint line or lumbar paraspinal tendernss. ROM:    Lumbar[de-identified] flexion = 40 deg (trunk flexion = 90 deg), extension = 22 deg (trunk = 31 deg): no pain reproduciton with either sing/repeated trunk flexion or trunk extension in standing     Hip:   Hip flexion : left = 130 deg, right = 128 deg. Hip IR: left = 24 deg, right = 16 deg. ER: left = 60 deg, right = 60 deg. Hip extension: 20 deg each L/R. Hamstring lengths (SLR): left and right both moderately limited, ~ 50 deg SLR's.      Strength:   Lower extremity myotomes: hip flexion 4+, knee extension 4+, Ankle dorsiflexion, eversion and EHL 5/5 bilat. Pt performs full range bridge, performs both left and right single leg bridges with effort. Able to abdominal brace and maintain neutral spine alignment during bridging as well as during supine leg lowering. Maximus Jolley Special Tests:     Posterior pelvic (SIJ region) pain reproduced with HANNA test w/added overpressure (both L / R sides). Neurological Screen:  Absent Achilles, patellar DTR's. SLR = 45 deg L/R, muscular tension end feels each side. Slump test neg. L/R. Functional Mobility:   Pt performs supine-sit transfers independently. Balance:     Intact wide base standing balance. TREATMENT:   THERAPEUTIC ACTIVITY: ( see below for minutes): Therapeutic activities per grid below to improve mobility, strength, balance and coordination. Required minimal visual, verbal, manual and tactile cues to improve independence and safety with daily activities . THERAPEUTIC EXERCISE: (see below for minutes):  Exercises per grid below to improve mobility, strength, balance and coordination. Required minimal verbal and manual cues to promote proper body alignment, promote proper body posture and promote proper body mechanics.   Progressed resistance, range, repetitions and complexity of movement as indicated. MANUAL THERAPY: (see below for minutes): Joint mobilization and Soft tissue mobilization was utilized and necessary because of the patient's restricted joint motion, painful spasm, loss of articular motion and restricted motion of soft tissue. MODALITIES: (see below for minutes):      to decrease pain    SELF CARE: (see below for minutes): Procedure(s) (per grid) utilized to improve and/or restore self-care/home management as related to dressing, bathing and grooming. Required minimal verbal cueing to facilitate activities of daily living skills and compensatory activities. Date: 2021 (visit 6) 2021 (visit 7) 2021 (visit 8) 2021 (visit 9)    Modalities: 15 min        Lumbar IFC/moinst heat: 15 min,  pps, ~ 10 mA. Therapeutic Exercise: 26 min 27 min 27 min 28 min     LTR's w /legs over ball: 4 min LTR\"s : legs over physioball: 4 min LTR's legs over physioball AROM x 4 min Supine hamstring stretchin\" x 3 each L/R. Single leg bridgse : 5 x  each L/R, alternating, with trunk rotation SB hamstring curls w/ 10\" holds x 10 for lumbar flexion Hooklying clams w/black band resist at knees: 15 x 5\" holds Lower trunk rotations over ball: AROM x 4 min     Bridges w/ legs over physioballx  20 x 5\"  Bridges w/ holds: 20 x 5\"  Bridges w/ hip abd. judy holds x 15 x 5\" Bridges w/ legs over physioball: 30 x 3\"      Seated hamstring stretch: 2 x 30\" each L/R , alternating Supine hamstring stretchin\" x 4 each L/R. Hip L/R assisted ER stretching. Clams - black band at knees: 30 each L/R. L/R side hip figure 4 and piriformis stretching  X 3 min each  Prone over table hip extension w/ neutral spine: 2 x 15 L/R. Abdominal bracing w/ physioball pickups using legs: 2 x 10      Standing lumbar extensions: 12 x 5\"   Standing lumbar extensions: 10 x 5\"  Semiprone hip extensions over table: 4 x 10 each L/R. Proprioceptive Activities:                                Manual Therapy: 18 min 18 min 15 min 13 min     STM L/R SI joint lines : 12 min STM;: L/R SI  and lumbar paraspinal: 10 min STM: bilateral lumbar and L SI joint line: 12 min STM lumbar/ L SI joint line: 13 min     Low lumbar pa mobs gr. 3-4: 2 min Low lumbar pa mobs gr. 3-4: 2 min Central lower lumbar  And left unilateral lumbar pa mobs gr. 3 L2-3-4      L/ R SI long axis tractionin min each L/R L/R LE tractioning: Short leg 2 x 20\", long leg 2 min = each (L/R)      Therapeutic Activities:                                  HEP: Standing lumbar extensions: 10 x 5\", 4x/day, especially when back is painful    [employer] Portal  Treatment/Session Summary:    · Response to Treatment: On track for discharge to home program after next visit. · Communication/Consultation:  None today  · Equipment provided today:  None today   · Recommendations/Intent for next treatment session: Next visit will focus on  preparatoin for discharge, home program revision.     Total Treatment Billable Duration: 41 min  PT Patient Time In/Time Out  Time In: 1022  Time Out: 4689 [facility], PT    Future Appointments   Date Time Provider Monique Gonzalez   2021 10:15 AM Samson Lemons, PT [facility]

## 2021-12-28 ENCOUNTER — HOSPITAL ENCOUNTER (OUTPATIENT)
Dept: PHYSICAL THERAPY | Age: 76
Discharge: HOME | End: 2021-12-28
Payer: MEDICARE

## 2021-12-28 PROCEDURE — 97110 THERAPEUTIC EXERCISES: CPT

## 2021-12-28 PROCEDURE — 97140 MANUAL THERAPY 1/> REGIONS: CPT

## 2021-12-28 NOTE — THERAPY DISCHARGE
Krystle Hicks. : 1945  [place]  Secondary: [place] [employer] [id number]  [phone]   [phone]      OUTPATIENT PHYSICAL THERAPY: Discharge Summary/ Daily Treatment Note  2021   Pain in right hip (M25.551) and Pain in left hip (M25.552) and Low back pain (M54.5) and Stiffness of unspecified joint, not elsewhere classified (M25.60)  MEDICAL/REFERRING DIAGNOSIS:  Low back pain, unspecified back pain laterality, unspecified chronicity, unspecified whether sciatica present [M54.50]  Sacroiliac joint pain [M53.3]  Spondylosis of lumbar region without myelopathy or radiculopathy [M47.816]    TREATMENT PLAN:  Effective Dates: 2021 TO 2022 (60 days). Frequency/Duration: 2 times a week for 60 Days  GOALS: (Goals have been discussed and agreed upon with patient.)  Short-Term Functional Goals: Time Frame: 4 weeks  1. Independent performance of home exercise program (MET)  2. Pt to demonstrate ability to stand/work at counter up to 15 minutes with minimal pain interruption. ( Not met)  3. Improve hip abductor/extensor strength to 4+/5 with abiltiy to perform sit-stands while lifting 10# 3 x 10 (Partially met)  Discharge Goals: Time Frame: 8 weeks;   1. Pt to demonstrate ability to stand/work at counter up to 20 minutes w/ minimal pain interruption (NOT MET)  2. Improve NATE score to < 15/50 to reflect increased standing/ lifting sitting tolerance (MET)  3. Pt to demonstrate ability o to Sit : 1 hour without pain interruption. (MET)   Rehabilitation Potential For Stated Goals: Good  _________________________________________________________________________  Pre-treatment Symptoms/Complaints:  Back is not as bad today. Pt reports working on his back rotation exercises at home, using a physioball to do so now.  .   Pain: Initial: 4/10 Post Session:  No increase/10 Medications Last Reviewed:  12/28/2021  Updated Objective Findings:    Observation/Orthostatic Postural Assessment:    Normal lumbar curve orientation while standing. Palpation:    No SI joint line or lumbar paraspinal tendernss. ROM:    Lumbar[de-identified] flexion = 23 deg (trunk flexion = 83 deg), extension = 23 deg, no pain reproduciton with either single/repeated trunk flexion or trunk extension in standing     Hip:   Hip flexion : left = 130 deg, right = 128 deg. Hip IR: left =15 deg, right = 15 deg. ER: left = 60 deg, right = 60 deg. Hip extension: 20 deg each L/R. Hamstring lengths (SLR): left and right both moderately limited, ~ 50 deg SLR's.      Strength:   Lower extremity myotomes: hip flexion 4+, knee extension 4+, Ankle dorsiflexion, eversion and EHL 5/5 bilat. Pt performs full range bridge x 20. Able to abdominal brace and maintain neutral spine alignment during bridging as well as during supine double flexed leg lowering. Special Tests:     Posterior pelvic (SIJ region) pain reproduced with HANNA test w/added overpressure (both L / R sides). Neurological Screen:  Absent Achilles, patellar DTR's. SLR = 45 deg L/R, muscular tension end feels each side. Slump test neg. L/R. Functional Mobility:   Pt performs supine-sit transfers independently. Balance:     Intact wide base standing balance. TREATMENT:   THERAPEUTIC ACTIVITY: ( see below for minutes): Therapeutic activities per grid below to improve mobility, strength, balance and coordination. Required minimal visual, verbal, manual and tactile cues to improve independence and safety with daily activities . THERAPEUTIC EXERCISE: (see below for minutes):  Exercises per grid below to improve mobility, strength, balance and coordination. Required minimal verbal and manual cues to promote proper body alignment, promote proper body posture and promote proper body mechanics.   Progressed resistance, range, repetitions and complexity of movement as indicated. MANUAL THERAPY: (see below for minutes): Joint mobilization and Soft tissue mobilization was utilized and necessary because of the patient's restricted joint motion, painful spasm, loss of articular motion and restricted motion of soft tissue. MODALITIES: (see below for minutes):      to decrease pain    SELF CARE: (see below for minutes): Procedure(s) (per grid) utilized to improve and/or restore self-care/home management as related to dressing, bathing and grooming. Required minimal verbal cueing to facilitate activities of daily living skills and compensatory activities. Date: 2021 (visit 6) 2021 (visit 7) 2021 (visit 8) 2021 (visit 9) 21 (visit 10)   Modalities: 15 min        Lumbar IFC/moinst heat: 15 min,  pps, ~ 10 mA. Therapeutic Exercise: 26 min 27 min 27 min 28 min 30 min    LTR's w /legs over ball: 4 min LTR\"s : legs over physioball: 4 min LTR's legs over physioball AROM x 4 min Supine hamstring stretchin\" x 3 each L/R. LTR's legs over physioball: AROM x 4 min    Single leg bridgse : 5 x  each L/R, alternating, with trunk rotation SB hamstring curls w/ 10\" holds x 10 for lumbar flexion Hooklying clams w/black band resist at knees: 15 x 5\" holds Lower trunk rotations over ball: AROM x 4 min Assisted L/R hip ER stretchin min each    Bridges w/ legs over physioballx  20 x 5\"  Bridges w/ holds: 20 x 5\"  Bridges w/ hip abd. judy holds x 15 x 5\" Bridges w/ legs over physioball: 30 x 3\"  SB lumbar supine flexion/ham curls w/ 10 x 10\" holds    Seated hamstring stretch: 2 x 30\" each L/R , alternating Supine hamstring stretchin\" x 4 each L/R. Hip L/R assisted ER stretching. Clams - black band at knees: 30 each L/R. Abdominal bracing w/ physioball pickups: 10x     L/R side hip figure 4 and piriformis stretching  X 3 min each  Prone over table hip extension w/ neutral spine: 2 x 15 L/R.  Abdominal bracing w/ physioball pickups using legs: 2 x 10  Semiprone hip extensions: 3 x 10 L/R. Standing lumbar extensions: 12 x 5\"   Standing lumbar extensions: 10 x 5\"  Semiprone hip extensions over table: 4 x 10 each L/R. Standing lumbar extensions: 10 x 5\"    Proprioceptive Activities:                                Manual Therapy: 18 min 18 min 15 min 13 min 14 min    STM L/R SI joint lines : 12 min STM;: L/R SI  and lumbar paraspinal: 10 min STM: bilateral lumbar and L SI joint line: 12 min STM lumbar/ L SI joint line: 13 min L/R lumbar and L SI joint line STM: 12 min    Low lumbar pa mobs gr. 3-4: 2 min Low lumbar pa mobs gr. 3-4: 2 min Central lower lumbar  And left unilateral lumbar pa mobs gr. 3 L2-3-4  L /R hip long leg traction: 2 min each    L/ R SI long axis tractionin min each L/R L/R LE tractioning: Short leg 2 x 20\", long leg 2 min = each (L/R)      Therapeutic Activities:                                     HEP: : Perform 1x/day (written instructions issued/reviewed 2021)  Lower trunk rotations w/ ball under legs: x 30  Hooklying SB ham curls w/ flexion holds : 10 x 10\"  Bridging: 20 x 5\"  Semiprone hip extensions over table: 3 x 10 each L/R  Standing lumbar extensions: 10 x 5\"    CLINICAL DECISION MAKING:  Tool Used: Modified Oswestry Low Back Pain Questionnaire  Score:  Initial:  Most Recent:  (Date: 2021 )    Interpretation of Score: Each section is scored on a 0-5 scale, 5 representing the greatest disability. The scores of each section are added together for a total score of 50. [employer] Portal  Treatment/Session Summary:    · Response to Treatment: Ready for discharge to [facility] rehab potential reached.    · Communication/Consultation:  None today  · Equipment provided today:  None today   Recommendations/Intent for next treatment session: Discharge to home exercise program.    Total Treatment Billable Duration: 44 min  PT Patient Time In/Time Out  Time In: 1018  Time Out: 1105  George MCKEON Andrez Mars, PT    Future Appointments   Date Time Provider Department Center   1/17/2022  8:50 Chris Bucio MD [facility]

## 2022-03-18 PROBLEM — R68.82 LOW LIBIDO: Status: ACTIVE | Noted: 2018-03-26

## 2022-03-18 PROBLEM — R35.1 NOCTURIA: Status: ACTIVE | Noted: 2018-03-26

## 2022-03-18 PROBLEM — F52.32 ANORGASMIA OF MALE: Status: ACTIVE | Noted: 2018-03-26

## 2022-03-18 PROBLEM — G56.01 CARPAL TUNNEL SYNDROME OF RIGHT WRIST: Status: ACTIVE | Noted: 2019-04-01

## 2022-03-18 PROBLEM — I77.0 ARTERIOVENOUS FISTULA (HCC): Status: ACTIVE | Noted: 2021-01-25

## 2022-03-19 PROBLEM — I67.1 CEREBROVASCULAR MALFORMATION, DURAL AV FISTULA: Status: ACTIVE | Noted: 2019-08-09

## 2022-03-19 PROBLEM — E78.00 HYPERCHOLESTEROLEMIA: Status: ACTIVE | Noted: 2017-01-17

## 2022-03-19 PROBLEM — R06.09 DYSPNEA ON EXERTION: Status: ACTIVE | Noted: 2020-04-24

## 2022-03-19 PROBLEM — K21.9 GERD (GASTROESOPHAGEAL REFLUX DISEASE): Status: ACTIVE | Noted: 2020-03-11

## 2022-03-19 PROBLEM — G47.10 HYPERSOMNOLENCE: Status: ACTIVE | Noted: 2017-02-13

## 2022-03-19 PROBLEM — M19.90 ARTHRITIS: Status: ACTIVE | Noted: 2020-03-11

## 2022-03-19 PROBLEM — R07.9 CHEST PAIN: Status: ACTIVE | Noted: 2017-09-11

## 2022-03-19 PROBLEM — R39.12 WEAK URINARY STREAM: Status: ACTIVE | Noted: 2018-03-26

## 2022-03-20 PROBLEM — E11.59 DIABETES MELLITUS WITH CIRCULATORY DISORDER CAUSING ERECTILE DYSFUNCTION (HCC): Status: ACTIVE | Noted: 2020-09-04

## 2022-03-20 PROBLEM — F98.8 ADD (ATTENTION DEFICIT DISORDER): Status: ACTIVE | Noted: 2020-03-11

## 2022-03-20 PROBLEM — G47.33 OSA (OBSTRUCTIVE SLEEP APNEA): Status: ACTIVE | Noted: 2017-08-25

## 2022-03-20 PROBLEM — M79.644 PAIN OF RIGHT THUMB: Status: ACTIVE | Noted: 2019-04-01

## 2022-03-20 PROBLEM — N52.1 DIABETES MELLITUS WITH CIRCULATORY DISORDER CAUSING ERECTILE DYSFUNCTION (HCC): Status: ACTIVE | Noted: 2020-09-04

## 2022-04-05 PROBLEM — G47.34 NOCTURNAL HYPOXEMIA: Status: ACTIVE | Noted: 2022-04-05

## 2022-05-10 ENCOUNTER — HOSPITAL ENCOUNTER (OUTPATIENT)
Dept: SLEEP MEDICINE | Age: 77
Discharge: HOME | End: 2022-05-10

## 2022-06-20 ENCOUNTER — TELEPHONE (OUTPATIENT)
Dept: SLEEP MEDICINE | Age: 77
End: 2022-06-20

## 2022-06-20 DIAGNOSIS — G47.33 OSA (OBSTRUCTIVE SLEEP APNEA): Primary | ICD-10-CM

## 2022-06-20 NOTE — TELEPHONE ENCOUNTER
Pt had sleep study in may. He would like to start cpap instead of the inspire .  What pressure would you like for me to put the pt on?

## 2022-06-20 NOTE — TELEPHONE ENCOUNTER
Patient said he had talk with the people about the [facility] and he has decided that he was not going with it. He is goung to stay with a cpap .  He said he does qualify for aa new one and he would like to change the type of face mask that he uses     #[phone]

## 2022-06-21 NOTE — TELEPHONE ENCOUNTER
We can start the patient on CPAP at 5-10 cm with a EPR 3 and humidity. We will need a follow-up in 3 months after CPAP is initiated.

## 2022-08-04 NOTE — PROGRESS NOTES
[address] [facility], [address] [facility]  [phone]    Patient Name:  Mervat Paredes. YOB: 1945      Office Visit 8/5/2022    CHIEF COMPLAINT:    Chief Complaint   Patient presents with    CPAP/BiPAP    Sleep Apnea         HISTORY OF PRESENT ILLNESS:      This is a very pleasant 78-year-old gentleman seen in follow-up of obstructive sleep apnea. He was last seen in the sleep center back in April. At that time he was having some increased symptoms and a new home sleep test was ordered. This was performed on 5/10/2022 and revealed an AHI of 22.0 and desaturations as low as 76%. Disordered breathing was present in multiple body positions. Based upon the results, an AutoSet was recommended. Apparently his [employer] company[employer], did not fill the AutoSet device possibly because a new prescription had not been sent. Since that last visit, he had looked into the [facility] device and now definitely does not want to proceed with any kind of evaluation. We did discuss an oral appliance but in looking at his oral anatomy he does have some asymmetry to the tongue and also a lax soft palate which may make the oral appliance less effective. The patient has been on CPAP in the past.  Apparently he did not like it but in discussing his old machine it does appear to be set in a straight CPAP mode. He recently was able to get a new nasal pillow mask with the tubing connecting at the crown of the head which she likes much better. I think he would do well with his current mask and an AutoSet machine to improve tolerance. He believes that his current machine is over 11years old and so he should be up to get a new device. The patient's [place] score today is still elevated at 11/24. This suggests that his current CPAP machine is not controlling his disordered breathing adequately.      The patient did have recent blood work at [facility] which revealed only a very prostatitis 11/15/2013    Colitis, enteritis, and gastroenteritis of presumed infectious origin     Depressive disorder, not elsewhere classified     Patient denies depression but has anxiety    Disorders of bursae and tendons in shoulder region, unspecified     Fatty liver     Former smoker     GERD (gastroesophageal reflux disease)     controlled w/med    History of tetanus, diphtheria, and acellular pertussis booster vaccination (Tdap)     done 2010    Hypercholesteremia     Hypertrophy of prostate with urinary obstruction and other lower urinary tract symptoms (LUTS) 11/15/2013    Impotence of organic origin     Inguinal hernia 2014    bilat    Insomnia, unspecified     Medial epicondylitis of elbow     Nonspecific elevation of levels of transaminase or lactic acid dehydrogenase (LDH)     Orthostatic hypotension     Osteoporosis 11/15/2013    Other and unspecified hyperlipidemia     Pain of right thumb 4/1/2019    Sleep apnea     No CPAP     Tremor     from Adderall, Inderal prn    Type II or unspecified type diabetes mellitus without mention of complication, not stated as uncontrolled     per pt.  \"pre-diabetic\"; no treatment; last A1C 6.2    Vertigo          [unfilled]      Past Surgical History:   Procedure Laterality Date    CARDIAC CATHETERIZATION  2017    2 stents    CYSTOSCOPY,INSERT URETERAL STENT      Patient denies ureteral stent    HEENT      left eye laser for detached retina    HERNIA REPAIR Bilateral 2/3/14    inguinal    IR OCCLUSIVE OR EMBOL PERC CENTL NERV SYS  3/9/2020    IR OCCLUSIVE OR EMBOL PERC CENTL NERV SYS  8/9/2019    IR OCCLUSIVE OR EMBOL PERC CENTL NERV SYS  3/9/2020    IR OCCLUSIVE OR EMBOL PERC CENTL NERV SYS 3/9/2020 [facility] NEUROENDOVAS    IR OCCLUSIVE OR EMBOL PERC CENTL NERV SYS  8/9/2019    IR OCCLUSIVE OR EMBOL PERC CENTL NERV SYS 8/9/2019 [facility] RADIOLOGY SPECIALS    KNEE ARTHROSCOPY Right 2000    LUMBAR [facility]  2[facility]2012    [facility]     ORTHOPEDIC SURGERY  2010 right hand    PTCA  2017    2 stents     SHOULDER ARTHROSCOPY Right 1995    TONSILLECTOMY      TURP  2006,    UROLOGICAL SURGERY      Bipolar vaporization of nodular regrowth of BPH after prior [facility] laser    UROLOGICAL SURGERY      [facility] Laser    VASECTOMY         [unfilled]        Social History     Socioeconomic History    Marital status:      Spouse name: Not on file    Number of children: Not on file    Years of education: Not on file    Highest education level: Not on file   Occupational History    Not on file   Tobacco Use    Smoking status: Former     Packs/day: 1.00     Types: Cigarettes     Quit date: 1977     Years since quittin.4    Smokeless tobacco: Never   Substance and Sexual Activity    Alcohol use: Yes     Alcohol/week: 5.8 standard drinks    Drug use: No    Sexual activity: Not on file   Other Topics Concern    Not on file   Social History Narrative    Not on file     Social Determinants of Health     Financial Resource Strain: Not on file   Food Insecurity: Not on file   Transportation Needs: Not on file   Physical Activity: Not on file   Stress: Not on file   Social Connections: Not on file   Intimate Partner Violence: Not on file   Housing Stability: Not on file         Family History   Problem Relation Age of Onset    Coronary Art Dis Father     Glaucoma Mother     Stroke Mother     Stroke Brother     Heart Disease Brother     Coronary Art Dis Brother     Heart Disease Father     Cancer Father         esophageal    Hypertension Mother          Allergies   Allergen Reactions    Germanium Other (See Comments)     Allergy to vinegar \"my nose runs\" and allergy to yeast \"my nose gets stuffy;' red wine-runny nose  Other reaction(s):  Other (see comments)  Allergy to vinegar \"my nose runs\" and allergy to yeast \"my nose gets stuffy;' red wine-runny nose  Allergy to vinegar \"my nose runs\" and allergy to yeast \"my nose gets stuffy;' red wine-runny nose Current Outpatient Medications   Medication Sig    MELATONIN PO Take by mouth    MILK THISTLE PO Take by mouth    POTASSIUM PO Take by mouth    ZINC PO Take by mouth    acetaminophen (TYLENOL) 500 MG tablet Take by mouth    aluminum chloride (DRYSOL) 20 % external solution CASSI AA AS DIRECTED FOR SWEATING    amphetamine-dextroamphetamine (ADDERALL) 20 MG tablet Take 20 mg by mouth 2 times daily. amphetamine-dextroamphetamine (ADDERALL) 5 MG tablet Take 5 mg by mouth daily. ascorbic acid (VITAMIN C) 500 MG tablet Take 1,000 mg by mouth daily    aspirin 81 MG EC tablet Take 81 mg by mouth daily    atorvastatin (LIPITOR) 40 MG tablet Take 40 mg by mouth    atorvastatin (LIPITOR) 80 MG tablet Take 80 mg by mouth    vitamin D 25 MCG (1000 UT) CAPS 2,000 Units daily    clonazePAM (KLONOPIN) 1 MG tablet Take 0.5 mg by mouth.    coenzyme Q10 100 MG CAPS capsule Take 200 mg by mouth daily    famotidine (PEPCID) 40 MG tablet Take 40 mg by mouth 2 times daily    gabapentin (NEURONTIN) 300 MG capsule Take 300 mg by mouth 3 times daily. hyoscyamine (ANASPAZ;LEVSIN) 125 MCG tablet Take 0.125 mg by mouth    metoprolol tartrate (LOPRESSOR) 25 MG tablet Take 25 mg by mouth 2 times daily    omeprazole (PRILOSEC) 20 MG delayed release capsule TAKE 1 CAPSULE (20MG) BY ORAL ROUTE EVERY DAY, 30 MINUTES PRIOR TO BREAKFAST    pregabalin (LYRICA) 50 MG capsule Take 50 mg by mouth 2 times daily. tadalafil (CIALIS) 5 MG tablet Take 5 mg by mouth as needed    triamterene-hydroCHLOROthiazide (DYAZIDE) 37.5-25 MG per capsule TK 1 C PO QD     No current facility-administered medications for this visit. REVIEW OF SYSTEMS:   CONSTITUTIONAL:   There is no history of fever, chills, night sweats, weight loss, weight gain, persistent fatigue, or lethargy/hypersomnolence. CARDIAC:   No chest pain, pressure, discomfort, palpitations, orthopnea, murmurs, or edema.    GI:   No dysphagia, heartburn reflux, nausea/vomiting, diarrhea, abdominal pain, or bleeding. NEURO:   There is no history of AMS, persistent headache, decreased level of consciousness, seizures, or motor or sensory deficits. PHYSICAL EXAM:    Vitals:    08/05/22 1028   BP: 120/60   Pulse: 57   Resp: 16   Temp: 97.1 °F (36.2 °C)   SpO2: 97%        GENERAL APPEARANCE:   The patient is a bit underweight and in no respiratory distress. HEENT:   PERRL. Conjunctivae unremarkable. Nasal mucosa is without epistaxis, exudate, or polyps. Gums and dentition are unremarkable. There is moderate oropharyngeal narrowing due to a lax soft palate. His tongue is also somewhat asymmetric. NECK/LYMPHATIC:   Symmetrical with no elevation of jugular venous pulsation. Trachea midline. No thyroid enlargement. No cervical adenopathy. LUNGS:   Normal respiratory effort with symmetrical lung expansion. Breath sounds are clear bilaterally. [place] Stands HEART:   There is a regular rate and rhythm. No murmur, rub, or gallop. There is no edema in the lower extremities. ABDOMEN:   Soft and non-tender. No hepatosplenomegaly. Bowel sounds are normal.     NEURO:   The patient is alert and oriented to person, place, and time. Memory appears intact and mood is normal.  No gross sensorimotor deficits are present. ASSESSMENT:  (Medical Decision Making)      ICD-10-CM    1. KVNG (obstructive sleep apnea)  G47.33 The patient has moderate obstructive sleep apnea associated with severe arterial hypoxemia. The duration of desaturations was quite short and only 7.8 minutes. I think he should do well with an AutoSet device. We will try him on 5-15 cmH2O with an EPR of 2. He can continue his current nasal pillow mask. 2. Hypersomnolence  G47.10 This is mild. I suspect he needs more pressure than is currently set on his old device. It sounds like it is a straight CPAP machine and we will order a new AutoSet for him. PLAN:    Begin APAP at 5-15 cmH2O with an EPR of 2. He will continue the new nasal pillow mask that he just received from [address] [place] [address]. Follow-up will be in about 4 months or sooner if he has problems. Orders Placed This Encounter   Procedures    DME - [address] [facility]  Phone: Oziel Rios [phone]  Dept: [phone]      Patient Name: Gloria Velez. : 1945  Gender: male  Address: [facility] [address]  Patient phone number: [phone] (home)       Primary Insurance: Payor: MEDICARE / Plan: MEDICARE PART A AND B / Product Type: *No Product type* /   Subscriber ID: [id number] - (Medicare)      AMB Supply Order  Order Details     DME Location: [facility]   Order Date: 2022   The primary encounter diagnosis was KVNG (obstructive sleep apnea). A diagnosis of Hypersomnolence was also pertinent to this visit.           (  X   )New Set-Up    Machine   (     ) CPAP Unit  (  x   ) Auto CPAP Unit  (     ) BiLevel Unit  (     ) Auto BiLevel Unit  (     ) ASV   (     ) Bilevel ST    (     ) Oxygen Concentrator         Length of need: 12 months    Pressure: 5-15 cmH20  EPR: 2     Starting Ramp Pressure:  5 cm H20  Ramp Time: min N/A    Patient had a diagnostic Apnea Hypopnea Index (AHI) of : 22.0    *SUPPLIES* Replace all as needed, or per coverage guidelines     Masks Type:    (     ) -Full Face Mask (1 per 3 mon)  (     ) -Full Mask (1 per month) Interface/Cushion      (   x  ) -Nasal Mask (1 per 3 mon) <<< May use new pillow mask which he recently received. >>>  None  (     ) - Nasal Mask (1 per month) Interface/Cushion  (   x  ) -Pillow (2 per mon)  (     ) -Smwajbsin (1 per 6 mon)      _________________________________________________________________          Other Supplies:    (  X   )-Pdzmocdu (1 per 6 mon)  ( X    )-Bnfoyx Tubing (1 per 3 mon)  (  X   )- Disposable Filter (2 per mon)  (   X  )-Xvbpcw Humidifier (1 per year)     (  x   )-Zhhzjcoqz (sometimes used with Full Face Mask) (1 per 6 mos)  (     )-Tubing-without heat (1 per 3 mos)  ( X   )-Non-Disposable Filter (1 per 6 mos)  (   x  )-Water Chamber (1 per 6 mos)  (     )-Humidifier non-heated (1 per 5 yrs)      Signed Date: 8/5/2022  Electronically Signed By: Eli Sunshine MD        No orders of the defined types were placed in this encounter. Eli Sunshine MD  Electronically signed    Over 50% of today's office visit was spent in face to face time reviewing test results, prognosis, importance of compliance, education about disease process, benefits of medications, instructions for management of acute flare-ups, and follow up plans. Total face to face time spent with the patient and charting was 23 minutes. Dictated using voice recognition software.   Proof read but unrecognized errors may exist.

## 2022-08-05 ENCOUNTER — OFFICE VISIT (OUTPATIENT)
Dept: SLEEP MEDICINE | Age: 77
End: 2022-08-05
Payer: MEDICARE

## 2022-08-05 VITALS
SYSTOLIC BLOOD PRESSURE: 120 MMHG | HEIGHT: 69 IN | TEMPERATURE: 97.1 F | DIASTOLIC BLOOD PRESSURE: 60 MMHG | RESPIRATION RATE: 16 BRPM | HEART RATE: 57 BPM | WEIGHT: 145.6 LBS | OXYGEN SATURATION: 97 % | BODY MASS INDEX: 21.56 KG/M2

## 2022-08-05 DIAGNOSIS — G47.33 OSA (OBSTRUCTIVE SLEEP APNEA): Primary | ICD-10-CM

## 2022-08-05 DIAGNOSIS — G47.10 HYPERSOMNOLENCE: ICD-10-CM

## 2022-08-05 PROCEDURE — G8427 DOCREV CUR MEDS BY ELIG CLIN: HCPCS | Performed by: INTERNAL MEDICINE

## 2022-08-05 PROCEDURE — 99214 OFFICE O/P EST MOD 30 MIN: CPT | Performed by: INTERNAL MEDICINE

## 2022-08-05 PROCEDURE — 1123F ACP DISCUSS/DSCN MKR DOCD: CPT | Performed by: INTERNAL MEDICINE

## 2022-08-05 PROCEDURE — 1036F TOBACCO NON-USER: CPT | Performed by: INTERNAL MEDICINE

## 2022-08-05 PROCEDURE — G8420 CALC BMI NORM PARAMETERS: HCPCS | Performed by: INTERNAL MEDICINE

## 2022-08-05 ASSESSMENT — SLEEP AND FATIGUE QUESTIONNAIRES
HOW LIKELY ARE YOU TO NOD OFF OR FALL ASLEEP WHEN YOU ARE A PASSENGER IN A CAR FOR AN HOUR WITHOUT A BREAK: 0
HOW LIKELY ARE YOU TO NOD OFF OR FALL ASLEEP IN A CAR, WHILE STOPPED FOR A FEW MINUTES IN TRAFFIC: 0
HOW LIKELY ARE YOU TO NOD OFF OR FALL ASLEEP WHILE SITTING AND TALKING TO SOMEONE: 0
HOW LIKELY ARE YOU TO NOD OFF OR FALL ASLEEP WHILE SITTING INACTIVE IN A PUBLIC PLACE: 2
HOW LIKELY ARE YOU TO NOD OFF OR FALL ASLEEP WHILE WATCHING TV: 2
HOW LIKELY ARE YOU TO NOD OFF OR FALL ASLEEP WHILE SITTING AND READING: 2
HOW LIKELY ARE YOU TO NOD OFF OR FALL ASLEEP WHILE SITTING QUIETLY AFTER LUNCH WITHOUT ALCOHOL: 2
ESS TOTAL SCORE: 11
HOW LIKELY ARE YOU TO NOD OFF OR FALL ASLEEP WHILE LYING DOWN TO REST IN THE AFTERNOON WHEN CIRCUMSTANCES PERMIT: 3

## 2022-12-02 NOTE — PROGRESS NOTES
[address] [place] [address] Court, [address] [facility]  [phone]    Patient Name:  Jennyfer Mckee YOB: 1945      Office Visit 12/5/2022    CHIEF COMPLAINT:    Chief Complaint   Patient presents with    Sleep Apnea    Follow-up         HISTORY OF PRESENT ILLNESS:  Patient is a 75 yo male seen today for follow up of KVNG. Diagnostic sleep study on 05/10/2022 with an AHI of 22.0 and lowest desaturation of 76%. He was last seen in sleep medicine on 08/05/2022 and a new CPAP machine was ordered to be set in the auto setting of 5-15 cm H2O. He is prescribed cpap therapy with a humidifier set at 5-10 cm with a nasal pillow mask. Most recent download reveals AHI on PAP therapy is 2.0, leak is 19.5 and the hourly usage is 7 hours 45 minutes nightly. The overall use is 333 hours with days greater than four hours at 43/45. The patient is compliant with the Pap therapy and is feeling better as a result. He reports he is doing well with CPAP therapy and awakens refreshed in the mornings. Denies any excessive daytime sleepiness or fatigue. Does report that he is having some difficulty with his headgear sliding off during the night. We discussed potentially changing mask to a fullface mask with more substantial headgear. He will go by [email or web address] and look at the different mask options as soon as possible. He denies any major medical changes over the last 4 months. His blood pressure is controlled today.         Past Medical History:   Diagnosis Date    ADD (attention deficit disorder)     controlled with Adderall    Anemia, unspecified     Aneurysm of unspecified site [facility])     Patient denies    Anxiety     Arthritis     hands, back    Attention deficit disorder with hyperactivity(314.01)     Benign paroxysmal vertigo     BPH (benign prostatic hypertrophy)     CAD (coronary artery disease)     2 stents placed 09/14/17; denes MI    Carpal tunnel syndrome of right wrist 4/1/2019 Chronic prostatitis 11/15/2013    Colitis, enteritis, and gastroenteritis of presumed infectious origin     Depressive disorder, not elsewhere classified     Patient denies depression but has anxiety    Disorders of bursae and tendons in shoulder region, unspecified     Fatty liver     Former smoker     GERD (gastroesophageal reflux disease)     controlled w/med    History of tetanus, diphtheria, and acellular pertussis booster vaccination (Tdap)     done 2010    Hypercholesteremia     Hypertrophy of prostate with urinary obstruction and other lower urinary tract symptoms (LUTS) 11/15/2013    Impotence of organic origin     Inguinal hernia 2014    bilat    Insomnia, unspecified     Medial epicondylitis of elbow     Nonspecific elevation of levels of transaminase or lactic acid dehydrogenase (LDH)     Orthostatic hypotension     Osteoporosis 11/15/2013    Other and unspecified hyperlipidemia     Pain of right thumb 4/1/2019    Sleep apnea     No CPAP     Tremor     from Adderall, Inderal prn    Type II or unspecified type diabetes mellitus without mention of complication, not stated as uncontrolled     per pt.  \"pre-diabetic\"; no treatment; last A1C 6.2    Vertigo          Patient Active Problem List   Diagnosis    Hematuria    Carpal tunnel syndrome of right wrist    Impotence of organic origin    Retention of urine, unspecified    Elevated prostate specific antigen (PSA)    Chronic prostatitis    Iron deficiency anemia    Diabetes mellitus ([facility] [address]    Memory change    Hypercholesteremia    Agatston coronary artery calcium score between 200 and 399    Arteriovenous fistula (HCC)    Lumbar stenosis with neurogenic claudication    Low libido    Nocturia    Chronic coronary artery disease    Anorgasmia of male    Attention and concentration deficit    Right hand pain    Anejaculation    Chest pain    Bilateral inguinal hernia    BPH with obstruction/lower urinary tract symptoms    Cerebrovascular malformation, dural AV fistula    Nodular prostate with urinary obstruction    Insomnia    Weak urinary stream    Hypersomnolence    Dyspnea on exertion    Encounter for antineoplastic chemotherapy    Finger mass, right    Orgasm disorder    Essential hypertension    ED (erectile dysfunction) of organic origin    Hypercholesterolemia    GERD (gastroesophageal reflux disease)    Arthritis    Osteoporosis    Pain of right thumb    KVNG (obstructive sleep apnea)    Split of urinary stream    Diabetes mellitus with circulatory disorder causing erectile dysfunction (HCC)    ADD (attention deficit disorder)    Nocturnal hypoxemia          Past Surgical History:   Procedure Laterality Date    CARDIAC CATHETERIZATION  2017    2 stents    CYSTOSCOPY,INSERT URETERAL STENT      Patient denies ureteral stent    HEENT      left eye laser for detached retina    HERNIA REPAIR Bilateral 2/3/14    inguinal    IR OCCLUSIVE OR EMBOL PERC CENTL NERV SYS  3/9/2020    IR OCCLUSIVE OR EMBOL PERC CENTL NERV SYS  8/9/2019    IR OCCLUSIVE OR EMBOL PERC CENTL NERV SYS  3/9/2020    IR OCCLUSIVE OR EMBOL PERC CENTL NERV SYS 3/9/2020 [facility] RAD NEUROENDOVAS    IR OCCLUSIVE OR EMBOL PERC CENTL NERV SYS  8/9/2019    IR OCCLUSIVE OR EMBOL PERC CENTL NERV SYS 8/9/2019 [facility] RADIOLOGY SPECIALS    KNEE ARTHROSCOPY Right 2000    LUMBAR LAMINECTOMY  2/2012    MOHS SURGERY Right     ORTHOPEDIC SURGERY  2010    right hand    PTCA  09/14/2017    2 stents     SHOULDER ARTHROSCOPY Right 1995    TONSILLECTOMY      TURP  2006,2013    UROLOGICAL SURGERY      Bipolar vaporization of nodular regrowth of BPH after prior Greenlight laser    UROLOGICAL SURGERY      Greenlight Laser    VASECTOMY             Social History     Socioeconomic History    Marital status:      Spouse name: Not on file    Number of children: Not on file    Years of education: Not on file    Highest education level: Not on file   Occupational History    Not on file   Tobacco Use    Smoking status: Former Packs/day: 1.00     Types: Cigarettes     Quit date: 1977     Years since quittin.8    Smokeless tobacco: Never   Substance and Sexual Activity    Alcohol use: Yes     Alcohol/week: 5.8 standard drinks    Drug use: No    Sexual activity: Not on file   Other Topics Concern    Not on file   Social History Narrative    Not on file     Social Determinants of Health     Financial Resource Strain: Not on file   Food Insecurity: Not on file   Transportation Needs: Not on file   Physical Activity: Not on file   Stress: Not on file   Social Connections: Not on file   Intimate Partner Violence: Not on file   Housing Stability: Not on file         Family History   Problem Relation Age of Onset    Coronary Art Dis Father     Glaucoma Mother     Stroke Mother     Stroke Brother     Heart Disease Brother     Coronary Art Dis Brother     Heart Disease Father     Cancer Father         esophageal    Hypertension Mother          Allergies   Allergen Reactions    Germanium Other (See Comments)     Allergy to vinegar \"my nose runs\" and allergy to yeast \"my nose gets stuffy;' red wine-runny nose  Other reaction(s): Other (see comments)  Allergy to vinegar \"my nose runs\" and allergy to yeast \"my nose gets stuffy;' red wine-runny nose  Allergy to vinegar \"my nose runs\" and allergy to yeast \"my nose gets stuffy;' red wine-runny nose         Current Outpatient Medications   Medication Sig    MELATONIN PO Take by mouth    MILK THISTLE PO Take by mouth    POTASSIUM PO Take by mouth    ZINC PO Take by mouth    acetaminophen (TYLENOL) 500 MG tablet Take by mouth    aluminum chloride (DRYSOL) 20 % external solution CASSI AA AS DIRECTED FOR SWEATING    amphetamine-dextroamphetamine (ADDERALL) 20 MG tablet Take 20 mg by mouth 2 times daily.     ascorbic acid (VITAMIN C) 500 MG tablet Take 1,000 mg by mouth daily    aspirin 81 MG EC tablet Take 81 mg by mouth daily    atorvastatin (LIPITOR) 80 MG tablet Take 80 mg by mouth    vitamin D 25 MCG (1000 UT) CAPS 2,000 Units daily    clonazePAM (KLONOPIN) 1 MG tablet Take 0.5 mg by mouth.    coenzyme Q10 100 MG CAPS capsule Take 200 mg by mouth daily    famotidine (PEPCID) 40 MG tablet Take 40 mg by mouth 2 times daily    hyoscyamine (ANASPAZ;LEVSIN) 125 MCG tablet Take 0.125 mg by mouth    metoprolol tartrate (LOPRESSOR) 25 MG tablet Take 25 mg by mouth 2 times daily    omeprazole (PRILOSEC) 20 MG delayed release capsule TAKE 1 CAPSULE (20MG) BY ORAL ROUTE EVERY DAY, 30 MINUTES PRIOR TO BREAKFAST    tadalafil (CIALIS) 5 MG tablet Take 5 mg by mouth as needed    amphetamine-dextroamphetamine (ADDERALL) 5 MG tablet Take 5 mg by mouth daily. (Patient not taking: Reported on 12/5/2022)    atorvastatin (LIPITOR) 40 MG tablet Take 40 mg by mouth (Patient not taking: Reported on 12/5/2022)    gabapentin (NEURONTIN) 300 MG capsule Take 300 mg by mouth 3 times daily. (Patient not taking: Reported on 12/5/2022)    pregabalin (LYRICA) 50 MG capsule Take 50 mg by mouth 2 times daily. (Patient not taking: Reported on 12/5/2022)    triamterene-hydroCHLOROthiazide (DYAZIDE) 37.5-25 MG per capsule TK 1 C PO QD (Patient not taking: Reported on 12/5/2022)     No current facility-administered medications for this visit. REVIEW OF SYSTEMS:   CONSTITUTIONAL:   There is no history of fever, chills, night sweats, weight loss, weight gain, persistent fatigue, or lethargy/hypersomnolence. CARDIAC:   No chest pain, pressure, discomfort, palpitations, orthopnea, murmurs, or edema. GI:   No dysphagia, heartburn reflux, nausea/vomiting, diarrhea, abdominal pain, or bleeding. NEURO:   There is no history of AMS, persistent headache, decreased level of consciousness, seizures, or motor or sensory deficits.       PHYSICAL EXAM:    Vitals:    12/05/22 1011   BP: 114/64   Pulse: 68   Resp: 15   Temp: (!) 96.4 °F (35.8 °C)   SpO2: 98%   Weight: 145 lb 9.6 oz (66 kg)   Height: 5' 9\" (1.753 m)        BMI: 21.50       GENERAL APPEARANCE:   The patient is normal weight and in no respiratory distress. HEENT:   PERRL. Conjunctivae unremarkable. Nasal mucosa is without epistaxis, exudate, or polyps. Nares patent bilateral.  Gums and dentition are unremarkable. There is oropharyngeal narrowing. Arevalo score 2. NECK/LYMPHATIC:   Symmetrical with no elevation of jugular venous pulsation. Trachea midline. No thyroid enlargement. No cervical adenopathy. LUNGS:   Normal respiratory effort with symmetrical lung expansion. Breath sounds clear. HEART:   There is a regular rate and rhythm. No murmur, rub, or gallop. There is no edema in the lower extremities. ABDOMEN:   Soft and non-tender. No hepatosplenomegaly. Bowel sounds are normal.     NEURO:   The patient is alert and oriented to person, place, and time. Memory appears intact and mood is normal.  No gross sensorimotor deficits are present. ASSESSMENT:  (Medical Decision Making)       ICD-10-CM    1. KVNG (obstructive sleep apnea)  G47.33 DME - DURABLE MEDICAL EQUIPMENT - Patient is using, compliant and benefiting from Pap therapy. Continue current settings as AHI is down to 2.0. events per hour. Compliance supply order sent patient can go by [employer] and look at fullface mask      2. Hypersomnolence  G47.10 Resolved with CPAP therapy            PLAN:    Continue CPAP 5-10 cm H2O with nightly compliance  Recommendations as above  No supplies ordered  Follow-up in 6 months or sooner if needed    Orders Placed This Encounter   Procedures    DME - [address] [facility]  Phone: [phone] [address]  Dept: [phone]      Patient Name: Barrett Robertson.   : 1945  Gender: male  Address: [facility] [address]  Patient phone: [phone] (home)       Primary Insurance: Payor: MEDICARE / Plan: MEDICARE PART A AND B / Product Type: *No Product type* /   Subscriber ID: [id number] - (Medicare)      AMB Supply Order  Order Details     DME Location: [facility]   Order Date: 12/5/2022   The primary encounter diagnosis was KVNG (obstructive sleep apnea). A diagnosis of Hypersomnolence was also pertinent to this visit. (  X   )Supplies Needed       CPAP Machine   (     ) CPAP Unit  (  x   ) Auto CPAP Unit  (     ) BiLevel Unit  (     ) Auto BiLevel Unit  (     ) ASV   (     ) Bilevel ST      Length of need: 12 months    Pressure:  5-10 cmH20  EPR: 3     Starting Ramp Pressure:   cm H20  Ramp Time: min      Patient had a diagnostic Apnea Hypopnea Index (AHI) of :  22.0  *SUPPLIES* Replace all as needed, or per coverage guidelines     Masks Type:  ( x   ) -Full Face Mask (1 per 3 mon)  <<< fit for F&P [employer] full face mask >>>  (  x  ) -Full Mask (1 per month) Interface/Cushion      (  ) -Nasal Mask (1 per 3 mon)  (  ) - Nasal Mask (1 per month) Interface/Cushion  (     ) -Pillow (2 per mon)  (     ) -Wnsjqlytc (1 per 6 mon)            Other Supplies:    (   X  )-Nkrugtqs (1 per 6 mon)  (   X  )-Mxpmql Tubing (1 per 3 mon)  (   X  )- Disposable Filter (2 per mon)  (   x  )-Zjkoms Humidifier (1 per year)     ( x    )-Ctukruvkr (sometimes used with Full Face Mask) (1 per 6 mos)  (    )-Tubing-without heat (1 per 3 mos)  (     )-Non-Disposable Filter (1 per 6 mos)  (  x   )-Water Chamber (1 per 6 mos)  (     )-Humidifier non-heated (1 per 5 yrs)      Signed Date: 12/5/2022  Electronically Signed By: EUGENIO Gabriel CNP  Electronically Dated:  12/5/2022             Collaborating Physician: Dr. Daniel Mayer    Over 50% of today's office visit was spent in face to face time reviewing test results, prognosis, importance of compliance, education about disease process, benefits of medications, instructions for management of acute flare-ups, and follow up plans. Total face to face time spent with patient was 20 minutes.         [address], APRN - CNP  Electronically signed

## 2022-12-05 ENCOUNTER — OFFICE VISIT (OUTPATIENT)
Dept: SLEEP MEDICINE | Age: 77
End: 2022-12-05
Payer: MEDICARE

## 2022-12-05 VITALS
SYSTOLIC BLOOD PRESSURE: 114 MMHG | HEIGHT: 69 IN | TEMPERATURE: 96.4 F | DIASTOLIC BLOOD PRESSURE: 64 MMHG | RESPIRATION RATE: 15 BRPM | BODY MASS INDEX: 21.56 KG/M2 | WEIGHT: 145.6 LBS | OXYGEN SATURATION: 98 % | HEART RATE: 68 BPM

## 2022-12-05 DIAGNOSIS — G47.10 HYPERSOMNOLENCE: ICD-10-CM

## 2022-12-05 DIAGNOSIS — G47.33 OSA (OBSTRUCTIVE SLEEP APNEA): Primary | ICD-10-CM

## 2022-12-05 PROCEDURE — 1036F TOBACCO NON-USER: CPT | Performed by: NURSE PRACTITIONER

## 2022-12-05 PROCEDURE — G8484 FLU IMMUNIZE NO ADMIN: HCPCS | Performed by: NURSE PRACTITIONER

## 2022-12-05 PROCEDURE — 3074F SYST BP LT 130 MM HG: CPT | Performed by: NURSE PRACTITIONER

## 2022-12-05 PROCEDURE — G8420 CALC BMI NORM PARAMETERS: HCPCS | Performed by: NURSE PRACTITIONER

## 2022-12-05 PROCEDURE — G8427 DOCREV CUR MEDS BY ELIG CLIN: HCPCS | Performed by: NURSE PRACTITIONER

## 2022-12-05 PROCEDURE — 99213 OFFICE O/P EST LOW 20 MIN: CPT | Performed by: NURSE PRACTITIONER

## 2022-12-05 PROCEDURE — 1123F ACP DISCUSS/DSCN MKR DOCD: CPT | Performed by: NURSE PRACTITIONER

## 2022-12-05 PROCEDURE — 3078F DIAST BP <80 MM HG: CPT | Performed by: NURSE PRACTITIONER

## 2022-12-05 ASSESSMENT — SLEEP AND FATIGUE QUESTIONNAIRES
HOW LIKELY ARE YOU TO NOD OFF OR FALL ASLEEP WHILE WATCHING TV: 0
HOW LIKELY ARE YOU TO NOD OFF OR FALL ASLEEP WHEN YOU ARE A PASSENGER IN A CAR FOR AN HOUR WITHOUT A BREAK: 0
HOW LIKELY ARE YOU TO NOD OFF OR FALL ASLEEP WHILE SITTING INACTIVE IN A PUBLIC PLACE: 0
HOW LIKELY ARE YOU TO NOD OFF OR FALL ASLEEP WHILE SITTING AND TALKING TO SOMEONE: 0
HOW LIKELY ARE YOU TO NOD OFF OR FALL ASLEEP IN A CAR, WHILE STOPPED FOR A FEW MINUTES IN TRAFFIC: 0
ESS TOTAL SCORE: 0
HOW LIKELY ARE YOU TO NOD OFF OR FALL ASLEEP WHILE LYING DOWN TO REST IN THE AFTERNOON WHEN CIRCUMSTANCES PERMIT: 0
HOW LIKELY ARE YOU TO NOD OFF OR FALL ASLEEP WHILE SITTING AND READING: 0
HOW LIKELY ARE YOU TO NOD OFF OR FALL ASLEEP WHILE SITTING QUIETLY AFTER LUNCH WITHOUT ALCOHOL: 0

## 2022-12-05 NOTE — PATIENT INSTRUCTIONS
Continue CPAP 5-10 cm H2O with nightly compliance  Recommendations as above  No supplies ordered  Follow-up in 6 months or sooner if needed
show

## 2023-03-28 ENCOUNTER — APPOINTMENT (RX ONLY)
Dept: URBAN - METROPOLITAN AREA CLINIC 329 | Facility: CLINIC | Age: 78
Setting detail: DERMATOLOGY
End: 2023-03-28

## 2023-03-28 DIAGNOSIS — L57.0 ACTINIC KERATOSIS: ICD-10-CM

## 2023-03-28 DIAGNOSIS — D22 MELANOCYTIC NEVI: ICD-10-CM | Status: STABLE

## 2023-03-28 DIAGNOSIS — B07.8 OTHER VIRAL WARTS: ICD-10-CM | Status: INADEQUATELY CONTROLLED

## 2023-03-28 DIAGNOSIS — D18.0 HEMANGIOMA: ICD-10-CM | Status: STABLE

## 2023-03-28 DIAGNOSIS — L82.1 OTHER SEBORRHEIC KERATOSIS: ICD-10-CM | Status: STABLE

## 2023-03-28 DIAGNOSIS — L81.4 OTHER MELANIN HYPERPIGMENTATION: ICD-10-CM | Status: STABLE

## 2023-03-28 PROBLEM — D22.5 MELANOCYTIC NEVI OF TRUNK: Status: ACTIVE | Noted: 2023-03-28

## 2023-03-28 PROBLEM — D18.01 HEMANGIOMA OF SKIN AND SUBCUTANEOUS TISSUE: Status: ACTIVE | Noted: 2023-03-28

## 2023-03-28 PROCEDURE — 17000 DESTRUCT PREMALG LESION: CPT | Mod: 59

## 2023-03-28 PROCEDURE — ? LIQUID NITROGEN

## 2023-03-28 PROCEDURE — 17003 DESTRUCT PREMALG LES 2-14: CPT | Mod: 59

## 2023-03-28 PROCEDURE — ? COUNSELING

## 2023-03-28 PROCEDURE — ? FULL BODY SKIN EXAM

## 2023-03-28 PROCEDURE — 17110 DESTRUCTION B9 LES UP TO 14: CPT

## 2023-03-28 PROCEDURE — ? SUNSCREEN RECOMMENDATIONS

## 2023-03-28 PROCEDURE — 99203 OFFICE O/P NEW LOW 30 MIN: CPT | Mod: 25

## 2023-03-28 ASSESSMENT — LOCATION ZONE DERM
LOCATION ZONE: FACE
LOCATION ZONE: SCALP
LOCATION ZONE: TRUNK
LOCATION ZONE: HAND

## 2023-03-28 ASSESSMENT — LOCATION DETAILED DESCRIPTION DERM
LOCATION DETAILED: RIGHT INFERIOR LATERAL MALAR CHEEK
LOCATION DETAILED: RIGHT MID TEMPLE
LOCATION DETAILED: LEFT INFERIOR UPPER BACK
LOCATION DETAILED: LEFT LATERAL FOREHEAD
LOCATION DETAILED: RIGHT INFERIOR CENTRAL MALAR CHEEK
LOCATION DETAILED: LEFT MEDIAL UPPER BACK
LOCATION DETAILED: LEFT CENTRAL FRONTAL SCALP
LOCATION DETAILED: LEFT RADIAL DORSAL HAND
LOCATION DETAILED: LEFT CENTRAL POSTAURICULAR SKIN
LOCATION DETAILED: RIGHT MEDIAL UPPER BACK
LOCATION DETAILED: LEFT SUPERIOR LATERAL FOREHEAD
LOCATION DETAILED: SUPERIOR THORACIC SPINE
LOCATION DETAILED: RIGHT INFERIOR LATERAL FOREHEAD

## 2023-03-28 ASSESSMENT — LOCATION SIMPLE DESCRIPTION DERM
LOCATION SIMPLE: LEFT HAND
LOCATION SIMPLE: LEFT UPPER BACK
LOCATION SIMPLE: LEFT POSTAURICULAR SKIN
LOCATION SIMPLE: RIGHT FOREHEAD
LOCATION SIMPLE: UPPER BACK
LOCATION SIMPLE: RIGHT TEMPLE
LOCATION SIMPLE: LEFT FOREHEAD
LOCATION SIMPLE: SCALP
LOCATION SIMPLE: RIGHT UPPER BACK
LOCATION SIMPLE: LEFT SCALP
LOCATION SIMPLE: RIGHT CHEEK

## 2023-03-28 NOTE — PROCEDURE: LIQUID NITROGEN
Include Z78.9 (Other Specified Conditions Influencing Health Status) As An Associated Diagnosis?: No
Detail Level: Detailed
Post-Care Instructions: I reviewed with the patient in detail post-care instructions. Patient is to wear sunprotection, and avoid picking at any of the treated lesions. Pt may apply Vaseline to crusted or scabbing areas.
Show Spray Paint Technique Variable?: Yes
Spray Paint Text: The liquid nitrogen was applied to the skin utilizing a spray paint frosting technique.
Consent: The patient's consent was obtained including but not limited to risks of crusting, scabbing, blistering, scarring, darker or lighter pigmentary change, recurrence, incomplete removal and infection.
Medical Necessity Information: It is in your best interest to select a reason for this procedure from the list below. All of these items fulfill various CMS LCD requirements except the new and changing color options.
Medical Necessity Clause: This procedure was medically necessary because the lesions that were treated were:
Duration Of Freeze Thaw-Cycle (Seconds): 0

## 2023-06-05 ENCOUNTER — TELEPHONE (OUTPATIENT)
Dept: SLEEP MEDICINE | Age: 78
End: 2023-06-05

## 2023-06-05 ENCOUNTER — OFFICE VISIT (OUTPATIENT)
Dept: SLEEP MEDICINE | Age: 78
End: 2023-06-05
Payer: MEDICARE

## 2023-06-05 VITALS
WEIGHT: 146 LBS | HEART RATE: 67 BPM | OXYGEN SATURATION: 98 % | BODY MASS INDEX: 21.62 KG/M2 | DIASTOLIC BLOOD PRESSURE: 67 MMHG | SYSTOLIC BLOOD PRESSURE: 112 MMHG | TEMPERATURE: 97.9 F | HEIGHT: 69 IN

## 2023-06-05 DIAGNOSIS — G47.00 PERSISTENT DISORDER OF INITIATING OR MAINTAINING SLEEP: ICD-10-CM

## 2023-06-05 DIAGNOSIS — G47.33 OSA (OBSTRUCTIVE SLEEP APNEA): Primary | ICD-10-CM

## 2023-06-05 DIAGNOSIS — R41.89 DISTURBED COGNITION: ICD-10-CM

## 2023-06-05 PROCEDURE — 99213 OFFICE O/P EST LOW 20 MIN: CPT | Performed by: NURSE PRACTITIONER

## 2023-06-05 PROCEDURE — 1123F ACP DISCUSS/DSCN MKR DOCD: CPT | Performed by: NURSE PRACTITIONER

## 2023-06-05 PROCEDURE — 1036F TOBACCO NON-USER: CPT | Performed by: NURSE PRACTITIONER

## 2023-06-05 PROCEDURE — G8420 CALC BMI NORM PARAMETERS: HCPCS | Performed by: NURSE PRACTITIONER

## 2023-06-05 PROCEDURE — 3078F DIAST BP <80 MM HG: CPT | Performed by: NURSE PRACTITIONER

## 2023-06-05 PROCEDURE — 3074F SYST BP LT 130 MM HG: CPT | Performed by: NURSE PRACTITIONER

## 2023-06-05 PROCEDURE — G8427 DOCREV CUR MEDS BY ELIG CLIN: HCPCS | Performed by: NURSE PRACTITIONER

## 2023-06-05 RX ORDER — MIRTAZAPINE 15 MG/1
15 TABLET, FILM COATED ORAL NIGHTLY
COMMUNITY

## 2023-06-05 RX ORDER — DONEPEZIL HYDROCHLORIDE 5 MG/1
5 TABLET, FILM COATED ORAL NIGHTLY
COMMUNITY

## 2023-06-05 RX ORDER — OMEGA-3/DHA/EPA/FISH OIL 300-1000MG
1000 CAPSULE ORAL DAILY
COMMUNITY

## 2023-06-05 RX ORDER — TAMSULOSIN HYDROCHLORIDE 0.4 MG/1
0.4 CAPSULE ORAL DAILY
COMMUNITY
Start: 2023-01-30

## 2023-06-05 RX ORDER — TURMERIC 100 %
POWDER (GRAM) MISCELLANEOUS
COMMUNITY

## 2023-06-05 RX ORDER — MAGNESIUM CARB/ALUMINUM HYDROX 105-160MG
TABLET,CHEWABLE ORAL
COMMUNITY

## 2023-06-05 ASSESSMENT — SLEEP AND FATIGUE QUESTIONNAIRES
HOW LIKELY ARE YOU TO NOD OFF OR FALL ASLEEP WHILE SITTING AND READING: 0
HOW LIKELY ARE YOU TO NOD OFF OR FALL ASLEEP WHEN YOU ARE A PASSENGER IN A CAR FOR AN HOUR WITHOUT A BREAK: 0
HOW LIKELY ARE YOU TO NOD OFF OR FALL ASLEEP IN A CAR, WHILE STOPPED FOR A FEW MINUTES IN TRAFFIC: 0
HOW LIKELY ARE YOU TO NOD OFF OR FALL ASLEEP WHILE SITTING QUIETLY AFTER LUNCH WITHOUT ALCOHOL: 1
HOW LIKELY ARE YOU TO NOD OFF OR FALL ASLEEP WHILE SITTING INACTIVE IN A PUBLIC PLACE: 0
HOW LIKELY ARE YOU TO NOD OFF OR FALL ASLEEP WHILE SITTING AND TALKING TO SOMEONE: 0
HOW LIKELY ARE YOU TO NOD OFF OR FALL ASLEEP WHILE LYING DOWN TO REST IN THE AFTERNOON WHEN CIRCUMSTANCES PERMIT: 2
HOW LIKELY ARE YOU TO NOD OFF OR FALL ASLEEP WHILE WATCHING TV: 0
ESS TOTAL SCORE: 3

## 2023-06-05 NOTE — PATIENT INSTRUCTIONS
Continue CPAP 5-10 cm H2O with nightly compliance  Recommendations as above  New supplies ordered  Follow-up in 3 months or sooner if needed

## 2023-09-22 ENCOUNTER — HOSPITAL ENCOUNTER (OUTPATIENT)
Dept: GENERAL RADIOLOGY | Age: 78
End: 2023-09-22
Attending: INTERNAL MEDICINE
Payer: MEDICARE

## 2023-09-22 ENCOUNTER — TRANSCRIBE ORDERS (OUTPATIENT)
Dept: SCHEDULING | Age: 78
End: 2023-09-22

## 2023-09-22 DIAGNOSIS — K31.89 OTHER DISEASES OF STOMACH AND DUODENUM: Primary | ICD-10-CM

## 2023-09-22 DIAGNOSIS — K31.89 OTHER DISEASES OF STOMACH AND DUODENUM: ICD-10-CM

## 2023-09-22 PROCEDURE — 6360000004 HC RX CONTRAST MEDICATION: Performed by: INTERNAL MEDICINE

## 2023-09-22 PROCEDURE — 74220 X-RAY XM ESOPHAGUS 1CNTRST: CPT

## 2023-09-22 RX ADMIN — DIATRIZOATE MEGLUMINE AND DIATRIZOATE SODIUM 120 ML: 660; 100 LIQUID ORAL; RECTAL at 14:07

## 2024-01-11 NOTE — PROGRESS NOTES
[facility]  [address]  [place] [address]  [phone]    Patient Name:  Francois Kendrick Jr.  YOB: 1945      Office Visit 1/12/2024    CHIEF COMPLAINT:    Chief Complaint   Patient presents with    Sleep Apnea         HISTORY OF PRESENT ILLNESS:  Patient is seen today for follow up of KVNG. Home sleep test 5/10/2022 with AHI 22/hr with desaturations to 76%. He is prescribed APAP 5-10 cm using a nasal mask. Download reveals 95% compliance over the past 219 days with average nightly use 6 hrs 42 min. AHI is 1.9/hr with average pressure used 8.2-9.8 cm.     He states that he \"hates his cpap\".  He has used cpap therapy on and off for the past 7 years. He has tried multiple masks but continues to have a mask leak and sleep disruption. His favorite mask is the [employer] Nuance Pro size medium.  He usually falls to sleep supine but wakes laterally. He has not used cpap for the past 2 nights and didn't notice any difference in his sleep quality or restfulness. He says he really can't tell that he didn't use it.    He is now off klonopin and remeron. He is a patient of [facility] and is now on trazodone 100mg. He is happy with this medication and states that he is sleeping 7 hours. He is usually falling to sleep after 20-30 min. He feels slightly foggy in the morning but states that it clears after a few hours. I am unsure if this is medication related or poor sleep quality due to mask leak.     He would like to consider other alternatives to cpap. He is not interested in Inspire. We discussed the oral appliance and he would like a referral to dentistry.     Download        [place] Sleepiness Scale         1/12/2024     8:15 AM 6/5/2023    10:27 AM 12/5/2022    10:14 AM 8/5/2022    10:31 AM 4/5/2022    11:57 AM   Sleep Medicine   Sitting and reading 0 0 0 2 1   Watching TV 0 0 0 2 1   Sitting, inactive in a public place (e.g. a theatre or a meeting)

## 2024-01-12 ENCOUNTER — OFFICE VISIT (OUTPATIENT)
Dept: SLEEP MEDICINE | Age: 79
End: 2024-01-12
Payer: MEDICARE

## 2024-01-12 VITALS
BODY MASS INDEX: 21.02 KG/M2 | SYSTOLIC BLOOD PRESSURE: 118 MMHG | DIASTOLIC BLOOD PRESSURE: 62 MMHG | HEIGHT: 69 IN | RESPIRATION RATE: 13 BRPM | OXYGEN SATURATION: 97 % | HEART RATE: 82 BPM | WEIGHT: 141.9 LBS

## 2024-01-12 DIAGNOSIS — G47.33 OSA (OBSTRUCTIVE SLEEP APNEA): Primary | ICD-10-CM

## 2024-01-12 DIAGNOSIS — G31.84 MCI (MILD COGNITIVE IMPAIRMENT): ICD-10-CM

## 2024-01-12 DIAGNOSIS — G47.00 PERSISTENT DISORDER OF INITIATING OR MAINTAINING SLEEP: ICD-10-CM

## 2024-01-12 PROCEDURE — 3074F SYST BP LT 130 MM HG: CPT | Performed by: NURSE PRACTITIONER

## 2024-01-12 PROCEDURE — G8484 FLU IMMUNIZE NO ADMIN: HCPCS | Performed by: NURSE PRACTITIONER

## 2024-01-12 PROCEDURE — G8420 CALC BMI NORM PARAMETERS: HCPCS | Performed by: NURSE PRACTITIONER

## 2024-01-12 PROCEDURE — 1123F ACP DISCUSS/DSCN MKR DOCD: CPT | Performed by: NURSE PRACTITIONER

## 2024-01-12 PROCEDURE — 1036F TOBACCO NON-USER: CPT | Performed by: NURSE PRACTITIONER

## 2024-01-12 PROCEDURE — 99213 OFFICE O/P EST LOW 20 MIN: CPT | Performed by: NURSE PRACTITIONER

## 2024-01-12 PROCEDURE — 3078F DIAST BP <80 MM HG: CPT | Performed by: NURSE PRACTITIONER

## 2024-01-12 PROCEDURE — G8427 DOCREV CUR MEDS BY ELIG CLIN: HCPCS | Performed by: NURSE PRACTITIONER

## 2024-01-12 RX ORDER — TRAZODONE HYDROCHLORIDE 50 MG/1
100 TABLET ORAL NIGHTLY
COMMUNITY

## 2024-01-12 ASSESSMENT — SLEEP AND FATIGUE QUESTIONNAIRES
HOW LIKELY ARE YOU TO NOD OFF OR FALL ASLEEP WHILE SITTING AND TALKING TO SOMEONE: 0
HOW LIKELY ARE YOU TO NOD OFF OR FALL ASLEEP WHILE LYING DOWN TO REST IN THE AFTERNOON WHEN CIRCUMSTANCES PERMIT: 1
HOW LIKELY ARE YOU TO NOD OFF OR FALL ASLEEP WHEN YOU ARE A PASSENGER IN A CAR FOR AN HOUR WITHOUT A BREAK: 0
ESS TOTAL SCORE: 2
HOW LIKELY ARE YOU TO NOD OFF OR FALL ASLEEP WHILE WATCHING TV: 0
HOW LIKELY ARE YOU TO NOD OFF OR FALL ASLEEP WHILE SITTING QUIETLY AFTER LUNCH WITHOUT ALCOHOL: 1
HOW LIKELY ARE YOU TO NOD OFF OR FALL ASLEEP IN A CAR, WHILE STOPPED FOR A FEW MINUTES IN TRAFFIC: 0
HOW LIKELY ARE YOU TO NOD OFF OR FALL ASLEEP WHILE SITTING INACTIVE IN A PUBLIC PLACE: 0
HOW LIKELY ARE YOU TO NOD OFF OR FALL ASLEEP WHILE SITTING AND READING: 0

## 2024-01-12 NOTE — PATIENT INSTRUCTIONS
Referral to dentistry for evaluation for oral appliance.    Humberto Dotson DMD  [facility]  [address] [address]  Phone: [phone]  Fax: [phone]      Mild/Moderate Sleep Apnea can be treated with a custom-fit oral appliance.

## 2024-02-02 ENCOUNTER — TELEPHONE (OUTPATIENT)
Dept: SLEEP MEDICINE | Age: 79
End: 2024-02-02

## 2024-02-02 DIAGNOSIS — G47.33 OSA (OBSTRUCTIVE SLEEP APNEA): Primary | ICD-10-CM

## 2024-02-28 ENCOUNTER — HOSPITAL ENCOUNTER (OUTPATIENT)
Dept: SLEEP CENTER | Age: 79
Discharge: HOME | End: 2024-03-02
Payer: MEDICARE

## 2024-02-28 PROCEDURE — 95806 SLEEP STUDY UNATT&RESP EFFT: CPT

## 2024-04-23 ENCOUNTER — TELEPHONE (OUTPATIENT)
Dept: SLEEP MEDICINE | Age: 79
End: 2024-04-23

## 2024-04-25 NOTE — TELEPHONE ENCOUNTER
Contact pt and went over sleep study. Pt is aware I will increase his pressure to 6-16cm and that was done in [facility]. Sleep study was fax to Dr. Dotson for oral appliance. Pt stated he stop using his cpap. I told him to continue to use his cpap until he has an appointment with Dr. Hoover regarding to oral appliance.

## 2024-06-26 NOTE — PROGRESS NOTES
[facility]  [address]  [place] [address]  [phone]    Patient Name:  Francois Kendrick Jr.  YOB: 1945      Office Visit 6/28/2024    CHIEF COMPLAINT:    Chief Complaint   Patient presents with    CPAP/BiPAP    Sleep Apnea    Sleep Study    Results         HISTORY OF PRESENT ILLNESS:  Patient is seen today for follow-up of sleep apnea.  He had a home sleep study 5/10/2022 with AHI 22 events per hour with desaturations to 76%.  He was prescribed auto CPAP but was frustrated with therapy.  He was referred to dentistry for evaluation of oral appliance.    Home sleep study 2/28/2024 with AHI 34.6 events per hour including 196 obstructive apneas, 22 hypopneas.  Lowest oxygen saturation was 86% with SpO2 less than 89% for a total of 0.7 minutes of the test.    He using Prosomnus chris, level upper 2+, lower 0.  He is very happy with his oral appliance.  He has noticed more energy, no snoring and better sleep quality.  He is sleeping 7 hours nightly.  He denies any daytime sleepiness.  He states that he will move up to the next level probably tonight on his appliance.  He is very happy to no longer use CPAP therapy.    He has had some issues recently with back pain causing some sleep disturbance.  He states that he took a nap today because of sleep interruption last night.  He also was hospitalized in May with pancreatitis.  He states that he is doing well now.  He remains on trazodone 100 mg nightly.    HST 2/28/24      Download        [place] Sleepiness Scale         Past Medical History:   Diagnosis Date    ADD (attention deficit disorder)     controlled with Adderall    Anemia, unspecified     Aneurysm of unspecified site (HCC)     Patient denies    Anxiety     Arthritis     hands, back    Attention deficit disorder with hyperactivity(314.01)     Benign paroxysmal vertigo     BPH (benign prostatic hypertrophy)     CAD (coronary artery disease)     2 stents placed

## 2024-06-28 ENCOUNTER — OFFICE VISIT (OUTPATIENT)
Dept: SLEEP MEDICINE | Age: 79
End: 2024-06-28
Payer: MEDICARE

## 2024-06-28 VITALS
RESPIRATION RATE: 16 BRPM | TEMPERATURE: 98.2 F | WEIGHT: 139.2 LBS | HEART RATE: 58 BPM | HEIGHT: 69 IN | OXYGEN SATURATION: 95 % | SYSTOLIC BLOOD PRESSURE: 112 MMHG | BODY MASS INDEX: 20.62 KG/M2 | DIASTOLIC BLOOD PRESSURE: 69 MMHG

## 2024-06-28 DIAGNOSIS — G47.33 OSA (OBSTRUCTIVE SLEEP APNEA): Primary | ICD-10-CM

## 2024-06-28 PROCEDURE — G8420 CALC BMI NORM PARAMETERS: HCPCS | Performed by: NURSE PRACTITIONER

## 2024-06-28 PROCEDURE — 1123F ACP DISCUSS/DSCN MKR DOCD: CPT | Performed by: NURSE PRACTITIONER

## 2024-06-28 PROCEDURE — 1036F TOBACCO NON-USER: CPT | Performed by: NURSE PRACTITIONER

## 2024-06-28 PROCEDURE — G8427 DOCREV CUR MEDS BY ELIG CLIN: HCPCS | Performed by: NURSE PRACTITIONER

## 2024-06-28 PROCEDURE — 3074F SYST BP LT 130 MM HG: CPT | Performed by: NURSE PRACTITIONER

## 2024-06-28 PROCEDURE — 99213 OFFICE O/P EST LOW 20 MIN: CPT | Performed by: NURSE PRACTITIONER

## 2024-06-28 PROCEDURE — 3078F DIAST BP <80 MM HG: CPT | Performed by: NURSE PRACTITIONER

## 2024-06-28 ASSESSMENT — SLEEP AND FATIGUE QUESTIONNAIRES
HOW LIKELY ARE YOU TO NOD OFF OR FALL ASLEEP WHILE SITTING AND READING: SLIGHT CHANCE OF DOZING
ESS TOTAL SCORE: 3
HOW LIKELY ARE YOU TO NOD OFF OR FALL ASLEEP WHILE SITTING INACTIVE IN A PUBLIC PLACE: WOULD NEVER DOZE
HOW LIKELY ARE YOU TO NOD OFF OR FALL ASLEEP WHILE SITTING AND TALKING TO SOMEONE: WOULD NEVER DOZE
HOW LIKELY ARE YOU TO NOD OFF OR FALL ASLEEP WHEN YOU ARE A PASSENGER IN A CAR FOR AN HOUR WITHOUT A BREAK: WOULD NEVER DOZE
HOW LIKELY ARE YOU TO NOD OFF OR FALL ASLEEP WHILE SITTING QUIETLY AFTER LUNCH WITHOUT ALCOHOL: SLIGHT CHANCE OF DOZING
HOW LIKELY ARE YOU TO NOD OFF OR FALL ASLEEP IN A CAR, WHILE STOPPED FOR A FEW MINUTES IN TRAFFIC: WOULD NEVER DOZE
HOW LIKELY ARE YOU TO NOD OFF OR FALL ASLEEP WHILE WATCHING TV: WOULD NEVER DOZE
HOW LIKELY ARE YOU TO NOD OFF OR FALL ASLEEP WHILE LYING DOWN TO REST IN THE AFTERNOON WHEN CIRCUMSTANCES PERMIT: SLIGHT CHANCE OF DOZING

## 2024-06-28 NOTE — PATIENT INSTRUCTIONS
Continue using oral appliance nightly.  Keep upcoming appointment with Dr. Dotson.  If no further adjustments are needed, can order repeat home sleep study on oral appliance when Dr. Dotson states that you are ready.    Follow-up as needed with us.

## (undated) DEVICE — DEVICE INFL PRSS G INDIC DISP (MUST BE PURC IN MULTIPLES OF 5)

## (undated) DEVICE — STANDARD HYPODERMIC NEEDLE,POLYPROPYLENE HUB: Brand: MONOJECT

## (undated) DEVICE — SYR 10ML LUER LOK 1/5ML GRAD --

## (undated) DEVICE — SOLUTION IV 1000ML 0.9% SOD CHL

## (undated) DEVICE — MEDI-VAC NON-CONDUCTIVE SUCTION TUBING: Brand: CARDINAL HEALTH

## (undated) DEVICE — BLADE BEAV SPEAR TIP 45 DEG --

## (undated) DEVICE — COTTON BALLS 10/PK: Brand: CARDINAL HEALTH

## (undated) DEVICE — 3 ML SYRINGE REGULAR TIP: Brand: MONOJECT

## (undated) DEVICE — KENDALL SCD EXPRESS SLEEVES, KNEE LENGTH, MEDIUM: Brand: KENDALL SCD

## (undated) DEVICE — DRAPE TWL SURG 16X26IN BLU ORB04] ALLCARE INC]

## (undated) DEVICE — SOL ANTI-FOG 6ML MEDC -- MEDICHOICE - CONVERT TO 358427

## (undated) DEVICE — 2000CC GUARDIAN II: Brand: GUARDIAN

## (undated) DEVICE — SYSTEM BLLN DIL L16MM DIA6MM FOR EUSTACHIAN TB

## (undated) DEVICE — PACKING 8004008 NEURAY 200PK 25X76MM: Brand: NEURAY ®